# Patient Record
Sex: FEMALE | Race: WHITE | NOT HISPANIC OR LATINO | Employment: UNEMPLOYED
[De-identification: names, ages, dates, MRNs, and addresses within clinical notes are randomized per-mention and may not be internally consistent; named-entity substitution may affect disease eponyms.]

---

## 2022-10-03 ENCOUNTER — HEALTH MAINTENANCE LETTER (OUTPATIENT)
Age: 62
End: 2022-10-03

## 2022-10-27 ENCOUNTER — OFFICE VISIT (OUTPATIENT)
Dept: FAMILY MEDICINE | Facility: CLINIC | Age: 62
End: 2022-10-27
Payer: COMMERCIAL

## 2022-10-27 VITALS
HEIGHT: 61 IN | SYSTOLIC BLOOD PRESSURE: 150 MMHG | DIASTOLIC BLOOD PRESSURE: 74 MMHG | TEMPERATURE: 97.5 F | WEIGHT: 238.6 LBS | BODY MASS INDEX: 45.05 KG/M2 | OXYGEN SATURATION: 98 % | HEART RATE: 68 BPM | RESPIRATION RATE: 18 BRPM

## 2022-10-27 DIAGNOSIS — F33.0 MILD EPISODE OF RECURRENT MAJOR DEPRESSIVE DISORDER (H): ICD-10-CM

## 2022-10-27 DIAGNOSIS — K21.00 GASTROESOPHAGEAL REFLUX DISEASE WITH ESOPHAGITIS WITHOUT HEMORRHAGE: ICD-10-CM

## 2022-10-27 DIAGNOSIS — Z23 NEEDS FLU SHOT: ICD-10-CM

## 2022-10-27 DIAGNOSIS — N95.1 SYMPTOMATIC MENOPAUSAL OR FEMALE CLIMACTERIC STATES: ICD-10-CM

## 2022-10-27 DIAGNOSIS — E11.65 TYPE 2 DIABETES MELLITUS WITH HYPERGLYCEMIA, WITH LONG-TERM CURRENT USE OF INSULIN (H): ICD-10-CM

## 2022-10-27 DIAGNOSIS — M05.79 RHEUMATOID ARTHRITIS INVOLVING MULTIPLE SITES WITH POSITIVE RHEUMATOID FACTOR (H): ICD-10-CM

## 2022-10-27 DIAGNOSIS — Z12.11 SCREEN FOR COLON CANCER: ICD-10-CM

## 2022-10-27 DIAGNOSIS — Z79.4 TYPE 2 DIABETES MELLITUS WITH HYPERGLYCEMIA, WITH LONG-TERM CURRENT USE OF INSULIN (H): ICD-10-CM

## 2022-10-27 DIAGNOSIS — J45.40 MODERATE PERSISTENT ASTHMA WITHOUT COMPLICATION: ICD-10-CM

## 2022-10-27 DIAGNOSIS — E78.5 HYPERLIPIDEMIA LDL GOAL <100: ICD-10-CM

## 2022-10-27 DIAGNOSIS — E66.01 MORBID OBESITY (H): ICD-10-CM

## 2022-10-27 DIAGNOSIS — I50.9 CONGESTIVE HEART FAILURE, UNSPECIFIED HF CHRONICITY, UNSPECIFIED HEART FAILURE TYPE (H): ICD-10-CM

## 2022-10-27 DIAGNOSIS — Z76.89 ENCOUNTER TO ESTABLISH CARE: Primary | ICD-10-CM

## 2022-10-27 DIAGNOSIS — Z12.4 CERVICAL CANCER SCREENING: ICD-10-CM

## 2022-10-27 DIAGNOSIS — Z13.220 SCREENING FOR HYPERLIPIDEMIA: ICD-10-CM

## 2022-10-27 PROBLEM — Z85.828 HISTORY OF SQUAMOUS CELL CARCINOMA OF SKIN: Status: ACTIVE | Noted: 2022-10-27

## 2022-10-27 PROBLEM — F32.A DEPRESSION: Status: ACTIVE | Noted: 2022-01-13

## 2022-10-27 PROBLEM — G43.909 MIGRAINE HEADACHE: Status: ACTIVE | Noted: 2022-01-12

## 2022-10-27 PROBLEM — G62.9 NEUROPATHY, PERIPHERAL: Status: ACTIVE | Noted: 2020-02-04

## 2022-10-27 PROBLEM — Z86.16 HISTORY OF SEVERE ACUTE RESPIRATORY SYNDROME CORONAVIRUS 2 (SARS-COV-2) DISEASE: Status: ACTIVE | Noted: 2021-11-19

## 2022-10-27 PROBLEM — E11.69 DISORDER OF NERVOUS SYSTEM DUE TO TYPE 2 DIABETES MELLITUS (H): Status: ACTIVE | Noted: 2017-08-28

## 2022-10-27 PROBLEM — Z85.3 HISTORY OF MALIGNANT NEOPLASM OF BREAST: Status: ACTIVE | Noted: 2022-10-27

## 2022-10-27 PROBLEM — I25.10 ATHEROSCLEROTIC HEART DISEASE OF NATIVE CORONARY ARTERY WITHOUT ANGINA PECTORIS: Status: ACTIVE | Noted: 2022-10-27

## 2022-10-27 PROBLEM — G98.8 DISORDER OF NERVOUS SYSTEM DUE TO TYPE 2 DIABETES MELLITUS (H): Status: ACTIVE | Noted: 2017-08-28

## 2022-10-27 PROBLEM — K21.9 GASTROESOPHAGEAL REFLUX DISEASE: Status: ACTIVE | Noted: 2022-01-13

## 2022-10-27 LAB
ALT SERPL W P-5'-P-CCNC: 37 U/L (ref 0–50)
ANION GAP SERPL CALCULATED.3IONS-SCNC: 4 MMOL/L (ref 3–14)
BUN SERPL-MCNC: 16 MG/DL (ref 7–30)
CALCIUM SERPL-MCNC: 8.9 MG/DL (ref 8.5–10.1)
CHLORIDE BLD-SCNC: 104 MMOL/L (ref 94–109)
CHOLEST SERPL-MCNC: 155 MG/DL
CO2 SERPL-SCNC: 32 MMOL/L (ref 20–32)
CREAT SERPL-MCNC: 0.7 MG/DL (ref 0.52–1.04)
ERYTHROCYTE [DISTWIDTH] IN BLOOD BY AUTOMATED COUNT: 14.8 % (ref 10–15)
FASTING STATUS PATIENT QL REPORTED: YES
GFR SERPL CREATININE-BSD FRML MDRD: >90 ML/MIN/1.73M2
GLUCOSE BLD-MCNC: 290 MG/DL (ref 70–99)
HCT VFR BLD AUTO: 39 % (ref 35–47)
HDLC SERPL-MCNC: 78 MG/DL
HGB BLD-MCNC: 13.1 G/DL (ref 11.7–15.7)
LDLC SERPL CALC-MCNC: 55 MG/DL
MCH RBC QN AUTO: 29.3 PG (ref 26.5–33)
MCHC RBC AUTO-ENTMCNC: 33.6 G/DL (ref 31.5–36.5)
MCV RBC AUTO: 87 FL (ref 78–100)
NONHDLC SERPL-MCNC: 77 MG/DL
PLATELET # BLD AUTO: 334 10E3/UL (ref 150–450)
POTASSIUM BLD-SCNC: 3.8 MMOL/L (ref 3.4–5.3)
RBC # BLD AUTO: 4.47 10E6/UL (ref 3.8–5.2)
SODIUM SERPL-SCNC: 140 MMOL/L (ref 133–144)
TRIGL SERPL-MCNC: 110 MG/DL
TSH SERPL DL<=0.005 MIU/L-ACNC: 1.91 MU/L (ref 0.4–4)
WBC # BLD AUTO: 12 10E3/UL (ref 4–11)

## 2022-10-27 PROCEDURE — 36415 COLL VENOUS BLD VENIPUNCTURE: CPT | Performed by: NURSE PRACTITIONER

## 2022-10-27 PROCEDURE — 90682 RIV4 VACC RECOMBINANT DNA IM: CPT | Performed by: NURSE PRACTITIONER

## 2022-10-27 PROCEDURE — 83036 HEMOGLOBIN GLYCOSYLATED A1C: CPT | Performed by: NURSE PRACTITIONER

## 2022-10-27 PROCEDURE — 80048 BASIC METABOLIC PNL TOTAL CA: CPT | Performed by: NURSE PRACTITIONER

## 2022-10-27 PROCEDURE — 99205 OFFICE O/P NEW HI 60 MIN: CPT | Mod: 25 | Performed by: NURSE PRACTITIONER

## 2022-10-27 PROCEDURE — 85027 COMPLETE CBC AUTOMATED: CPT | Performed by: NURSE PRACTITIONER

## 2022-10-27 PROCEDURE — 80061 LIPID PANEL: CPT | Performed by: NURSE PRACTITIONER

## 2022-10-27 PROCEDURE — 90471 IMMUNIZATION ADMIN: CPT | Performed by: NURSE PRACTITIONER

## 2022-10-27 PROCEDURE — 84443 ASSAY THYROID STIM HORMONE: CPT | Performed by: NURSE PRACTITIONER

## 2022-10-27 PROCEDURE — 84460 ALANINE AMINO (ALT) (SGPT): CPT | Performed by: NURSE PRACTITIONER

## 2022-10-27 RX ORDER — BUDESONIDE AND FORMOTEROL FUMARATE DIHYDRATE 80; 4.5 UG/1; UG/1
2 AEROSOL RESPIRATORY (INHALATION)
COMMUNITY
Start: 2022-02-15 | End: 2023-06-15

## 2022-10-27 RX ORDER — BUPROPION HYDROCHLORIDE 150 MG/1
150 TABLET, EXTENDED RELEASE ORAL
COMMUNITY
Start: 2022-05-18 | End: 2023-05-23

## 2022-10-27 RX ORDER — CITALOPRAM HYDROBROMIDE 20 MG/1
20 TABLET ORAL
COMMUNITY
Start: 2022-06-09 | End: 2023-06-15

## 2022-10-27 RX ORDER — MONTELUKAST SODIUM 10 MG/1
10 TABLET ORAL AT BEDTIME
COMMUNITY
Start: 2022-08-18 | End: 2023-09-14

## 2022-10-27 RX ORDER — CLOBETASOL PROPIONATE 0.5 MG/G
OINTMENT TOPICAL 2 TIMES DAILY PRN
COMMUNITY
Start: 2022-02-16

## 2022-10-27 RX ORDER — ALBUTEROL SULFATE 90 UG/1
2 AEROSOL, METERED RESPIRATORY (INHALATION)
COMMUNITY
Start: 2022-01-17 | End: 2023-06-15

## 2022-10-27 RX ORDER — VALSARTAN 80 MG/1
1 TABLET ORAL AT BEDTIME
COMMUNITY
Start: 2021-11-12 | End: 2023-05-23

## 2022-10-27 RX ORDER — PANTOPRAZOLE SODIUM 40 MG/1
40 TABLET, DELAYED RELEASE ORAL DAILY
COMMUNITY
Start: 2022-06-17 | End: 2023-06-15

## 2022-10-27 RX ORDER — INSULIN GLARGINE 100 [IU]/ML
40 INJECTION, SOLUTION SUBCUTANEOUS
COMMUNITY
Start: 2022-04-28 | End: 2023-06-15

## 2022-10-27 RX ORDER — IPRATROPIUM BROMIDE AND ALBUTEROL SULFATE 2.5; .5 MG/3ML; MG/3ML
3 SOLUTION RESPIRATORY (INHALATION)
COMMUNITY
Start: 2022-02-15 | End: 2023-06-15

## 2022-10-27 RX ORDER — PREDNISONE 10 MG/1
TABLET ORAL
COMMUNITY
Start: 2022-02-16 | End: 2023-07-20

## 2022-10-27 RX ORDER — FOLIC ACID 1 MG/1
1 TABLET ORAL
COMMUNITY
Start: 2022-08-10 | End: 2023-11-07

## 2022-10-27 RX ORDER — PRAMIPEXOLE DIHYDROCHLORIDE 0.12 MG/1
TABLET ORAL
COMMUNITY
Start: 2022-10-17 | End: 2023-06-15

## 2022-10-27 RX ORDER — METOPROLOL SUCCINATE 50 MG/1
1 TABLET, EXTENDED RELEASE ORAL AT BEDTIME
COMMUNITY
Start: 2021-08-13 | End: 2023-06-15

## 2022-10-27 RX ORDER — METHOTREXATE SODIUM 25 MG/ML
20 INJECTION, SOLUTION INTRA-ARTERIAL; INTRAMUSCULAR; INTRAVENOUS
COMMUNITY
Start: 2022-09-20 | End: 2023-06-15

## 2022-10-27 RX ORDER — GABAPENTIN 100 MG/1
CAPSULE ORAL
COMMUNITY
Start: 2022-04-26 | End: 2023-06-15

## 2022-10-27 RX ORDER — ATORVASTATIN CALCIUM 40 MG/1
40 TABLET, FILM COATED ORAL DAILY
COMMUNITY
Start: 2022-01-21 | End: 2023-06-15

## 2022-10-27 RX ORDER — BLOOD SUGAR DIAGNOSTIC
STRIP MISCELLANEOUS
COMMUNITY
Start: 2022-02-04 | End: 2024-03-05

## 2022-10-27 RX ORDER — FUROSEMIDE 20 MG
20 TABLET ORAL
COMMUNITY
Start: 2022-09-19 | End: 2023-06-15

## 2022-10-27 RX ORDER — DICYCLOMINE HYDROCHLORIDE 10 MG/1
CAPSULE ORAL
COMMUNITY
Start: 2021-05-11

## 2022-10-27 RX ORDER — IBUPROFEN 200 MG
200 CAPSULE ORAL
COMMUNITY
Start: 2022-09-20 | End: 2023-07-20

## 2022-10-27 RX ORDER — SODIUM CHLORIDE FOR INHALATION 3 %
4 VIAL, NEBULIZER (ML) INHALATION
COMMUNITY
Start: 2022-02-15

## 2022-10-27 ASSESSMENT — PAIN SCALES - GENERAL: PAINLEVEL: SEVERE PAIN (6)

## 2022-10-27 NOTE — LETTER
My Heart Failure Action Plan   Name: Rocio RODRIGUEZ Mensing    YOB: 1960   Date: 10/27/2022    My doctor: Naty Redmond [address] [address] [phone]  My Diagnosis: Combined Heart Failure   My Ejection Fraction: No results found for: LVEF   %   My Exercise Goal: 30 minutes daily     My Weight Plan:   Wt Readings from Last 2 Encounters:   10/27/22 108.2 kg (238 lb 9.6 oz)     Weigh yourself daily using the same scale. If you gain more than 2 pounds in 24 hours or 5 pounds in a week call the clinic    My Diet Goal: No added salt    Emergency Room Visits:    Our goal is to improve your quality of life and help you avoid a visit to the emergency room or hospital.  If we work together, we can achieve this goal. But, if you feel you need to call 911 or go to the emergency room, please do so.  If you go to the emergency room, please bring your list of medicines and your daily weight chart with you.       GREEN ZONE     Doing well today    Weight gained is no more than 2 pounds a day or 5 pounds a week.    No swelling in feet, ankles, legs or stomach.    No more swelling than usual.    No more trouble breathing than usual.    No change in my sleep.    No other problems. Actions:    I am doing fine.  I will take my medicine, follow my diet, see my doctor, exercise, and watch for symptoms.           YELLOW ZONE         Having a bad day or flare up    Weight gain of more than 2 pounds in one day or 5 pounds in one week.    New swelling in ankle, leg, knee or thigh.    Bloating in belly, pants feel tighter.    Swelling in hands or face.    Coughing or trouble breathing while walking or talking.    Harder to breathe last night.    Have trouble sleeping, wake up short of breath.    Much more tired than usual.    Not eating.    Pain in my chest or bad leg cramps.    Feel weak or dizzy. Actions:    I need to take action and call my doctor or  nurse today.                 RED ZONE         Need medical care now    Weight gain of 5 pounds overnight.    Chest pain or pressure that does not go away.    Feel less alert.    Wheezing or have trouble breathing when at rest.    Cannot sleep lying down.    Cannot take my water pill.    Pass out or faint. Actions:    I need to call my doctor or nurse now!    Call 911 if I have chest pain or cannot breathe.

## 2022-10-27 NOTE — PROGRESS NOTES
Assessment & Plan     Encounter to establish care  Chart review  Recent move to area to care for ailing father.  Would like to establish with our specialists.     Rheumatoid arthritis involving multiple sites with positive rheumatoid factor (H)  Followed by Rheumatology.  Wanted dexa= ordered.  On methotrexate, prednisone  - REVIEW OF HEALTH MAINTENANCE PROTOCOL ORDERS  - Adult Rheumatology  Referral; Future    Type 2 diabetes mellitus with hyperglycemia, with long-term current use of insulin (H)  Check labs.  On metformin, statin, asa, lantus, novalog, neurotin  - REVIEW OF HEALTH MAINTENANCE PROTOCOL ORDERS  - AMB Adult Diabetes Educator Referral; Future  - Lipid panel reflex to direct LDL Fasting; Future  - ALT; Future  - Basic metabolic panel  (Ca, Cl, CO2, Creat, Gluc, K, Na, BUN); Future  - CBC with platelets; Future  - TSH with free T4 reflex; Future  - Lipid panel reflex to direct LDL Fasting  - ALT  - Basic metabolic panel  (Ca, Cl, CO2, Creat, Gluc, K, Na, BUN)  - CBC with platelets  - TSH with free T4 reflex    Congestive heart failure, unspecified HF chronicity, unspecified heart failure type (H)  Stable.  Followed by Cardiology.  Last ERENDIRA 7/2022.  Referral sent.  On metoprolol, lasix, diovan  - Adult Cardiology Eval  Referral; Future    Morbid obesity (H)  Recommend dietary and lifestyle changes    Major depression:   On wellbutrin, celexa.      Moderate persistent asthma without complication  On singulair, symbicort    Symptomatic menopausal or female climacteric states  Dexa.    - DX Hip/Pelvis/Spine; Future    Restless leg on mirapex    GERD:   On PPI    Screen for colon cancer  Up to date    Cervical cancer screening  Had hysterectomy      Needs flu shot  update  - INFLUENZA QUAD, RECOMBINANT, P-FREE (RIV4) (FLUBLOK) AGE 50-64 [[id number]]    65 minutes spent on the date of the encounter doing chart review, review of outside records, review of test results, interpretation of tests,  "patient visit and documentation        BMI:   Estimated body mass index is 44.94 kg/m  as calculated from the following:    Height as of this encounter: 1.552 m (5' 1.1\").    Weight as of this encounter: 108.2 kg (238 lb 9.6 oz).   Weight management plan: Discussed healthy diet and exercise guidelines      Return in about 6 months (around 4/27/2023) for Physical Exam.    Naty Redmond NP  [facility] MATT Chan is a 62 year old, presenting for the following health issues:  Establish Care      History of Present Illness       Reason for visit:  New patient    She eats 0-1 servings of fruits and vegetables daily.She consumes 0 sweetened beverage(s) daily.She exercises with enough effort to increase her heart rate 9 or less minutes per day.  She exercises with enough effort to increase her heart rate 3 or less days per week.   She is taking medications regularly.       Arthritis- rheumatoid- pain and swelling  Diabetes Follow-up    How often are you checking your blood sugar? Continuous glucose monitor  What time of day are you checking your blood sugars (select all that apply)?  Not applicable  Have you had any blood sugars above 200?  No  Have you had any blood sugars below 70?  No    What symptoms do you notice when your blood sugar is low?  None    What concerns do you have today about your diabetes? None     Do you have any of these symptoms? (Select all that apply)  Numbness in feet              Hyperlipidemia Follow-Up      Are you regularly taking any medication or supplement to lower your cholesterol?   Yes- ,    Are you having muscle aches or other side effects that you think could be caused by your cholesterol lowering medication?  No    Hypertension Follow-up      Do you check your blood pressure regularly outside of the clinic? No     Are you following a low salt diet? Yes    Are your blood pressures ever more than 140 on the top number (systolic) OR more   than 90 " "on the bottom number (diastolic), for example 140/90? No    BP Readings from Last 2 Encounters:   10/27/22 (!) 150/74     LDL Cholesterol Calculated (mg/dL)   Date Value   10/27/2022 55       Depression Followup    How are you doing with your depression since your last visit? No change    Are you having other symptoms that might be associated with depression? No    Have you had a significant life event?  OTHER: caring for father     Are you feeling anxious or having panic attacks?   No    Do you have any concerns with your use of alcohol or other drugs? No    Social History     Tobacco Use     Smoking status: Never     Smokeless tobacco: Never   Substance Use Topics     Alcohol use: Not Currently     Drug use: Never     No flowsheet data found.  No flowsheet data found.  No flowsheet data found.    Suicide Assessment Five-step Evaluation and Treatment (SAFE-T)      Review of Systems   Constitutional, HEENT, cardiovascular, pulmonary, GI, , musculoskeletal, neuro, skin, endocrine and psych systems are negative, except as otherwise noted.      Objective    BP (!) 150/74   Pulse 68   Temp 97.5  F (36.4  C) (Tympanic)   Resp 18   Ht 1.552 m (5' 1.1\")   Wt 108.2 kg (238 lb 9.6 oz)   SpO2 98%   BMI 44.94 kg/m    Body mass index is 44.94 kg/m .  Physical Exam   GENERAL: healthy, alert and no distress  EYES: Eyes grossly normal to inspection, PERRL and conjunctivae and sclerae normal  HENT: ear canals and TM's normal, nose and mouth without ulcers or lesions  NECK: no adenopathy, no asymmetry, masses, or scars and thyroid normal to palpation  RESP: lungs clear to auscultation - no rales, rhonchi or wheezes  CV: regular rate and rhythm, normal S1 S2, no S3 or S4, no murmur, click or rub, no peripheral edema and peripheral pulses strong  ABDOMEN: soft, nontender, no hepatosplenomegaly, no masses and bowel sounds normal  MS: no gross musculoskeletal defects noted, no edema    Results for orders placed or performed in " visit on 10/27/22 (from the past 24 hour(s))   Lipid panel reflex to direct LDL Fasting   Result Value Ref Range    Cholesterol 155 <200 mg/dL    Triglycerides 110 <150 mg/dL    Direct Measure HDL 78 >=50 mg/dL    LDL Cholesterol Calculated 55 <=100 mg/dL    Non HDL Cholesterol 77 <130 mg/dL    Patient Fasting > 8hrs? Yes     Narrative    Cholesterol  Desirable:  <200 mg/dL    Triglycerides  Normal:  Less than 150 mg/dL  Borderline High:  150-199 mg/dL  High:  200-499 mg/dL  Very High:  Greater than or equal to 500 mg/dL    Direct Measure HDL  Female:  Greater than or equal to 50 mg/dL   Male:  Greater than or equal to 40 mg/dL    LDL Cholesterol  Desirable:  <100mg/dL  Above Desirable:  100-129 mg/dL   Borderline High:  130-159 mg/dL   High:  160-189 mg/dL   Very High:  >= 190 mg/dL    Non HDL Cholesterol  Desirable:  130 mg/dL  Above Desirable:  130-159 mg/dL  Borderline High:  160-189 mg/dL  High:  190-219 mg/dL  Very High:  Greater than or equal to 220 mg/dL   ALT   Result Value Ref Range    ALT 37 0 - 50 U/L   Basic metabolic panel  (Ca, Cl, CO2, Creat, Gluc, K, Na, BUN)   Result Value Ref Range    Sodium 140 133 - 144 mmol/L    Potassium 3.8 3.4 - 5.3 mmol/L    Chloride 104 94 - 109 mmol/L    Carbon Dioxide (CO2) 32 20 - 32 mmol/L    Anion Gap 4 3 - 14 mmol/L    Urea Nitrogen 16 7 - 30 mg/dL    Creatinine 0.70 0.52 - 1.04 mg/dL    Calcium 8.9 8.5 - 10.1 mg/dL    Glucose 290 (H) 70 - 99 mg/dL    GFR Estimate >90 >60 mL/min/1.73m2   CBC with platelets   Result Value Ref Range    WBC Count 12.0 (H) 4.0 - 11.0 10e3/uL    RBC Count 4.47 3.80 - 5.20 10e6/uL    Hemoglobin 13.1 11.7 - 15.7 g/dL    Hematocrit 39.0 35.0 - 47.0 %    MCV 87 78 - 100 fL    MCH 29.3 26.5 - 33.0 pg    MCHC 33.6 31.5 - 36.5 g/dL    RDW 14.8 10.0 - 15.0 %    Platelet Count 334 150 - 450 10e3/uL   TSH with free T4 reflex   Result Value Ref Range    TSH 1.91 0.40 - 4.00 mU/L

## 2022-10-28 LAB — HBA1C MFR BLD: 11.2 % (ref 0–5.6)

## 2022-11-01 ENCOUNTER — TELEPHONE (OUTPATIENT)
Dept: FAMILY MEDICINE | Facility: CLINIC | Age: 62
End: 2022-11-01

## 2022-11-01 NOTE — TELEPHONE ENCOUNTER
Diabetes Education Scheduling Outreach #1:    Call to patient to schedule. Voicemail full.    Also sent [employer] message for second attempt. Requested patient to call to schedule.    Shahrzad Grissom OnCall  Diabetes and Nutrition Scheduling

## 2022-11-18 ENCOUNTER — HOSPITAL ENCOUNTER (OUTPATIENT)
Dept: BONE DENSITY | Facility: CLINIC | Age: 62
Discharge: HOME | End: 2022-11-18
Attending: NURSE PRACTITIONER | Admitting: NURSE PRACTITIONER
Payer: COMMERCIAL

## 2022-11-18 DIAGNOSIS — N95.1 SYMPTOMATIC MENOPAUSAL OR FEMALE CLIMACTERIC STATES: ICD-10-CM

## 2022-11-18 PROCEDURE — 77080 DXA BONE DENSITY AXIAL: CPT

## 2023-01-12 DIAGNOSIS — Z79.4 TYPE 2 DIABETES MELLITUS WITH HYPERGLYCEMIA, WITH LONG-TERM CURRENT USE OF INSULIN (H): Primary | ICD-10-CM

## 2023-01-12 DIAGNOSIS — E11.65 TYPE 2 DIABETES MELLITUS WITH HYPERGLYCEMIA, WITH LONG-TERM CURRENT USE OF INSULIN (H): Primary | ICD-10-CM

## 2023-01-12 NOTE — TELEPHONE ENCOUNTER
Pending Prescriptions:                       Disp   Refills    metFORMIN (GLUCOPHAGE) 1000 MG tablet      90 tab*3        Sig: Take 1 tablet (1,000 mg) by mouth daily (with dinner)      Routing refill request to provider for review/approval because:  Medication is reported/historical    Marielle Perez RN on 1/12/2023 at 3:08 PM

## 2023-02-12 ENCOUNTER — HEALTH MAINTENANCE LETTER (OUTPATIENT)
Age: 63
End: 2023-02-12

## 2023-05-21 ENCOUNTER — HEALTH MAINTENANCE LETTER (OUTPATIENT)
Age: 63
End: 2023-05-21

## 2023-05-23 ENCOUNTER — MYC REFILL (OUTPATIENT)
Dept: FAMILY MEDICINE | Facility: CLINIC | Age: 63
End: 2023-05-23
Payer: COMMERCIAL

## 2023-05-23 DIAGNOSIS — I50.9 CONGESTIVE HEART FAILURE, UNSPECIFIED HF CHRONICITY, UNSPECIFIED HEART FAILURE TYPE (H): ICD-10-CM

## 2023-05-23 DIAGNOSIS — F33.0 MILD EPISODE OF RECURRENT MAJOR DEPRESSIVE DISORDER (H): Primary | ICD-10-CM

## 2023-05-25 RX ORDER — IBUPROFEN 200 MG
200 CAPSULE ORAL
Status: CANCELLED | OUTPATIENT
Start: 2023-05-25

## 2023-05-25 RX ORDER — VALSARTAN 80 MG/1
80 TABLET ORAL AT BEDTIME
Qty: 90 TABLET | Refills: 0 | Status: SHIPPED | OUTPATIENT
Start: 2023-05-25 | End: 2023-06-15

## 2023-05-25 RX ORDER — BUPROPION HYDROCHLORIDE 150 MG/1
150 TABLET, EXTENDED RELEASE ORAL DAILY
Qty: 90 TABLET | Refills: 3 | Status: SHIPPED | OUTPATIENT
Start: 2023-05-25 | End: 2023-06-15

## 2023-05-25 RX ORDER — DICYCLOMINE HYDROCHLORIDE 10 MG/1
CAPSULE ORAL
Status: CANCELLED | OUTPATIENT
Start: 2023-05-25

## 2023-05-25 RX ORDER — PREDNISONE 10 MG/1
TABLET ORAL
Status: CANCELLED | OUTPATIENT
Start: 2023-05-25

## 2023-05-25 NOTE — TELEPHONE ENCOUNTER
Pending Prescriptions:                       Disp   Refills    calcium citrate (CITRACAL) 950 (200 Ca) MG*                Sig: Take 1 tablet (950 mg) by mouth    buPROPion (WELLBUTRIN SR) 150 MG 12 hr tab*                Si tablet (150 mg)    dicyclomine (BENTYL) 10 MG capsule                           predniSONE (DELTASONE) 10 MG tablet                          valsartan (DIOVAN) 80 MG tablet                            Sig: Take 1 tablet (80 mg) by mouth At Bedtime      Routing refill request to provider for review/approval because:  Medication is reported/historical    Marielle Perez RN on 2023 at 10:15 AM

## 2023-06-01 ENCOUNTER — MYC REFILL (OUTPATIENT)
Dept: FAMILY MEDICINE | Facility: CLINIC | Age: 63
End: 2023-06-01

## 2023-06-01 RX ORDER — PREDNISONE 10 MG/1
TABLET ORAL
Status: CANCELLED | OUTPATIENT
Start: 2023-06-01

## 2023-06-02 NOTE — TELEPHONE ENCOUNTER
Pending Prescriptions:                       Disp   Refills    predniSONE (DELTASONE) 10 MG tablet                            Routing refill request to provider for review/approval because:  Drug not on the [facility] refill protocol     Marielle Perez RN on 6/2/2023 at 12:29 PM

## 2023-06-15 ENCOUNTER — MYC MEDICAL ADVICE (OUTPATIENT)
Dept: FAMILY MEDICINE | Facility: CLINIC | Age: 63
End: 2023-06-15

## 2023-06-15 ENCOUNTER — OFFICE VISIT (OUTPATIENT)
Dept: FAMILY MEDICINE | Facility: CLINIC | Age: 63
End: 2023-06-15
Payer: COMMERCIAL

## 2023-06-15 ENCOUNTER — PATIENT OUTREACH (OUTPATIENT)
Dept: FAMILY MEDICINE | Facility: CLINIC | Age: 63
End: 2023-06-15

## 2023-06-15 VITALS
TEMPERATURE: 98.1 F | SYSTOLIC BLOOD PRESSURE: 162 MMHG | DIASTOLIC BLOOD PRESSURE: 82 MMHG | BODY MASS INDEX: 46.61 KG/M2 | HEIGHT: 61 IN | OXYGEN SATURATION: 97 % | WEIGHT: 246.9 LBS | HEART RATE: 74 BPM

## 2023-06-15 DIAGNOSIS — I50.9 CONGESTIVE HEART FAILURE, UNSPECIFIED HF CHRONICITY, UNSPECIFIED HEART FAILURE TYPE (H): ICD-10-CM

## 2023-06-15 DIAGNOSIS — E11.65 TYPE 2 DIABETES MELLITUS WITH HYPERGLYCEMIA, WITH LONG-TERM CURRENT USE OF INSULIN (H): ICD-10-CM

## 2023-06-15 DIAGNOSIS — E66.01 MORBID OBESITY (H): ICD-10-CM

## 2023-06-15 DIAGNOSIS — G25.81 RESTLESS LEGS SYNDROME (RLS): ICD-10-CM

## 2023-06-15 DIAGNOSIS — E78.5 HYPERLIPIDEMIA LDL GOAL <100: ICD-10-CM

## 2023-06-15 DIAGNOSIS — F33.0 MILD EPISODE OF RECURRENT MAJOR DEPRESSIVE DISORDER (H): ICD-10-CM

## 2023-06-15 DIAGNOSIS — Z79.4 TYPE 2 DIABETES MELLITUS WITH HYPERGLYCEMIA, WITH LONG-TERM CURRENT USE OF INSULIN (H): ICD-10-CM

## 2023-06-15 DIAGNOSIS — Z76.89 ENCOUNTER TO ESTABLISH CARE: ICD-10-CM

## 2023-06-15 DIAGNOSIS — L84 CORN OR CALLUS: ICD-10-CM

## 2023-06-15 DIAGNOSIS — J45.40 MODERATE PERSISTENT ASTHMA WITHOUT COMPLICATION: Primary | ICD-10-CM

## 2023-06-15 DIAGNOSIS — M05.79 RHEUMATOID ARTHRITIS INVOLVING MULTIPLE SITES WITH POSITIVE RHEUMATOID FACTOR (H): ICD-10-CM

## 2023-06-15 DIAGNOSIS — K21.00 GASTROESOPHAGEAL REFLUX DISEASE WITH ESOPHAGITIS WITHOUT HEMORRHAGE: ICD-10-CM

## 2023-06-15 DIAGNOSIS — G62.9 PERIPHERAL POLYNEUROPATHY: ICD-10-CM

## 2023-06-15 LAB
ANION GAP SERPL CALCULATED.3IONS-SCNC: 11 MMOL/L (ref 7–15)
BUN SERPL-MCNC: 14.1 MG/DL (ref 8–23)
CALCIUM SERPL-MCNC: 9.7 MG/DL (ref 8.8–10.2)
CHLORIDE SERPL-SCNC: 99 MMOL/L (ref 98–107)
CREAT SERPL-MCNC: 0.74 MG/DL (ref 0.51–0.95)
CREAT UR-MCNC: 15.5 MG/DL
DEPRECATED HCO3 PLAS-SCNC: 27 MMOL/L (ref 22–29)
GFR SERPL CREATININE-BSD FRML MDRD: 90 ML/MIN/1.73M2
GLUCOSE SERPL-MCNC: 301 MG/DL (ref 70–99)
HBA1C MFR BLD: 9.9 % (ref 0–5.6)
MICROALBUMIN UR-MCNC: <12 MG/L
MICROALBUMIN/CREAT UR: NORMAL MG/G{CREAT}
POTASSIUM SERPL-SCNC: 4.3 MMOL/L (ref 3.4–5.3)
SODIUM SERPL-SCNC: 137 MMOL/L (ref 136–145)

## 2023-06-15 PROCEDURE — 83036 HEMOGLOBIN GLYCOSYLATED A1C: CPT | Performed by: PHYSICIAN ASSISTANT

## 2023-06-15 PROCEDURE — 99215 OFFICE O/P EST HI 40 MIN: CPT | Mod: 25 | Performed by: PHYSICIAN ASSISTANT

## 2023-06-15 PROCEDURE — 90471 IMMUNIZATION ADMIN: CPT | Performed by: PHYSICIAN ASSISTANT

## 2023-06-15 PROCEDURE — 82570 ASSAY OF URINE CREATININE: CPT | Performed by: PHYSICIAN ASSISTANT

## 2023-06-15 PROCEDURE — 90677 PCV20 VACCINE IM: CPT | Performed by: PHYSICIAN ASSISTANT

## 2023-06-15 PROCEDURE — 80048 BASIC METABOLIC PNL TOTAL CA: CPT | Performed by: PHYSICIAN ASSISTANT

## 2023-06-15 PROCEDURE — 36415 COLL VENOUS BLD VENIPUNCTURE: CPT | Performed by: PHYSICIAN ASSISTANT

## 2023-06-15 PROCEDURE — 82043 UR ALBUMIN QUANTITATIVE: CPT | Performed by: PHYSICIAN ASSISTANT

## 2023-06-15 RX ORDER — ALBUTEROL SULFATE 90 UG/1
2 AEROSOL, METERED RESPIRATORY (INHALATION) EVERY 6 HOURS PRN
Qty: 18 G | Refills: 0 | Status: SHIPPED | OUTPATIENT
Start: 2023-06-15 | End: 2023-10-12

## 2023-06-15 RX ORDER — METHOTREXATE SODIUM 25 MG/ML
20 INJECTION, SOLUTION INTRA-ARTERIAL; INTRAMUSCULAR; INTRAVENOUS WEEKLY
Qty: 10 ML | Refills: 3 | Status: SHIPPED | OUTPATIENT
Start: 2023-06-15 | End: 2023-07-20

## 2023-06-15 RX ORDER — GABAPENTIN 300 MG/1
300 CAPSULE ORAL 3 TIMES DAILY
Qty: 270 CAPSULE | Refills: 1 | Status: SHIPPED | OUTPATIENT
Start: 2023-06-15 | End: 2023-12-15

## 2023-06-15 RX ORDER — ATORVASTATIN CALCIUM 40 MG/1
40 TABLET, FILM COATED ORAL DAILY
Qty: 90 TABLET | Refills: 1 | Status: SHIPPED | OUTPATIENT
Start: 2023-06-15 | End: 2023-12-29

## 2023-06-15 RX ORDER — INSULIN GLARGINE 100 [IU]/ML
40 INJECTION, SOLUTION SUBCUTANEOUS AT BEDTIME
Qty: 15 ML | Refills: 3 | Status: SHIPPED | OUTPATIENT
Start: 2023-06-15 | End: 2023-11-30

## 2023-06-15 RX ORDER — METOPROLOL SUCCINATE 50 MG/1
50 TABLET, EXTENDED RELEASE ORAL AT BEDTIME
Qty: 90 TABLET | Refills: 1 | Status: SHIPPED | OUTPATIENT
Start: 2023-06-15 | End: 2023-12-06

## 2023-06-15 RX ORDER — CITALOPRAM HYDROBROMIDE 20 MG/1
20 TABLET ORAL DAILY
Qty: 90 TABLET | Refills: 1 | Status: SHIPPED | OUTPATIENT
Start: 2023-06-15 | End: 2023-12-19

## 2023-06-15 RX ORDER — BUPROPION HYDROCHLORIDE 150 MG/1
150 TABLET, EXTENDED RELEASE ORAL DAILY
Qty: 90 TABLET | Refills: 3 | Status: SHIPPED | OUTPATIENT
Start: 2023-06-15 | End: 2024-05-23

## 2023-06-15 RX ORDER — DAPAGLIFLOZIN 5 MG/1
5 TABLET, FILM COATED ORAL DAILY
Qty: 90 TABLET | Refills: 1 | Status: SHIPPED | OUTPATIENT
Start: 2023-06-15 | End: 2023-07-20 | Stop reason: DRUGHIGH

## 2023-06-15 RX ORDER — PANTOPRAZOLE SODIUM 40 MG/1
40 TABLET, DELAYED RELEASE ORAL DAILY
Qty: 90 TABLET | Refills: 1 | Status: SHIPPED | OUTPATIENT
Start: 2023-06-15 | End: 2024-03-06

## 2023-06-15 RX ORDER — FUROSEMIDE 20 MG
20 TABLET ORAL 2 TIMES DAILY
Qty: 180 TABLET | Refills: 1 | Status: SHIPPED | OUTPATIENT
Start: 2023-06-15 | End: 2023-12-06

## 2023-06-15 RX ORDER — BUDESONIDE AND FORMOTEROL FUMARATE DIHYDRATE 80; 4.5 UG/1; UG/1
2 AEROSOL RESPIRATORY (INHALATION) 2 TIMES DAILY
Qty: 30.6 G | Refills: 1 | Status: SHIPPED | OUTPATIENT
Start: 2023-06-15 | End: 2023-07-20

## 2023-06-15 RX ORDER — PRAMIPEXOLE DIHYDROCHLORIDE 0.12 MG/1
TABLET ORAL
Qty: 360 TABLET | Refills: 1 | Status: SHIPPED | OUTPATIENT
Start: 2023-06-15 | End: 2023-11-30

## 2023-06-15 RX ORDER — VALSARTAN 80 MG/1
80 TABLET ORAL AT BEDTIME
Qty: 90 TABLET | Refills: 0 | Status: SHIPPED | OUTPATIENT
Start: 2023-06-15 | End: 2023-07-20 | Stop reason: DRUGHIGH

## 2023-06-15 RX ORDER — PREDNISONE 10 MG/1
TABLET ORAL
Status: CANCELLED | OUTPATIENT
Start: 2023-06-15

## 2023-06-15 RX ORDER — IPRATROPIUM BROMIDE AND ALBUTEROL SULFATE 2.5; .5 MG/3ML; MG/3ML
3 SOLUTION RESPIRATORY (INHALATION) EVERY 6 HOURS PRN
Qty: 90 ML | Refills: 3 | Status: SHIPPED | OUTPATIENT
Start: 2023-06-15

## 2023-06-15 ASSESSMENT — ASTHMA QUESTIONNAIRES
QUESTION_3 LAST FOUR WEEKS HOW OFTEN DID YOUR ASTHMA SYMPTOMS (WHEEZING, COUGHING, SHORTNESS OF BREATH, CHEST TIGHTNESS OR PAIN) WAKE YOU UP AT NIGHT OR EARLIER THAN USUAL IN THE MORNING: NOT AT ALL
QUESTION_2 LAST FOUR WEEKS HOW OFTEN HAVE YOU HAD SHORTNESS OF BREATH: MORE THAN ONCE A DAY
ACT_TOTALSCORE: 18
ACT_TOTALSCORE: 18
QUESTION_4 LAST FOUR WEEKS HOW OFTEN HAVE YOU USED YOUR RESCUE INHALER OR NEBULIZER MEDICATION (SUCH AS ALBUTEROL): ONCE A WEEK OR LESS
QUESTION_5 LAST FOUR WEEKS HOW WOULD YOU RATE YOUR ASTHMA CONTROL: WELL CONTROLLED
QUESTION_1 LAST FOUR WEEKS HOW MUCH OF THE TIME DID YOUR ASTHMA KEEP YOU FROM GETTING AS MUCH DONE AT WORK, SCHOOL OR AT HOME: A LITTLE OF THE TIME

## 2023-06-15 ASSESSMENT — PATIENT HEALTH QUESTIONNAIRE - PHQ9
SUM OF ALL RESPONSES TO PHQ QUESTIONS 1-9: 9
10. IF YOU CHECKED OFF ANY PROBLEMS, HOW DIFFICULT HAVE THESE PROBLEMS MADE IT FOR YOU TO DO YOUR WORK, TAKE CARE OF THINGS AT HOME, OR GET ALONG WITH OTHER PEOPLE: SOMEWHAT DIFFICULT
SUM OF ALL RESPONSES TO PHQ QUESTIONS 1-9: 9

## 2023-06-15 NOTE — LETTER
Jace Chan,     Thank you for choosing [facility] for your health care needs.     [facility] is transforming primary care  At [facility], we re dedicated to constantly improve how we serve the health care needs of our patients and communities. We re currently making changes to the way we deliver care.     Changes you ll notice include:  An emphasis on building a relationship with a primary care provider  Access to a PAL (patient advocate and liaison) to help guide you with your care needs  Appointment lengths tailored to your specific needs and greater access to a care team to help you and your provider improve and maintain your health and well-being  Improved online access to your care team    Benefits of a primary care provider  If you don t have a designated primary care provider, we encourage you to get to know our care team online and find a provider you d like to see. Most of our providers have a short video on their online provider page. Visit [email or web address] to explore our providers and locations.    Benefits of having a primary care provider include:    They get to know you - your health history, family history and goals, making it easier to make a health plan together.   You get to know them - making health-related conversations and decisions easier    Primary care doctors help you when you re sick or hurt - but also focus on keeping you healthy with preventive care and screenings.    A doctor who sees you regularly is more likely to notice changes in your health.   You ll be connected to a broad care team who partners with your provider to support you.    Patient Advocate Liaison (PAL)   To help make sure you get the right care, at the right time, we include PALs, or Patient Advocate Liaisons, as part of your care team. Your PAL will be your first line of contact. They ll advocate for your needs and help you navigate our services, connecting you with care team members and community  resources to ensure your care is well coordinated. You may be introduced to a PAL in an upcoming visit.     Expanded care team access with tailored appointment lengths  Depending on your health care needs, you may have longer or shorter appointments and see additional care team providers - including Medication Therapy Management (MTM) pharmacists, diabetes educators, behavioral health clinicians, or social workers. At times, they may be included in your visit with your provider, or you may see them individually.     Online access to your health care records and care team  [employer] is our online tool that makes it easy to see your health care information and communicate with your care team.     [employer] allows you to:   View your health maintenance plan so you know when you re due for a preventive screening  Send secure messages to your care team  View your health history and visit summaries   Schedule appointments   Complete questionnaires and eCheck-in before appointments    Get care from your provider with an e-visit    View and pay your bill     Sign up at [employer]. Once you have an account, you also can download the mobile baylee.     Connecting to fast and convenient care  When you need fast, convenient care - consider one of the following options:     Video Visit: A convenient care option for visiting with your provider out of the comfort of your own home. Most of the things you come to the clinic to address with your provider can now be done virtually through a video. This includes your chronic medication follow up, questions or concerns you may have, and even your annual Medicare Wellness Visit.     Phone Visit: Another convenient option for follow up of common problems that may require a more in-depth discussion with your provider.     E-visit: When you need acute care quickly, or have a quick question about your medication, an E-visit is completed through [employer] and your provider will respond  within one business day.        Take care,     Filomena BOJORQUEZ RN   PAL (Patient Advocate Liaison)  [facility]  [phone]

## 2023-06-15 NOTE — NURSING NOTE
PAL RN met with pt, explained PAL RN role.    -Gave pt PAL RN contact information.   -Assisted pt in scheduling MTM visit in 1 month.   -PAL RN to call pt to schedule follow-up co-visit with Behzad Correa PA-C and MTM in 3 months.     MOR Elizabeth (Patient Advocate Liaison)  [facility]  [phone]
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
## 2023-06-15 NOTE — TELEPHONE ENCOUNTER
MICHAEL RN scheduled a follow-up visit with PCP and MTM for pt on 9/18/23 at 10:40 AM.     Call placed to pt to notify her of scheduled appointment-Unable to leave message as vm has not been set up yet.     SB3/5 PAL welcome letter sent     -Sent pt a [employer] message to notify her of scheduled appointment time.     -Will follow-up to ensure appointment time and date work for pt.     MOR Elizabeth (Patient Advocate Liaison)  [facility]  [phone]

## 2023-06-16 ENCOUNTER — PATIENT OUTREACH (OUTPATIENT)
Dept: FAMILY MEDICINE | Facility: CLINIC | Age: 63
End: 2023-06-16

## 2023-06-16 NOTE — TELEPHONE ENCOUNTER
PAL attempted to call pt, however no  is setup to relay message below from Behzad Correa PA-C.     -Sent pt a [employer] message to request she call insurance for alternative options.  Will await response.     MOR Elizabeth (Patient Advocate Liaison)  [facility]  [phone]

## 2023-06-16 NOTE — TELEPHONE ENCOUNTER
.Behzad Correa PA-C-    Farxiga is not affordable for pt. Cost is >$1600/month per pt. Is an alternative available?    Please review and advise.       Received a call from pt,     -Pt attempted to refill farxiga and the cost is >$1600/month.     -She attempted to use [facility] with no improvement in cost.     -She will look into a possible manufacturers coupon as advised by writer.     -She would like a more affordable option if possible     -Pt notified of co-visit appointment scheduled on 9/18 and is agreeable with appointment time and date.   Routed to PCP    Filomena BOJORQUEZ RN   PAL (Patient Advocate Liaison)  [facility]  [phone]

## 2023-06-16 NOTE — TELEPHONE ENCOUNTER
There are multiple alternatives we could try, but I do not know if they would be covered or not. I recommend she check with her insurance prior to sending to avoid delay and multiple encounters.     Jardiance would be the preferred alternative.     However, ozempic, trulicity, bydureon, are all possible as well.     -[facility]

## 2023-06-26 ENCOUNTER — TRANSFERRED RECORDS (OUTPATIENT)
Dept: MULTI SPECIALTY CLINIC | Facility: CLINIC | Age: 63
End: 2023-06-26

## 2023-06-26 LAB — RETINOPATHY: NORMAL

## 2023-07-03 ENCOUNTER — OFFICE VISIT (OUTPATIENT)
Dept: PODIATRY | Facility: CLINIC | Age: 63
End: 2023-07-03
Attending: PHYSICIAN ASSISTANT
Payer: COMMERCIAL

## 2023-07-03 VITALS
SYSTOLIC BLOOD PRESSURE: 150 MMHG | WEIGHT: 241.1 LBS | OXYGEN SATURATION: 98 % | BODY MASS INDEX: 45.56 KG/M2 | HEART RATE: 63 BPM | DIASTOLIC BLOOD PRESSURE: 64 MMHG | RESPIRATION RATE: 16 BRPM

## 2023-07-03 DIAGNOSIS — L84 CORN OR CALLUS: ICD-10-CM

## 2023-07-03 DIAGNOSIS — Z79.4 TYPE 2 DIABETES MELLITUS WITH HYPERGLYCEMIA, WITH LONG-TERM CURRENT USE OF INSULIN (H): Primary | ICD-10-CM

## 2023-07-03 DIAGNOSIS — E11.65 TYPE 2 DIABETES MELLITUS WITH HYPERGLYCEMIA, WITH LONG-TERM CURRENT USE OF INSULIN (H): Primary | ICD-10-CM

## 2023-07-03 DIAGNOSIS — M05.79 RHEUMATOID ARTHRITIS INVOLVING MULTIPLE SITES WITH POSITIVE RHEUMATOID FACTOR (H): ICD-10-CM

## 2023-07-03 PROCEDURE — 99243 OFF/OP CNSLTJ NEW/EST LOW 30: CPT | Performed by: PODIATRIST

## 2023-07-03 NOTE — PROGRESS NOTES
Subjective:    Pt is seen today in consult from Isaac Correa with the c/c of painful bilateral foot callus.  This has been symptomatic for the past several years.   Patient points to plantar medial first MTPJ describes this as a burning pain.  Aggravated by activity and releaved by rest.  Has tried  changing shoewear around w/o much success.   She is retired.  She goes barefoot around the house.  Has tried lotion.  Has used a pumice stone.  She also has rheumatoid arthritis.  She has diabetes mellitus.  She has peripheral neuropathy.  No past history of foot ulcers.    ROS:  See above.         Allergies   Allergen Reactions     Seasonal Allergies Other (See Comments)     Itchy watery eyes       Current Outpatient Medications   Medication Sig Dispense Refill     albuterol (PROAIR HFA/PROVENTIL HFA/VENTOLIN HFA) 108 (90 Base) MCG/ACT inhaler Inhale 2 puffs into the lungs every 6 hours as needed for shortness of breath, wheezing or cough 18 g 0     aspirin (ASA) 81 MG EC tablet Take 1 tablet (81 mg) by mouth daily 90 tablet 3     atorvastatin (LIPITOR) 40 MG tablet Take 1 tablet (40 mg) by mouth daily 90 tablet 1     blood glucose (PRECISION QID TEST) test strip        budesonide-formoterol (SYMBICORT) 80-4.5 MCG/ACT Inhaler Inhale 2 puffs into the lungs 2 times daily 30.6 g 1     buPROPion (WELLBUTRIN SR) 150 MG 12 hr tablet Take 1 tablet (150 mg) by mouth daily 90 tablet 3     calcium citrate (CITRACAL) 950 (200 Ca) MG tablet Take 200 mg by mouth       cholecalciferol 25 MCG (1000 UT) TABS Take 25 mcg by mouth       citalopram (CELEXA) 20 MG tablet Take 1 tablet (20 mg) by mouth daily 90 tablet 1     clobetasol (TEMOVATE) 0.05 % external ointment        dapagliflozin (FARXIGA) 5 MG TABS tablet Take 1 tablet (5 mg) by mouth daily 90 tablet 1     dicyclomine (BENTYL) 10 MG capsule TAKE 1 CAPSULE BY MOUTH FOUR TIMES A DAY AS NEEDED FOR ABDOMINAL PAIN OR CRAMPS       furosemide (LASIX) 20 MG tablet Take 1 tablet (20 mg) by  "mouth 2 times daily 180 tablet 1     gabapentin (NEURONTIN) 300 MG capsule Take 1 capsule (300 mg) by mouth 3 times daily 270 capsule 1     glucose-vitamin C 4-6 GM-MG CHEW Take 4 g by mouth       insulin aspart (NOVOLOG PEN) 100 UNIT/ML pen Inject 6 Units Subcutaneous 3 times daily (with meals) 15 mL 4     insulin glargine (LANTUS SOLOSTAR) 100 UNIT/ML pen Inject 40 Units Subcutaneous At Bedtime 15 mL 3     insulin syringe-needle U-100 (30G X 1/2\" 0.3 ML) 30G X 1/2\" 0.3 ML miscellaneous Use 4 syringes daily or as directed. For insulin. 360 each 11     insulin syringe-needle U-100 (30G X 1/2\" 0.3 ML) 30G X 1/2\" 0.3 ML miscellaneous Use 1 syringes weekly or as directed.for methotrexate 12 each 11     ipratropium - albuterol 0.5 mg/2.5 mg/3 mL (DUONEB) 0.5-2.5 (3) MG/3ML neb solution Inhale 1 vial (3 mLs) into the lungs every 6 hours as needed for shortness of breath, wheezing or cough 90 mL 3     loratadine-pseudoePHEDrine (CLARITIN-D 12-HOUR) 5-120 MG 12 hr tablet Take 1 tablet by mouth daily       melatonin 5 MG tablet Take 5 mg by mouth At Bedtime       metFORMIN (GLUCOPHAGE) 1000 MG tablet Take 1 tablet (1,000 mg) by mouth 2 times daily (with meals) 180 tablet 3     metoprolol succinate ER (TOPROL XL) 50 MG 24 hr tablet Take 1 tablet (50 mg) by mouth At Bedtime 90 tablet 1     montelukast (SINGULAIR) 10 MG tablet Take 10 mg by mouth       pantoprazole (PROTONIX) 40 MG EC tablet Take 1 tablet (40 mg) by mouth daily 90 tablet 1     pramipexole (MIRAPEX) 0.125 MG tablet Take 2 tabs in afternoon and two tabs in  tablet 1     predniSONE (DELTASONE) 10 MG tablet        sodium chloride (NEBUSAL) 3 % neb solution Inhale 4 mLs into the lungs       valsartan (DIOVAN) 80 MG tablet Take 1 tablet (80 mg) by mouth At Bedtime 90 tablet 0     folic acid (FOLVITE) 1 MG tablet Take 1 mg by mouth (Patient not taking: Reported on 7/3/2023)       methotrexate 250 MG/10ML SOLN injection Inject 0.8 mLs (20 mg) Subcutaneous once a " week (Patient not taking: Reported on 7/3/2023) 10 mL 3       Patient Active Problem List   Diagnosis     Atherosclerotic heart disease of native coronary artery without angina pectoris     Depression     Type 2 diabetes mellitus with hyperglycemia, with long-term current use of insulin (H)     Gastroesophageal reflux disease     History of malignant neoplasm of breast     History of severe acute respiratory syndrome coronavirus 2 (SARS-CoV-2) disease     History of squamous cell carcinoma of skin     Hyperlipidemia     Congestive heart failure (H)     Morbid obesity (H)     Neuropathy, peripheral     Moderate persistent asthma     Migraine headache     Lichen sclerosus     Obstructive sleep apnea syndrome in adult     Restless leg syndrome     Seasonal allergies     Sensorineural hearing loss (SNHL) of both ears     Status post bilateral mastectomy     Rheumatoid arthritis involving multiple sites with positive rheumatoid factor (H)     Female cystocele       No past medical history on file.    Past Surgical History:   Procedure Laterality Date     HYSTERECTOMY, PAP NO LONGER INDICATED       HYSTERECTOMY, PAP NO LONGER INDICATED       MASTECTOMY, BILATERAL         No family history on file.    Social History     Tobacco Use     Smoking status: Never     Passive exposure: Never     Smokeless tobacco: Never   Substance Use Topics     Alcohol use: Not Currently         Objective:  BP (!) 150/64   Pulse 63   Resp 16   Wt 109.4 kg (241 lb 1.6 oz)   LMP  (LMP Unknown)   SpO2 98%   BMI 45.56 kg/m  .      Constitutional/ general:  Pt is in no apparent distress, appears well-nourished.  Cooperative with history and physical exam.     Psych:  The patient answered questions appropriately.  Normal affect.  Seems to have reasonable expectations, in terms of treatment.      Vascular:  Pedal pulses are palpable bilaterally for both the DP and PT arteries.  CFT < 3 sec. ankle edema noted.  Pedal hair growth noted.     Neuro:   Alert and oriented x 3. Coordinated gait.  Light touch sensation is diminished    Derm: Normal texture and turgor.  No erythema, ecchymosis, or cyanosis.  No open lesions.     Musculoskeletal:     Patient is ambulatory without an assistive device or brace  Normal arch with weightbearing.  No forefoot or rear foot deformities noted.  MS 5/5 all compartments.  Normal ROM all forefoot and rearfoot joints.  No equinus. Patient has a bilateral foot callus hallux IPJ plantar medial.  No breakdown in the skin at this time.  No subcutaneous masses noted.  No sinus tracts, ulceration, or drainage.     Component Ref Range & Units 2 wk ago 8 mo ago     Hemoglobin A1C 0.0 - 5.6 % 9.9 High   11.2 High  CM          A/P   Diabetes mellitus with peripheral neuropathy   Callus     Explained to patient this is a mechanical problem.  We use AmLactin on daily to keep soft.  If she wants something stronger will use urea cream and made recommendations.  Discussed importance of offloading.  Stiff shoes at all times both inside and outside and made suggestions.  We will continue to keep these down with a pumice stone.  Watch feet daily.  Good blood sugar control important.  RTC prn.   Thank you for allowing me participate in the care of this patient.        Ramiro Acevedo, WILLAM, FACFAS

## 2023-07-03 NOTE — LETTER
7/3/2023         RE: Rocio Price  [address] [facility] [address]        Dear Colleague,    Thank you for referring your patient, Rcoio Price, to the [facility]. Please see a copy of my visit note below.    Subjective:    Pt is seen today in consult from Isaac Correa with the c/c of painful bilateral foot callus.  This has been symptomatic for the past several years.   Patient points to plantar medial first MTPJ describes this as a burning pain.  Aggravated by activity and releaved by rest.  Has tried  changing shoewear around w/o much success.   She is retired.  She goes barefoot around the house.  Has tried lotion.  Has used a pumice stone.  She also has rheumatoid arthritis.  She has diabetes mellitus.  She has peripheral neuropathy.  No past history of foot ulcers.    ROS:  See above.         Allergies   Allergen Reactions     Seasonal Allergies Other (See Comments)     Itchy watery eyes       Current Outpatient Medications   Medication Sig Dispense Refill     albuterol (PROAIR HFA/PROVENTIL HFA/VENTOLIN HFA) 108 (90 Base) MCG/ACT inhaler Inhale 2 puffs into the lungs every 6 hours as needed for shortness of breath, wheezing or cough 18 g 0     aspirin (ASA) 81 MG EC tablet Take 1 tablet (81 mg) by mouth daily 90 tablet 3     atorvastatin (LIPITOR) 40 MG tablet Take 1 tablet (40 mg) by mouth daily 90 tablet 1     blood glucose (PRECISION QID TEST) test strip        budesonide-formoterol (SYMBICORT) 80-4.5 MCG/ACT Inhaler Inhale 2 puffs into the lungs 2 times daily 30.6 g 1     buPROPion (WELLBUTRIN SR) 150 MG 12 hr tablet Take 1 tablet (150 mg) by mouth daily 90 tablet 3     calcium citrate (CITRACAL) 950 (200 Ca) MG tablet Take 200 mg by mouth       cholecalciferol 25 MCG (1000 UT) TABS Take 25 mcg by mouth       citalopram (CELEXA) 20 MG tablet Take 1 tablet (20 mg) by mouth daily 90 tablet 1     clobetasol (TEMOVATE) 0.05 % external ointment         "dapagliflozin (FARXIGA) 5 MG TABS tablet Take 1 tablet (5 mg) by mouth daily 90 tablet 1     dicyclomine (BENTYL) 10 MG capsule TAKE 1 CAPSULE BY MOUTH FOUR TIMES A DAY AS NEEDED FOR ABDOMINAL PAIN OR CRAMPS       furosemide (LASIX) 20 MG tablet Take 1 tablet (20 mg) by mouth 2 times daily 180 tablet 1     gabapentin (NEURONTIN) 300 MG capsule Take 1 capsule (300 mg) by mouth 3 times daily 270 capsule 1     glucose-vitamin C 4-6 GM-MG CHEW Take 4 g by mouth       insulin aspart (NOVOLOG PEN) 100 UNIT/ML pen Inject 6 Units Subcutaneous 3 times daily (with meals) 15 mL 4     insulin glargine (LANTUS SOLOSTAR) 100 UNIT/ML pen Inject 40 Units Subcutaneous At Bedtime 15 mL 3     insulin syringe-needle U-100 (30G X 1/2\" 0.3 ML) 30G X 1/2\" 0.3 ML miscellaneous Use 4 syringes daily or as directed. For insulin. 360 each 11     insulin syringe-needle U-100 (30G X 1/2\" 0.3 ML) 30G X 1/2\" 0.3 ML miscellaneous Use 1 syringes weekly or as directed.for methotrexate 12 each 11     ipratropium - albuterol 0.5 mg/2.5 mg/3 mL (DUONEB) 0.5-2.5 (3) MG/3ML neb solution Inhale 1 vial (3 mLs) into the lungs every 6 hours as needed for shortness of breath, wheezing or cough 90 mL 3     loratadine-pseudoePHEDrine (CLARITIN-D 12-HOUR) 5-120 MG 12 hr tablet Take 1 tablet by mouth daily       melatonin 5 MG tablet Take 5 mg by mouth At Bedtime       metFORMIN (GLUCOPHAGE) 1000 MG tablet Take 1 tablet (1,000 mg) by mouth 2 times daily (with meals) 180 tablet 3     metoprolol succinate ER (TOPROL XL) 50 MG 24 hr tablet Take 1 tablet (50 mg) by mouth At Bedtime 90 tablet 1     montelukast (SINGULAIR) 10 MG tablet Take 10 mg by mouth       pantoprazole (PROTONIX) 40 MG EC tablet Take 1 tablet (40 mg) by mouth daily 90 tablet 1     pramipexole (MIRAPEX) 0.125 MG tablet Take 2 tabs in afternoon and two tabs in  tablet 1     predniSONE (DELTASONE) 10 MG tablet        sodium chloride (NEBUSAL) 3 % neb solution Inhale 4 mLs into the lungs       " valsartan (DIOVAN) 80 MG tablet Take 1 tablet (80 mg) by mouth At Bedtime 90 tablet 0     folic acid (FOLVITE) 1 MG tablet Take 1 mg by mouth (Patient not taking: Reported on 7/3/2023)       methotrexate 250 MG/10ML SOLN injection Inject 0.8 mLs (20 mg) Subcutaneous once a week (Patient not taking: Reported on 7/3/2023) 10 mL 3       Patient Active Problem List   Diagnosis     Atherosclerotic heart disease of native coronary artery without angina pectoris     Depression     Type 2 diabetes mellitus with hyperglycemia, with long-term current use of insulin (H)     Gastroesophageal reflux disease     History of malignant neoplasm of breast     History of severe acute respiratory syndrome coronavirus 2 (SARS-CoV-2) disease     History of squamous cell carcinoma of skin     Hyperlipidemia     Congestive heart failure (H)     Morbid obesity (H)     Neuropathy, peripheral     Moderate persistent asthma     Migraine headache     Lichen sclerosus     Obstructive sleep apnea syndrome in adult     Restless leg syndrome     Seasonal allergies     Sensorineural hearing loss (SNHL) of both ears     Status post bilateral mastectomy     Rheumatoid arthritis involving multiple sites with positive rheumatoid factor (H)     Female cystocele       No past medical history on file.    Past Surgical History:   Procedure Laterality Date     HYSTERECTOMY, PAP NO LONGER INDICATED       HYSTERECTOMY, PAP NO LONGER INDICATED       MASTECTOMY, BILATERAL         No family history on file.    Social History     Tobacco Use     Smoking status: Never     Passive exposure: Never     Smokeless tobacco: Never   Substance Use Topics     Alcohol use: Not Currently         Objective:  BP (!) 150/64   Pulse 63   Resp 16   Wt 109.4 kg (241 lb 1.6 oz)   LMP  (LMP Unknown)   SpO2 98%   BMI 45.56 kg/m  .      Constitutional/ general:  Pt is in no apparent distress, appears well-nourished.  Cooperative with history and physical exam.     Psych:  The  patient answered questions appropriately.  Normal affect.  Seems to have reasonable expectations, in terms of treatment.      Vascular:  Pedal pulses are palpable bilaterally for both the DP and PT arteries.  CFT < 3 sec. ankle edema noted.  Pedal hair growth noted.     Neuro:  Alert and oriented x 3. Coordinated gait.  Light touch sensation is diminished    Derm: Normal texture and turgor.  No erythema, ecchymosis, or cyanosis.  No open lesions.     Musculoskeletal:     Patient is ambulatory without an assistive device or brace  Normal arch with weightbearing.  No forefoot or rear foot deformities noted.  MS 5/5 all compartments.  Normal ROM all forefoot and rearfoot joints.  No equinus. Patient has a bilateral foot callus hallux IPJ plantar medial.  No breakdown in the skin at this time.  No subcutaneous masses noted.  No sinus tracts, ulceration, or drainage.     Component Ref Range & Units 2 wk ago 8 mo ago     Hemoglobin A1C 0.0 - 5.6 % 9.9 High   11.2 High  CM          A/P   Diabetes mellitus with peripheral neuropathy   Callus     Explained to patient this is a mechanical problem.  We use AmLactin on daily to keep soft.  If she wants something stronger will use urea cream and made recommendations.  Discussed importance of offloading.  Stiff shoes at all times both inside and outside and made suggestions.  We will continue to keep these down with a pumice stone.  Watch feet daily.  Good blood sugar control important.  RTC prn.   Thank you for allowing me participate in the care of this patient.        Ramiro Acevedo DPM, FACFAS            Again, thank you for allowing me to participate in the care of your patient.        Sincerely,        Ramiro Acevedo DPM

## 2023-07-18 ENCOUNTER — PATIENT OUTREACH (OUTPATIENT)
Dept: FAMILY MEDICINE | Facility: CLINIC | Age: 63
End: 2023-07-18

## 2023-07-18 NOTE — TELEPHONE ENCOUNTER
Received a vm from pt,     -Regarding farxiga prescription  -Pt has been using a 's coupon reducing price of medication to $553  -She found she can get a card for a better price, however she will need a new prescription written.   -She would like to know if dose will remain 5 mg or increase to 10 mg?  -Requesting a call back to [phone]    Called and spoke with pt,     -Asked pt if she has the name of the pharmacy to send the prescription and she does not know at this time.     Pt to call and gather more information on where to send medication and will discuss with Lorraine at [facility] visit on 7/20. She denies any further questions or concerns at this time.     Filomena BOJORQUEZ RN   PAL (Patient Advocate Liaison)  [facility]  [phone]

## 2023-07-20 ENCOUNTER — VIRTUAL VISIT (OUTPATIENT)
Dept: PHARMACY | Facility: CLINIC | Age: 63
End: 2023-07-20

## 2023-07-20 DIAGNOSIS — Z79.4 TYPE 2 DIABETES MELLITUS WITH HYPERGLYCEMIA, WITH LONG-TERM CURRENT USE OF INSULIN (H): Primary | ICD-10-CM

## 2023-07-20 DIAGNOSIS — E11.65 TYPE 2 DIABETES MELLITUS WITH HYPERGLYCEMIA, WITH LONG-TERM CURRENT USE OF INSULIN (H): ICD-10-CM

## 2023-07-20 DIAGNOSIS — F32.A DEPRESSION, UNSPECIFIED DEPRESSION TYPE: ICD-10-CM

## 2023-07-20 DIAGNOSIS — E11.65 TYPE 2 DIABETES MELLITUS WITH HYPERGLYCEMIA, WITH LONG-TERM CURRENT USE OF INSULIN (H): Primary | ICD-10-CM

## 2023-07-20 DIAGNOSIS — M05.79 RHEUMATOID ARTHRITIS INVOLVING MULTIPLE SITES WITH POSITIVE RHEUMATOID FACTOR (H): ICD-10-CM

## 2023-07-20 DIAGNOSIS — I50.9 CONGESTIVE HEART FAILURE, UNSPECIFIED HF CHRONICITY, UNSPECIFIED HEART FAILURE TYPE (H): ICD-10-CM

## 2023-07-20 DIAGNOSIS — J30.1 SEASONAL ALLERGIC RHINITIS DUE TO POLLEN: ICD-10-CM

## 2023-07-20 DIAGNOSIS — J45.40 MODERATE PERSISTENT ASTHMA WITHOUT COMPLICATION: ICD-10-CM

## 2023-07-20 DIAGNOSIS — R03.0 ELEVATED BP WITHOUT DIAGNOSIS OF HYPERTENSION: ICD-10-CM

## 2023-07-20 DIAGNOSIS — G25.81 RESTLESS LEG SYNDROME: ICD-10-CM

## 2023-07-20 DIAGNOSIS — Z79.4 TYPE 2 DIABETES MELLITUS WITH HYPERGLYCEMIA, WITH LONG-TERM CURRENT USE OF INSULIN (H): ICD-10-CM

## 2023-07-20 DIAGNOSIS — G62.9 NEUROPATHY: ICD-10-CM

## 2023-07-20 DIAGNOSIS — E78.5 HYPERLIPIDEMIA LDL GOAL <70: ICD-10-CM

## 2023-07-20 DIAGNOSIS — K21.00 GASTROESOPHAGEAL REFLUX DISEASE WITH ESOPHAGITIS WITHOUT HEMORRHAGE: ICD-10-CM

## 2023-07-20 PROCEDURE — 99605 MTMS BY PHARM NP 15 MIN: CPT | Mod: VID | Performed by: PHARMACIST

## 2023-07-20 PROCEDURE — 99607 MTMS BY PHARM ADDL 15 MIN: CPT | Mod: VID | Performed by: PHARMACIST

## 2023-07-20 RX ORDER — BLOOD-GLUCOSE SENSOR
1 EACH MISCELLANEOUS
COMMUNITY
End: 2024-03-05

## 2023-07-20 RX ORDER — DAPAGLIFLOZIN 10 MG/1
10 TABLET, FILM COATED ORAL DAILY
Qty: 90 TABLET | Refills: 1 | Status: SHIPPED | OUTPATIENT
Start: 2023-07-20 | End: 2023-07-20

## 2023-07-20 RX ORDER — VALSARTAN 160 MG/1
160 TABLET ORAL DAILY
Qty: 90 TABLET | Refills: 0 | Status: SHIPPED | OUTPATIENT
Start: 2023-07-20 | End: 2023-11-30

## 2023-07-20 RX ORDER — DAPAGLIFLOZIN 10 MG/1
10 TABLET, FILM COATED ORAL DAILY
Qty: 90 TABLET | Refills: 1 | Status: SHIPPED | OUTPATIENT
Start: 2023-07-20 | End: 2023-10-12

## 2023-07-20 NOTE — PATIENT INSTRUCTIONS
Recommendations from today's MTM visit:                                                      Diabetes:  1. Increase Farxiga to 10 mg in the morning. Fax sent to [facility].  3. Move the Lantus 40 units daily to the morning to prevent missed doses  4. Restart blood sugar monitoring, if renetta continues to fall off may consider the lower abdomen as trial but need to calibrate with fingerstick as product approved for upper arm use, not abdomen.  - next visit: if not working, then consider Dexcom G6/7 or Pogo device   5. Please follow directions to share renetta readings with me, I will check via [employer] in a few weeks on your blood sugars.   Connect to the Clinic on the [employer] [employer].  Step 1: Open the Settings Menu. Click the three blue lines to open the Settings Menu.  Step 2: Click Connected Apps.  Step 3: Click Connect or Manage next to [employer].  Step 4: Clinic Connect to a Practice:  Step 5: Link to a practice: To connect to [facility] enter the following Practice ID: [id number]  in the field provided and click the Add button.       CHF:  1. Mentioned above increase Farxiga  2. Move furosemide 20mg twice daily to 40 mg in the morning     RLS:  1. Move Mirapex afternoon dose earlier in the day. Can consider 2 tabs in the morning or 1 tab in the morning and 1 tab in the evening     Allergies:  1. Do not recommend chronic use of decongestant. Change Loratadin D to loratadine 10 mg daily only    Asthma:  1. No med changes, but if needs albuterol more than 2 times in a day or twice a week, consider escalation by using symbicort for the week instead to help prevent an exacerbation.     Rheumatoid arthritis:  1. I will check if [facility] is able to methotrexate injectable    Follow-up: Return in about 2 months (around 9/18/2023) for covisit.    It was great speaking with you today.  I value your experience and would be very thankful for your time in providing feedback in our clinic survey. In the next few days, you  "may receive an email or text message from [facility] [employer] with a link to a survey related to your  clinical pharmacist.\"     To schedule another MTM appointment, please call the clinic directly or you may call the MTM scheduling line at [phone] or toll-free at [phone].     My Clinical Pharmacist's contact information:                                                      Please feel free to contact me with any questions or concerns you have.      Lorraine Sky, PharmD  Medication Therapy Management Pharmacist  [phone]     "

## 2023-07-20 NOTE — PROGRESS NOTES
-Order for farxiga sent to [facility]. Writer faxed prescription to   [phone].     Filomena BOJORQUEZ RN   PAL (Patient Advocate Liaison)  [facility]  [phone]

## 2023-07-20 NOTE — PROGRESS NOTES
Marrydled with medical director.     New order for Farxiga teed up as PCP is out of office. Local print required, teed up below, sent to covering provider (Dr. Melgar) for signature.    Kelvin Thomas RN  Patient Advocate Liaison (PAL)  [facility]    07/20/2023 at 2:14 PM

## 2023-07-20 NOTE — PROGRESS NOTES
Medication Therapy Management (MTM) Encounter    ASSESSMENT:                            Medication Adherence/Access: See below for considerations    Type 2 Diabetes:   A1c not at goal < 7%. Microalbumin is at goal < 30 mg/g. Elevated BMI > 30.  Patient would benefit from stay in SGLT2i as mentioned primary care provider, benefit with history of CHF to prevent HF exacerbation and no weight gain. Patient is tolerating therefore would recommend we increase the dose further to improve diabetes control. Patient should start self monitor blood glucose to monitor for hypoglycemia as she is on insulin. If she continues to have issues with continuous glucose monitor meron - may consider Dexcom but cost could be a barrier. Or, would consider POGO blood sugar monitor device which may be less burdensome than normal fingerstick from glucometer.     HFpEF/Hypertension:   Patient benefits from SGLT2i for CHF and t2DM. However, blood pressure is not at goal of < 130/80 mm Hg, addition of SGLT2i unlikely to reduce blood pressure down to goal. Recommend titration in ARB, plan on lab recheck after dose adjustment.   Recommend changing loop diuretic dose instruction to decrease nocturia risk.   Patient should continue on aspirin.    Hyperlipidemia:   Stable, LDL < 70 mg/dL.    Asthma:   Stable, but continue to monitor closely and suggest escalating therapy if more symptoms. She would be a good candidate for SMART but cost is an issue, therefore continue with as needed agents that she has.     Depression:  Unable to review in detail today due to time, no changes suggested at this time. I would like a further discussion to identify why patient is on SR formulation of bupropion.    RLS:   Patient to trial adjusting the timing of Mirapex vs increasing dose. High risk for augmentation especially with her report of daytime symptoms right now. Her last ferritin was over a year ago and <75.     RA:  Patient's pain is worse off the  methotrexate. Will check to see if the [place] pharmacy has any access to subcutaneous but there is an ongoing shortage, per FDA shortage site supply should be improving in August-September of this year.     Neuropathy:  Continue to work on improve diabetes first.    Allergic Rhinitis:   Recommend she stop decongestant and only take antihistamine to minimize adverse drug reactions.    GERD:   Stable, unable to review history and PPI long-term use safety concerns.    PLAN:                            Diabetes:  1. Per patient request, will send Rx F[facility] to [facility] for better cost coverage:  [phone] (Fax #)  [phone] (Ph#)   2. Increase Farxiga to 10 mg in the morning.  3. Move the Lantus 40 units daily to the morning to prevent missed doses  4. Restart blood sugar monitoring, if meron continues to fall off may consider the lower abdomen as trial but need to calibrate with fingerstick as product approved for upper arm use, not abdomen.  - next visit: if not working, then consider Dexcom G6/7 or Pogo device   5. Please follow directions to share meron readings with me, I will check via [employer] in a few weeks on your blood sugars.     CHF:  1. Mentioned above increase Farxiga  2. Move furosemide 20mg twice daily to 40 mg in the morning     RLS:  1. Move Mirapex afternoon dose earlier in the day. Can consider 2 tabs in the morning or 1 tab in the morning and 1 tab in the evening     Allergies:  1. Do not recommend chronic use of decongestant. Change Loratadin D to loratadine 10 mg daily only    Asthma:  1. No med changes, but if needs albuterol more than 2 times in a day or twice a week, consider escalation by using symbicort for the week instead to help prevent an exacerbation. (mentioned SMART therapy with Symbicort, but with cost concern continue with YECENIA as needed).     Rheumatoid arthritis:  1. I will check if [facility] is able to methotrexate injectable. --> I have a message out to  medication therapy management pharmD in rheumatology for suggestions.    Follow-up: 9/18 co-visit primary care provider Malathi Caban (patient is aware private pay)  Next visit:  1) RLS: ferritin check  2) continuous glucose monitor dexcom or Pogo?  3) review vaccines, long-term PPI use, and GI history she has dicyclomine as needed?  4) labs Basic metabolic panel, a1c  5) bupropion XL vs SR?    SUBJECTIVE/OBJECTIVE:                          Rocio Price is a 63 year old female contacted via secure video for an initial visit. She was referred to me from Behzad Correa PA-C.      Reason for visit: diabetes    Allergies/ADRs: Reviewed in chart  Past Medical History: Reviewed in chart  Tobacco: She reports that she has never smoked. She has never been exposed to tobacco smoke. She has never used smokeless tobacco.  Alcohol: rarely    Medication Adherence/Access:   Patient takes medications out of a pill box  Patient takes medications 3 time(s) per day:   -- Morning: metformin, citalopram, atorvastatin, bupropion, pantoprazole, gabapentin, furosemide 20mg  -- Afternoon: gabapentin, pramipexole, furosemide  -- Before bedtime: gabapentin, pramipexole, metformin, valsartan, montelukast, metoprolol  Per patient, misses at bedtime Lantus a few times in a month  Medication barriers: affording medications - Farxiga, no prescription insurance  The patient fills medications at [place]: NO, fills medications at various pharmacy for best price due to lack of medication insurance.    Type 2 Diabetes:    Farxiga 5 mg daily - morning   Lantus 40 units at bedtime   Novolog 14 units before/after meals  Metformin 1000 mg twice daily   Glucose chew as needed hypoglycemia    Side effects: none  Blood sugar monitoring: occasionally; Ranges: (patient reported) no readings to share, not checked recently. Use to have continuous glucose monitor meron 3 but shares with me that the meron would fall off. She has tried adhesive wipe and using  a bandage.   Current diabetes symptoms: numbness/tingling.  Eye exam: due  Foot exam: due    HFpEF/Hypertension:   valsartan 80mg daily  Metoprolol ER 50 mg daily  Furosemide 20 mg twice daily   dapagliflozin 5 mg daily   Aspirin 81 mg daily - needs to  a new bottle    Swelling in the ankles if on her feet all day. Otherwise no CHF symptoms reported. From chart review, patient had CT cardiac angiogram on 12/10/2018 which noted moderate stenosis of the proximal LAD. Last ECHO from 7/1/2022 with EF of 60%.   Patient reports no current medication side effects.     Patient is not measuring daily weights but she has a scale at home that she can check. She noticed weight was down at specialist office visit recently.  Patient does not self-monitor blood pressure.   Patient is somewhat following a low sodium diet, is avoiding EtOH.    Hyperlipidemia:   atorvastatin 40 mg daily    Patient reports no significant myalgias or other side effects.    Asthma:   ICS/LABA - Symbicort 80/4.5 mcg - 2 puffs twice daily - not using  Montelukast 10mg at bedtime     Albuterol (ProAir/Ventolin/Proventil)  Ipratropium and albuterol Neb (DuoNeb)  Saline neb     Not using any of the rescue agents. No symptoms.   Triggers include: pollens.    Depression:  Bupropion  mg once daily  Citalopram 20 mg once daily    Patient is not experiencing side effects. She shares the changes in her life this past year. She had quit her job to take care of her father who was in hospice for a year. He has passed away since so now readjusting back and now recently established with Behzad Correa for primary care.    RLS:   Mirapex 0.125mg tablet,  2 tablets afternoon and 2 tablet right before    Experiencing RLS late morning 11am since reducing dose from 2 tablets 3 times daily. Dealing wth RLS ongoing for about 1.5 year now. Bothersome symptoms are usually the morning.     RA:  Methotrexate 0.8 mL (20mg) subcutaneous weekly  - last dose was 3 weeks  ago  Folic 1 mg daily while methotrexate only     Has noticed worsen symptoms since gap. Waking up at night from the hand and shoulder. Cant clench her fist. Thumb and middle finger is locking up that she thinks is carpal tunnel. Joints are severe.   Referred to rheumatology, but has an appointment in September.     Neuropathy:  Gabapentin 300 mg 3 times daily     Both shoulders and down the left arm is very painful. Impacting her sleep. She reports a burning pain sensation down that arm. No side effects noted.     Allergic Rhinitis:   Loratadine-D 10 mg once daily  Montelukast 10mg at bedtime     Primary symptoms are nasal congestion, runny nose and itchy/watery eyes. Most common with seasonal. Patient has been on this only and stayed on since it is working.     GERD:   Pantoprazole 40  mg once daily   Dicyclomine as needed abdominal cramps    Patient reports no current symptoms.  Patient feels that current regimen is effective.    Today's Vitals: LMP  (LMP Unknown)   ----------------      I spent 60 minutes with this patient today. All changes were made via collaborative practice agreement with Behzad Correa PA-C. A copy of the visit note was provided to the patient's provider(s).    A summary of these recommendations was sent via [employer].    Lorraine Sky, PharmD  Medication Therapy Management Pharmacist  [phone]    Telemedicine Visit Details  Type of service:  Video Conference via [employer]  Start Time: 8 AM  End Time: 9 AM     Medication Therapy Recommendations  Congestive heart failure (H)    Current Medication: furosemide (LASIX) 20 MG tablet   Rationale: Undesirable effect - Adverse medication event - Safety   Recommendation: Change Administration Time   Status: Patient Agreed - Adherence/Education          Current Medication: valsartan (DIOVAN) 160 MG tablet   Rationale: Dose too low - Dosage too low - Effectiveness   Recommendation: Increase Dose   Status: Accepted per CPA         Restless leg  syndrome    Current Medication: pramipexole (MIRAPEX) 0.125 MG tablet   Rationale: Condition refractory to medication - Ineffective medication - Effectiveness   Recommendation: Change Administration Time   Status: Patient Agreed - Adherence/Education         Seasonal allergies    Current Medication: loratadine-pseudoePHEDrine (CLARITIN-D 12-HOUR) 5-120 MG 12 hr tablet   Rationale: Unsafe medication for the patient - Adverse medication event - Safety   Recommendation: Change Medication - loratadine 10 MG tablet   Status: Accepted - no CPA Needed         Type 2 diabetes mellitus with hyperglycemia, with long-term current use of insulin (H)    Current Medication: Continuous Blood Gluc Sensor (FREESTYLE REYNOLD 3 SENSOR) MISC   Rationale: Patient forgets to take - Adherence - Adherence   Recommendation: Provide Adherence Intervention   Status: Patient Agreed - Adherence/Education          Current Medication: dapagliflozin (FARXIGA) 10 MG TABS tablet   Rationale: Dose too low - Dosage too low - Effectiveness   Recommendation: Increase Dose   Status: Accepted per CPA          Current Medication: insulin glargine (LANTUS SOLOSTAR) 100 UNIT/ML pen   Rationale: Patient forgets to take - Adherence - Adherence   Recommendation: Change Administration Time   Status: Patient Agreed - Adherence/Education

## 2023-07-26 DIAGNOSIS — M05.79 RHEUMATOID ARTHRITIS INVOLVING MULTIPLE SITES WITH POSITIVE RHEUMATOID FACTOR (H): Primary | ICD-10-CM

## 2023-07-26 RX ORDER — METHOTREXATE 20 MG/.4ML
20 INJECTION, SOLUTION SUBCUTANEOUS
Qty: 4.8 ML | Refills: 0 | Status: SHIPPED | OUTPATIENT
Start: 2023-07-26 | End: 2023-10-12

## 2023-07-26 NOTE — PROGRESS NOTES
"See below. Order sent.     -esperanza harris, ruy Sky, Lorraine Calderon, [facility]  Sunny, Behzad Roberts, GÓMEZ Gray,    Recently met with you new patient. Wondering if you would be comfortable with send rx until she is established in sept with new rheum.    Methotrexate subcutaneous 250/10mL injection on back order. Patient takes 20 mg weekly typically, last dose 3 weeks ago. Back order nationally but may be back in Aug-Sept.    I reached out to rheum [facility] colleague for ideas:    \"Try for Rasuvo autoinjectors - its preservative free methotrexate but the autoinjectors haven't been on backorder (yet). You can send into their regular pharmacy but you will need to do a PA. If you end up needing to appeal I have a template .[email or web address]\"    Rasuvo subcutaneous 20 mg weekly would be the SIG if so.    Let me know what you think! (When you are back I will actually be out of the clinic as well, if you are in agreement please send Rx or if you want covering [facility] to send for you please specify, thank you!)    Lorraine Sky, PharmD  Medication Therapy Management Pharmacist  [phone]  "

## 2023-08-21 NOTE — PROGRESS NOTES
Rheumatology Clinic Visit  [facility]  Jessica LorettajulietVINCE richardson     Rocio Price MRN# [id number]   YOB: 1960 Age: 63 year old   Date of Visit: 8/23/2023  Primary care provider: Behzad Correa          Assessment and Plan:     1.  Seronegative erosive rheumatoid arthritis  2.  High-risk medication use    Patient presents today to establish care for her seronegative erosive rheumatoid arthritis.  She was being seen at the [facility] and was prescribed methotrexate.  She has since been off methotrexate for approximately 6 months.  She has had an increase in swelling particularly in her hands.  She has pain in her elbow as well.  She does report that while she was on the methotrexate she did not notice significant benefit.  Physical examination today does show active synovitis over multiple MCPs and PIPs bilaterally.  She does have swelling over the left wrist as well.  Tenderness to palpation of the right elbow.  Previous laboratory evaluations and imaging studies were reviewed, results below.    Discussed with the patient that we do need to get her back started on treatment.  We discussed different options which included restarting the methotrexate and after stabilizing on that medication consideration of adding on another oral medication such as sulfasalazine versus starting her on a biologic.  Given the amount of swelling that she has on examination she has elected to start a biologic.  We will start her on Humira.  Side effects of this medication were reviewed.  She does have a reported history of congestive heart failure.  Her last echocardiogram was done in July 2022 showed a mildly enlarged left ventricular chamber size with a ejection fraction of 60% and a normal right ventricular chamber side. Discussed with the patient that we can always check this in the future and certainly if she were to have any symptoms suggestive of worsening heart failure such as edema or shortness of  breath she needs to have this evaluated.  She verbalized understanding.  We did also discuss that the data reporting new onset heart failure and worsening of heart failure has been conflicting, therefore the risk is unclear.  We also discussed the importance of holding the medication should she have any signs of infection.  Discussed that this is an immunosuppressant and can increase her risk of infections.  She verbalized her understanding.  We will check her hepatitis panel as well as tuberculosis panel today.  We also get baseline labs and check every 6 months while she is on the Humira.  Patient will follow-up with me in 3 months, sooner if needed.  In the meantime given the amount of swelling that she is having we will start her on prednisone.  She does have diabetes and will manage her blood sugars and adjust her insulin as needed.    Plan:     Schedule follow-up with Jessica Dahl PA-C in 3 months.   Labs: Hep B and C, TB, CBC, Creatinine, Albumin, AST, ALT, CRP and Sed Rate  Medication recommendations:   Prednisone 5mg: Take 10 mg (2 tablets) daily x1 week, then take 7.5 mg (1.5 tablets) daily x1 week, then take 5 mg (1 tablet) daily x1 week then stop.  Take with food.  Humira 40mg/0.4mL SubQ every 14 days.   # Adalimumab (Humira) Risks and Benefits: The risks and benefits of adalimumab were discussed in detail and the patient verbalized understanding.  The risks discussed include, but are not limited to, the risk for hypersensitivity, anaphylaxis, anaphylactoid reactions, an increased risk for serious infections leading to hospitalization or death, a possible increased risk for lymphoma and other malignancies, a possible worsening of demyelinating diseases, a possible worsening of heart failure, risk for cytopenias, risk for drug induced lupus, possible reactivation of hepatitis B, and possible reactivation of latent tuberculosis.  Subcutaneous injections may result in injection site reactions and/or pain  at the site of injection.  The most common adverse reactions are infections, injection site reactions, headache, and rash.  It was discussed that the medication would need to be discontinued if a serious infection develops.  It was discussed that live vaccinations should not be received while using adalimumab or within 30 days prior to starting adalimumab.  I encouraged reviewing the package insert and asking any questions about the medication.      Jessica Dahl, [facility]  Rheumatology         History of Present Illness:   Rocio Price presents for evaluation of rheumatoid arthritis.      She was diagnosed in 2021. She was put on Methotrexate and has been off of that for about 6 months. She has been having increased pain and swelling. She states that her left hand has been getting worse. She did not feel the methotrexate was working for her. She was given Prednisone, which was slightly helpful. She states that [facility] was discussing putting her on a different medication, but she cannot remember which medication it was. She gets pain from her right shoulder down the right arm. She is unable to sleep on her right side. She finds that she will get numbness down the arm as well. The right elbow will get swollen, and the swelling and tenderness can go down the arm as well. No shortness of breath.     No history of infections. No history of MI's or blood clots. She does have high blood pressure and diabetes. She has congestive heart failure. She has a history of diverticulitis.     Rheumatological history:  Provider(s): Dr. Wallace  Pertinent lab history: HLA B27 positive, elevated Sed Rate and CRP, negative MYRON,  negative CCP and RF  MRI showed erosions and synovitis  Previous medications tried: Prednisone  Current medications: methotrexate 20mg SubQ           Review of Systems:     Constitutional: negative  Skin: negative  Eyes: negative  Ears/Nose/Throat: negative  Respiratory: No shortness of breath, dyspnea on  exertion, cough, or hemoptysis  Cardiovascular: negative  Gastrointestinal: negative  Genitourinary: negative  Musculoskeletal: as above  Neurologic: negative  Psychiatric: negative  Hematologic/Lymphatic/Immunologic: negative  Endocrine: negative         Active Problem List:     Patient Active Problem List    Diagnosis Date Noted    Atherosclerotic heart disease of native coronary artery without angina pectoris 10/27/2022     Priority: Medium     Formatting of this note might be different from the original.  moderate LAD stenosis by CT angiography      History of malignant neoplasm of breast 10/27/2022     Priority: Medium     Formatting of this note might be different from the original.  2013      History of squamous cell carcinoma of skin 10/27/2022     Priority: Medium    Rheumatoid arthritis involving multiple sites with positive rheumatoid factor (H) 10/27/2022     Priority: Medium    Moderate persistent asthma 02/04/2022     Priority: Medium     Last Assessment & Plan:   Formatting of this note might be different from the original.  Patient seems to be doing fairly well on her current regimen of Symbicort and albuterol.  Will continue to monitor.  Consider repeat PFTs further out from acute illness.      Depression 01/13/2022     Priority: Medium    Gastroesophageal reflux disease 01/13/2022     Priority: Medium    Hyperlipidemia 01/13/2022     Priority: Medium    Migraine headache 01/12/2022     Priority: Medium     Formatting of this note might be different from the original.  Associated with photophobia. Treated with over-the-counter medications, no prescriptions.      History of severe acute respiratory syndrome coronavirus 2 (SARS-CoV-2) disease 11/19/2021     Priority: Medium    Neuropathy, peripheral 02/04/2020     Priority: Medium    Congestive heart failure (H) 12/19/2018     Priority: Medium     Formatting of this note might be different from the original.  preserved ejection fraction, EF 60% ECHO  12/14/18    Last Assessment & Plan:   Formatting of this note might be different from the original.  HFpEF, EF 60% on Echo 12/14/2018  Continue aspirin 81 mg  Continue metoprolol succinate 50mg  Continue furosemide 40 mg daily      Type 2 diabetes mellitus with hyperglycemia, with long-term current use of insulin (H) 08/28/2017     Priority: Medium     Formatting of this note is different from the original.  Current Meds: Aspart insulin 6-10 units with meals as needed. Insulin glargine 50 units at bedtime. Metformin 1000 mg BID.     [place] D 4/5 (A1c)  Blood pressure: /64 (BP Location: Right arm, Patient Position: Sitting, Cuff Size: Large)  prior: 100/65  Statin: Atorvastatin 40mg   Aspirin: 81 mg daily  Tobacco: non-smoker  A1c:   Lab Results   Component Value Date    HGBA1C 10.5 (H) 01/11/2022    HGBA1C 10.8 (H) 09/08/2020    HGBA1C 12.6 (H) 01/10/2020     Renal Health: Stage 2 (eGFR 60-89)  Lab Results   Component Value Date    EGFRNONBLKAA 75 01/13/2022     Lab Results   Component Value Date    UMCROALBCONC <5.0 08/25/2017    CREATININEUR 108 01/10/2020    ALBCREARATIO <5 01/10/2020         Last Assessment & Plan:   Formatting of this note might be different from the original.  She has made great improvement since last check a few weeks ago both in diet and activity levels. This is greatly reflected in her blood sugar readings. I don't think we need to add additional medications at this time for diabetic control. Will recheck A1c in 2 months.      Morbid obesity (H) 04/14/2017     Priority: Medium    Female cystocele 06/03/2016     Priority: Medium    Obstructive sleep apnea syndrome in adult 11/11/2014     Priority: Medium     Last Assessment & Plan:   Formatting of this note might be different from the original.  Given evidence of significant CO2 retention despite improving respiratory symptoms and utilization of CPAP during hospitalization, will refer back to sleep medicine for consideration of  adjustment of CPAP vs BiPAP.      Status post bilateral mastectomy 03/11/2014     Priority: Medium    Seasonal allergies 09/09/2013     Priority: Medium    Restless leg syndrome 11/17/2010     Priority: Medium    Sensorineural hearing loss (SNHL) of both ears 11/17/2010     Priority: Medium    Lichen sclerosus 09/14/2007     Priority: Medium            Past Medical History:   No past medical history on file.  Past Surgical History:   Procedure Laterality Date    HYSTERECTOMY, PAP NO LONGER INDICATED      HYSTERECTOMY, PAP NO LONGER INDICATED      MASTECTOMY, BILATERAL              Social History:     Social History     Socioeconomic History    Marital status:      Spouse name: Not on file    Number of children: Not on file    Years of education: Not on file    Highest education level: Not on file   Occupational History    Not on file   Tobacco Use    Smoking status: Never     Passive exposure: Never    Smokeless tobacco: Never   Substance and Sexual Activity    Alcohol use: Not Currently    Drug use: Never    Sexual activity: Not on file   Other Topics Concern    Not on file   Social History Narrative    Not on file     Social Determinants of Health     Financial Resource Strain: Not on file   Food Insecurity: Not on file   Transportation Needs: Not on file   Physical Activity: Not on file   Stress: Not on file   Social Connections: Not on file   Intimate Partner Violence: Not on file   Housing Stability: Not on file          Family History:   No family history on file.         Allergies:     Allergies   Allergen Reactions    Seasonal Allergies Other (See Comments)     Itchy watery eyes            Medications:     Current Outpatient Medications   Medication Sig Dispense Refill    albuterol (PROAIR HFA/PROVENTIL HFA/VENTOLIN HFA) 108 (90 Base) MCG/ACT inhaler Inhale 2 puffs into the lungs every 6 hours as needed for shortness of breath, wheezing or cough 18 g 0    aspirin (ASA) 81 MG EC tablet Take 1 tablet  "(81 mg) by mouth daily 90 tablet 3    atorvastatin (LIPITOR) 40 MG tablet Take 1 tablet (40 mg) by mouth daily 90 tablet 1    blood glucose (PRECISION QID TEST) test strip       buPROPion (WELLBUTRIN SR) 150 MG 12 hr tablet Take 1 tablet (150 mg) by mouth daily 90 tablet 3    citalopram (CELEXA) 20 MG tablet Take 1 tablet (20 mg) by mouth daily 90 tablet 1    clobetasol (TEMOVATE) 0.05 % external ointment Apply topically 2 times daily as needed      Continuous Blood Gluc Sensor (FREESTYLE REYNOLD 3 SENSOR) MISC 1 each every 14 days      dapagliflozin (FARXIGA) 10 MG TABS tablet Take 1 tablet (10 mg) by mouth daily 90 tablet 1    dicyclomine (BENTYL) 10 MG capsule TAKE 1 CAPSULE BY MOUTH FOUR TIMES A DAY AS NEEDED FOR ABDOMINAL PAIN OR CRAMPS      furosemide (LASIX) 20 MG tablet Take 1 tablet (20 mg) by mouth 2 times daily 180 tablet 1    gabapentin (NEURONTIN) 300 MG capsule Take 1 capsule (300 mg) by mouth 3 times daily 270 capsule 1    glucose-vitamin C 4-6 GM-MG CHEW Take 4 g by mouth      insulin aspart (NOVOLOG PEN) 100 UNIT/ML pen Inject 6 Units Subcutaneous 3 times daily (with meals) 15 mL 4    insulin glargine (LANTUS SOLOSTAR) 100 UNIT/ML pen Inject 40 Units Subcutaneous At Bedtime 15 mL 3    insulin syringe-needle U-100 (30G X 1/2\" 0.3 ML) 30G X 1/2\" 0.3 ML miscellaneous Use 4 syringes daily or as directed. For insulin. 360 each 11    insulin syringe-needle U-100 (30G X 1/2\" 0.3 ML) 30G X 1/2\" 0.3 ML miscellaneous Use 1 syringes weekly or as directed.for methotrexate 12 each 11    ipratropium - albuterol 0.5 mg/2.5 mg/3 mL (DUONEB) 0.5-2.5 (3) MG/3ML neb solution Inhale 1 vial (3 mLs) into the lungs every 6 hours as needed for shortness of breath, wheezing or cough 90 mL 3    loratadine-pseudoePHEDrine (CLARITIN-D 12-HOUR) 5-120 MG 12 hr tablet Take 1 tablet by mouth daily      melatonin 5 MG tablet Take 5 mg by mouth At Bedtime      metFORMIN (GLUCOPHAGE) 1000 MG tablet Take 1 tablet (1,000 mg) by mouth 2 " "times daily (with meals) 180 tablet 3    metoprolol succinate ER (TOPROL XL) 50 MG 24 hr tablet Take 1 tablet (50 mg) by mouth At Bedtime 90 tablet 1    montelukast (SINGULAIR) 10 MG tablet Take 10 mg by mouth At Bedtime      pantoprazole (PROTONIX) 40 MG EC tablet Take 1 tablet (40 mg) by mouth daily 90 tablet 1    pramipexole (MIRAPEX) 0.125 MG tablet Take 2 tabs in afternoon and two tabs in  tablet 1    sodium chloride (NEBUSAL) 3 % neb solution Inhale 4 mLs into the lungs      valsartan (DIOVAN) 160 MG tablet Take 1 tablet (160 mg) by mouth daily 90 tablet 0    folic acid (FOLVITE) 1 MG tablet Take 1 mg by mouth (Patient not taking: Reported on 7/3/2023)      Methotrexate, Anti-Rheumatic, (RASUVO) 20 MG/0.4ML pen Inject 0.4 mLs (20 mg) Subcutaneous every 7 days for 90 days (Patient not taking: Reported on 2023) 4.8 mL 0            Physical Exam:   Blood pressure (!) 151/84, pulse 58, resp. rate 18, height 1.549 m (5' 1\"), weight 111.7 kg (246 lb 3.2 oz), SpO2 100 %.  Wt Readings from Last 6 Encounters:   23 109.4 kg (241 lb 1.6 oz)   06/15/23 112 kg (246 lb 14.4 oz)   10/27/22 108.2 kg (238 lb 9.6 oz)     Constitutional: well-developed, appearing stated age; cooperative  Eyes: nl conjunctiva, sclera  ENT: nl external ears, nose, hearing, lips,   Neck: no mass or thyroid enlargement  Resp: lungs clear to auscultation  CV: RRR, no murmurs, rubs or gallops, no edema  Lymph: no cervical, supraclavicular or epitrochlear nodes  MS: Active synovitis over multiple MCPs and PIPs bilaterally with associated tenderness.  She has swelling over the dorsum of the left wrist.  Tenderness to palpation of the right elbow.  Skin: no nail pitting, alopecia, rash, nodules or lesions.   Psych: nl judgement, orientation, memory, affect.           Data:   Imagin2022 MR Hand Right without and with IV contrast  IMPRESSION:  1. Although there is a background of mild scattered degenerative arthritis in the right " hand and  wrist, there are erosions in the 2nd and 3rd metacarpal heads and diffuse enhancing synovitis including the MCP joints, out of proportion to degenerative changes. Findings suggest a superimposed inflammatory process.  2. Mild tenosynovial enhancement involving the flexor and extensor tendons of the hand and wrist, which may also be inflammatory.  3. Nonspecific enlargement and T2 hyperintensity of the median nerve with enhancement; this can be  seen in the setting of carpal tunnel syndrome.    04/26/2022   DX ANKLE BILATERAL 3+ VIEWS  IMPRESSION:  Calcaneal spurs. Slight degenerative arthritis both ankles. No erosions. Postoperative  changes bilateral 5th metatarsals.     DX Hand Bilateral 2 Views  IMPRESSION:  Mild scattered degenerative arthritis of both hands. No erosions.     03/30/2022 CT Chest without IV contrast  IMPRESSION:  Interval improvement in the previously new bilateral upper lung predominant groundglass opacities with a couple of areas mild ill-defined residual groundglass, likely due to a resolving infectious/inflammatory process. Interval resolution of the previously new solid nodular consolidative opacities in the right lower lobe with improvement in right lower lobe endobronchial  plugging, also compatible with an infectious/inflammatory etiology. Tiny indeterminate 4 mm posterior right upper lobe ground glass nodule which is unchanged from 02/12/2022, but new since 03/19/2019    Cardiac:  07/01/2022 TTE   Final Impressions  1. Mildly enlarged left ventricular chamber size, no regional wall motion abnormalities, calculated 2-D four chamber monoplane volumetric ejection fraction 60%.  2. Normal right ventricular chamber size, normal systolic function, estimated right ventricular systolic pressure 48 mmHg (right atrial pressure of 10 mmHg).  3. Mildly thickened aortic valve with trivial AR  4. Tiny posterior pericardial effusion.    Noted RVSP of 48.    12/14/2018 Stress Test  Echo  Positive for septal and apical myocardial ischemia    12/10/2018: CT Cardiac Angiogram triple rule out with IV contrast  IMPRESSION:   1. Mild pulmonary edema, and additional right perihilar patchy opacities. These  could represent infectious/inflammatory etiology or possibly asymmetric edema.  2. 1.3 cm semisolid nodule in the right upper lung. This is likely secondary to  #1 above, recommend follow-up in 3-6 months to confirm resolution.  3. Moderate stenosis of the proximal LAD. CAD-RADS category 3. Normal systolic  LV function with no regional wall motion abnormalities.  4. Negative for acute pulmonary embolism.    04/05/2022 ECG: QTc 441, p-mitrale     11/18/2022 bone density scan  IMPRESSION: NORMAL. Bone mineral density measurements are within normal limits using T score.     Laboratory:  10/27/2022  Creatinine 0.70, GFR greater than 90  Hemoglobin A1c 11.2  TSH 1.91  White blood cell count 12.0, hemoglobin 13.1, platelet count 334    6/15/2023  Creatinine 0.74, GFR 90  Hemoglobin A1c 9.9

## 2023-08-23 ENCOUNTER — OFFICE VISIT (OUTPATIENT)
Dept: RHEUMATOLOGY | Facility: CLINIC | Age: 63
End: 2023-08-23

## 2023-08-23 VITALS
HEART RATE: 58 BPM | RESPIRATION RATE: 18 BRPM | OXYGEN SATURATION: 100 % | DIASTOLIC BLOOD PRESSURE: 84 MMHG | BODY MASS INDEX: 46.48 KG/M2 | SYSTOLIC BLOOD PRESSURE: 151 MMHG | HEIGHT: 61 IN | WEIGHT: 246.2 LBS

## 2023-08-23 DIAGNOSIS — M06.00 SERONEGATIVE EROSIVE RHEUMATOID ARTHRITIS (H): Primary | ICD-10-CM

## 2023-08-23 DIAGNOSIS — Z79.899 HIGH RISK MEDICATION USE: ICD-10-CM

## 2023-08-23 LAB
ALBUMIN SERPL BCG-MCNC: 4.3 G/DL (ref 3.5–5.2)
ALT SERPL W P-5'-P-CCNC: 26 U/L (ref 0–50)
AST SERPL W P-5'-P-CCNC: 26 U/L (ref 0–45)
CREAT SERPL-MCNC: 1.08 MG/DL (ref 0.51–0.95)
CRP SERPL-MCNC: 8.07 MG/L
ERYTHROCYTE [DISTWIDTH] IN BLOOD BY AUTOMATED COUNT: 14.5 % (ref 10–15)
ERYTHROCYTE [SEDIMENTATION RATE] IN BLOOD BY WESTERGREN METHOD: 18 MM/HR (ref 0–30)
GFR SERPL CREATININE-BSD FRML MDRD: 57 ML/MIN/1.73M2
HCT VFR BLD AUTO: 43.1 % (ref 35–47)
HGB BLD-MCNC: 13.6 G/DL (ref 11.7–15.7)
MCH RBC QN AUTO: 27.8 PG (ref 26.5–33)
MCHC RBC AUTO-ENTMCNC: 31.6 G/DL (ref 31.5–36.5)
MCV RBC AUTO: 88 FL (ref 78–100)
PLATELET # BLD AUTO: 343 10E3/UL (ref 150–450)
RBC # BLD AUTO: 4.89 10E6/UL (ref 3.8–5.2)
WBC # BLD AUTO: 10.5 10E3/UL (ref 4–11)

## 2023-08-23 PROCEDURE — 85652 RBC SED RATE AUTOMATED: CPT | Performed by: PHYSICIAN ASSISTANT

## 2023-08-23 PROCEDURE — 86704 HEP B CORE ANTIBODY TOTAL: CPT | Performed by: PHYSICIAN ASSISTANT

## 2023-08-23 PROCEDURE — 86481 TB AG RESPONSE T-CELL SUSP: CPT | Performed by: PHYSICIAN ASSISTANT

## 2023-08-23 PROCEDURE — 86803 HEPATITIS C AB TEST: CPT | Performed by: PHYSICIAN ASSISTANT

## 2023-08-23 PROCEDURE — 99204 OFFICE O/P NEW MOD 45 MIN: CPT | Performed by: PHYSICIAN ASSISTANT

## 2023-08-23 PROCEDURE — 86140 C-REACTIVE PROTEIN: CPT | Performed by: PHYSICIAN ASSISTANT

## 2023-08-23 PROCEDURE — 87340 HEPATITIS B SURFACE AG IA: CPT | Performed by: PHYSICIAN ASSISTANT

## 2023-08-23 PROCEDURE — 82565 ASSAY OF CREATININE: CPT | Performed by: PHYSICIAN ASSISTANT

## 2023-08-23 PROCEDURE — 84450 TRANSFERASE (AST) (SGOT): CPT | Performed by: PHYSICIAN ASSISTANT

## 2023-08-23 PROCEDURE — 82040 ASSAY OF SERUM ALBUMIN: CPT | Performed by: PHYSICIAN ASSISTANT

## 2023-08-23 PROCEDURE — 84460 ALANINE AMINO (ALT) (SGPT): CPT | Performed by: PHYSICIAN ASSISTANT

## 2023-08-23 PROCEDURE — 36415 COLL VENOUS BLD VENIPUNCTURE: CPT | Performed by: PHYSICIAN ASSISTANT

## 2023-08-23 PROCEDURE — 85027 COMPLETE CBC AUTOMATED: CPT | Performed by: PHYSICIAN ASSISTANT

## 2023-08-23 RX ORDER — PREDNISONE 5 MG/1
5 TABLET ORAL DAILY
Qty: 32 TABLET | Refills: 0 | Status: SHIPPED | OUTPATIENT
Start: 2023-08-23 | End: 2023-10-12

## 2023-08-23 RX ORDER — METHOTREXATE 20 MG/.4ML
20 INJECTION, SOLUTION SUBCUTANEOUS
Qty: 4.8 ML | Refills: 0 | Status: CANCELLED | OUTPATIENT
Start: 2023-08-23

## 2023-08-23 ASSESSMENT — PAIN SCALES - GENERAL: PAINLEVEL: SEVERE PAIN (6)

## 2023-08-23 NOTE — PATIENT INSTRUCTIONS
After Visit Instructions:     Thank you for coming to [facility] for your care. It is my goal to partner with you to help you reach your optimal state of health.       Plan:     Schedule follow-up with Jessica Dahl PA-C in 3 months.   Labs: Hep B and C, TB, CBC, Creatinine, Albumin, AST, ALT, CRP and Sed Rate  Medication recommendations:   Prednisone 5mg: Take 10 mg (2 tablets) daily x1 week, then take 7.5 mg (1.5 tablets) daily x1 week, then take 5 mg (1 tablet) daily x1 week then stop.  Take with food.  Humira 40mg/0.4mL SubQ every 14 days.   # Adalimumab (Humira) Risks and Benefits: The risks and benefits of adalimumab were discussed in detail and the patient verbalized understanding.  The risks discussed include, but are not limited to, the risk for hypersensitivity, anaphylaxis, anaphylactoid reactions, an increased risk for serious infections leading to hospitalization or death, a possible increased risk for lymphoma and other malignancies, a possible worsening of demyelinating diseases, a possible worsening of heart failure, risk for cytopenias, risk for drug induced lupus, possible reactivation of hepatitis B, and possible reactivation of latent tuberculosis.  Subcutaneous injections may result in injection site reactions and/or pain at the site of injection.  The most common adverse reactions are infections, injection site reactions, headache, and rash.  It was discussed that the medication would need to be discontinued if a serious infection develops.  It was discussed that live vaccinations should not be received while using adalimumab or within 30 days prior to starting adalimumab.  I encouraged reviewing the package insert and asking any questions about the medication.          Jessica Dahl PA-C  [facility]  [facility]    Contact information: [facility]  Clinic Number:  [phone]  Please call or send a [employer] message  with any questions about your care

## 2023-08-24 ENCOUNTER — MYC MEDICAL ADVICE (OUTPATIENT)
Dept: SURGERY | Facility: CLINIC | Age: 63
End: 2023-08-24

## 2023-08-24 ENCOUNTER — TELEPHONE (OUTPATIENT)
Dept: RHEUMATOLOGY | Facility: CLINIC | Age: 63
End: 2023-08-24

## 2023-08-24 DIAGNOSIS — M06.00 SERONEGATIVE EROSIVE RHEUMATOID ARTHRITIS (H): Primary | ICD-10-CM

## 2023-08-24 LAB
HBV CORE AB SERPL QL IA: NONREACTIVE
HBV SURFACE AG SERPL QL IA: NONREACTIVE
HCV AB SERPL QL IA: NONREACTIVE

## 2023-08-24 NOTE — TELEPHONE ENCOUNTER
Benefit investigation.         Patient does not have insurance. To receive specialty medication, must apply for free drug.    Patient may apply online: [email or web address]            Debbie Salvador CPhT  Specialty Pharmacy Clinic [facility]  [email or web address]  [email or web address]  Phone: [phone]  Fax: [phone]

## 2023-08-24 NOTE — TELEPHONE ENCOUNTER
Please let patient know that I spoke with our pharmacist that works with her rheumatology medications regarding the Humira and the history of congestive heart failure.  As her ejection fraction looked good we should be okay to utilize this medication but she did state that she could talk with the patient more in detail regarding this risk.  I therefore did place a referral for her to meet/talk with the pharmacist.    Jessica Dahl PA-C on 8/24/2023 at 8:25 AM

## 2023-08-24 NOTE — TELEPHONE ENCOUNTER
FREE DRUG APPLICATION INITIATED    Medication: ADALIMUMAB *CF* 40 MG/0.4ML SC PSKT  Free Drug Program Name: [facility] [facility]  Start Date: 08/24/2023  Phone #: [phone]  Fax #: [phone]  Additional Information: sent patient free drug application + process via [employer].  Scanned blank application into media tab and sent to patient.    Follow up with patient on application status.  *NO INSURANCE*    Thank You,     Debbie Salvador [facility]  Specialty Pharmacy Clinic [facility]  [email or web address]  [email or web address]  Phone: [phone]  Fax: [phone]

## 2023-08-25 LAB
GAMMA INTERFERON BACKGROUND BLD IA-ACNC: 0.03 IU/ML
M TB IFN-G BLD-IMP: NEGATIVE
M TB IFN-G CD4+ BCKGRND COR BLD-ACNC: 9.97 IU/ML
MITOGEN IGNF BCKGRD COR BLD-ACNC: 0.28 IU/ML
MITOGEN IGNF BCKGRD COR BLD-ACNC: 0.32 IU/ML
QUANTIFERON MITOGEN: 10 IU/ML
QUANTIFERON NIL TUBE: 0.03 IU/ML
QUANTIFERON TB1 TUBE: 0.35 IU/ML
QUANTIFERON TB2 TUBE: 0.31

## 2023-08-28 NOTE — PROGRESS NOTES
Medication Therapy Management (MTM) Encounter    ASSESSMENT:                            Medication Adherence/Access: See below for considerations    Patient declined to discuss other conditions at this visit. Has another MTM visit in September.      Seronegative Erosive Rheumatoid Arthritis:  Provided education on Humira today including dosing, general administration, side effects (both common/serious), precautions, monitoring and time to efficacy. Discussed data on malignancy and risk of serious infection in depth. Discussed data on worsening heart failure and when to be seen. Encouraged indicated non-live vaccines and avoidance of live vaccines. Discussed potential need to hold therapy in the setting of signs/symptoms of active infection. Encouraged her to contact the rheumatology clinic in the event she has questions on this. Discussed patient assistance and free drug process, and patient working with liaison team to set up. Would benefit from starting Humira once it arrives and using it as directed. Educated on pain management and max daily acetaminophen dose.     Vaccines:  Per ACIP guidelines, patient is eligible for the annual influenza vaccine and a COVID booster. Discussed getting both of these vaccines in October or November.     Congestive Heart Failure/T2DM:  Serum creatinine increased ~46% since June. Patient started and escalated on Farxiga, as well as increased valsartan dose during that time. Increase likely caused by combination of medication changes. Patient would benefit from holding Farxiga and having labs rechecked in 2 weeks. Continuing on valsartan for now and will reevaluate with lab results. Patient also on metformin which should be watched closely in decreased renal function.         PLAN:                            Start Humira 40 mg subcutaneously every 14 days when received  Administration video: [email or web address]/injection  Humira Complete: Sends free sharps  containers and has more resources. https://www.[email or web address].com/humira-complete/[email or web address]?ryan=bqy_lfc_rdfi_dqgulr_infoswtz_vkbftp_lgtybbqp_tbf_wxtcaidpry_jhhak_xa-nlg-328137&&vlqcwqv=[id number]&cwfua=[id number]&gclsrc=3p.ds    Hold Farxiga and get labs in 2 weeks to recheck kidney function.   Patient to make appointment    Consider the following vaccines:  Annual influenza (flu shot)  Get in October/November  COVID booster  Get in October/November    Okay to take up to 4000 mg of acetaminophen daily (2 of the 500 mg tablets 4 times a day)    Follow-up with MTM pharmacist at appointment in September.     Follow-up: Return in 10 weeks (on 11/7/2023) for MTM pharmacist visit.    SUBJECTIVE/OBJECTIVE:                          Rocio Price is a 63 year old female called for an initial visit. She was referred to me from Jessica Dahl PA-C.      Reason for visit: [facility] Education.    Allergies/ADRs: Reviewed in chart  Past Medical History: Reviewed in chart  Tobacco: She reports that she has never smoked. She has never been exposed to tobacco smoke. She has never used smokeless tobacco.  Alcohol: Infrequent    Medication Adherence/Access: Does not have drug coverage at the moment. Application for free drug submitted by patient.      Seronegative Erosive Rheumatoid Arthritis:   Methotrexate 20 mg weekly - Not taking  Folic Acid 1 mg daily - Not taking   Prednisone course - 10 mg x7 days, 7.5 mg x7 days, 5 mg x7 days, then stop  Acetaminophen 500 mg - 3 tablets 2-3 times per day     Patient reported only being on prednisone and acetaminophen to control inflammation and pain. Tried methotrexate in the past but did not notice significant benefit. Mentioned prednisone has helped a little with pain, but not much for swelling. Currently taking acetaminophen 2-3 times daily which helps a little. Reported not having drug coverage at this time and that she sent in the completed application  for free drug.     Reviewed baseline pre-biologic screening.   Hep C antibody non-reactive  Hep B surface antibody not completed  Hep B surface antigen non-reactive  Hep B core antibody non-reactive  Quantiferon TB Negative  HIV antigen not assessed    Vaccines:  Due for annual flu and COVID booster. Patient displayed understanding and agreement.     Congestive Heart Failure/T2DM:  Aspirin 81 mg daily   Atorvastatin 40 mg daily   Furosemide 20 mg twice daily   Metoprolol XL 50 mg daily   Valsartan 160 mg daily   Farxiga 10 mg daily   Insulin aspart 6 units three times daily   Insulin glargine 40 units at bedtime   Metformin 1000 mg twice daily     Patient reported starting on Farxiga 5 mg daily in June and increased to 10 mg daily in July. She also reported increasing from valsartan 80 mg daily to 160 mg daily in July. Patient agreed to hold dose and plans to follow-up at [facility] appointment in September.      Creatinine   Date Value Ref Range Status   08/23/2023 1.08 (H) 0.51 - 0.95 mg/dL Final      BP Readings from Last 3 Encounters:   08/23/23 (!) 151/84   07/03/23 (!) 150/64   06/15/23 (!) 162/82      Hemoglobin A1C   Date Value Ref Range Status   06/15/2023 9.9 (H) 0.0 - 5.6 % Final     Comment:     Normal <5.7%   Prediabetes 5.7-6.4%    Diabetes 6.5% or higher     Note: Adopted from ADA consensus guidelines.     Today's Vitals: LMP  (LMP Unknown)   ----------------    I spent 35 minutes with this patient today. All changes were made via collaborative practice agreement with Jessica Dahl. A copy of the visit note was provided to the patient's provider(s).    A summary of these recommendations was sent via [employer].    Alden Villa, Malathi  Medication Therapy Management Pharmacist  [facility]  Phone: [phone]    Telemedicine Visit Details  Type of service:  Telephone visit  Start Time:  9:00 AM  End Time: 9:35 AM     Medication Therapy Recommendations  Rheumatoid arthritis (H)     Current Medication: acetaminophen (TYLENOL) 500 MG tablet   Rationale: Dose too high - Dosage too high - Safety   Recommendation: Decrease Dose - Recommended MAX of 4000 mg daily   Status: Patient Agreed - Adherence/Education          Current Medication: adalimumab (HUMIRA *CF*) 40 MG/0.4ML pen kit   Rationale: Untreated condition - Needs additional medication therapy - Indication   Recommendation: Start Medication - Start Humira once received   Status: Accepted - no CPA Needed         Type 2 diabetes mellitus with hyperglycemia, with long-term current use of insulin (H)    Current Medication: dapagliflozin (FARXIGA) 10 MG TABS tablet   Rationale: Medication requires monitoring - Needs additional monitoring   Recommendation: Order Lab - Hold Farxiga and recheck kidney function in 2 weeks   Status: Accepted per CPA         Vaccine counseling    Rationale: Preventive therapy - Needs additional medication therapy - Indication   Recommendation: Start Medication - COVID-19 ADENOVIRUS VACCINE - Get vaccines in Oct/Nov   Status: Patient Agreed - Adherence/Education

## 2023-08-29 ENCOUNTER — VIRTUAL VISIT (OUTPATIENT)
Dept: RHEUMATOLOGY | Facility: CLINIC | Age: 63
End: 2023-08-29
Attending: PHYSICIAN ASSISTANT

## 2023-08-29 DIAGNOSIS — Z71.85 VACCINE COUNSELING: ICD-10-CM

## 2023-08-29 DIAGNOSIS — E11.65 TYPE 2 DIABETES MELLITUS WITH HYPERGLYCEMIA, WITH LONG-TERM CURRENT USE OF INSULIN (H): Primary | ICD-10-CM

## 2023-08-29 DIAGNOSIS — Z79.4 TYPE 2 DIABETES MELLITUS WITH HYPERGLYCEMIA, WITH LONG-TERM CURRENT USE OF INSULIN (H): Primary | ICD-10-CM

## 2023-08-29 DIAGNOSIS — M06.9 RHEUMATOID ARTHRITIS (H): ICD-10-CM

## 2023-08-29 DIAGNOSIS — I50.9 CONGESTIVE HEART FAILURE (H): ICD-10-CM

## 2023-08-29 NOTE — PATIENT INSTRUCTIONS
"Recommendations from today's MTM visit:                                                    MTM (medication therapy management) is a service provided by a clinical pharmacist designed to help you get the most of out of your medicines.      Start Humira 40 mg subcutaneously every 14 days when received  Administration video: [email or web address]  Humira Complete: Sends free sharps containers and has more resources. https://www.[email or web address]/[email or web address]-complete/sign-up?ryan=kre_yuv_xcjm_pnqikj_fzjpowbp_bxrjud_ffqjuqpu_clq_niypxbroek_ivcbe_ot-std-233334&&jlgqijt=[id number]&bmisy=[id number]&gclsrc=3p.ds    Hold Farxiga and get labs in 2 weeks to recheck kidney function.  Patient to make appointment.     Consider the following vaccines:  Annual influenza (flu shot)  Get in October/November  COVID booster  Get in October/November    Okay to take up to 4000 mg of acetaminophen daily (2 of the 500 mg tablets 4 times a day)    Follow-up with MTM pharmacist at appointment in September.     Follow-up: Return in 20 days (on 9/18/2023) for MTM pharmacist visit. With Laura Ferris PharmD.    It was great speaking with you today.  I value your experience and would be very thankful for your time in providing feedback in our clinic survey. In the next few days, you may receive an email or text message from [employer] with a link to a survey related to your  clinical pharmacist.\"     To schedule another MTM appointment, please call the clinic directly or you may call the MTM scheduling line at [phone] or toll-free at [phone].     My Clinical Pharmacist's contact information:                                                      Please feel free to contact me with any questions or concerns you have.      Alden Villa PharmD  Medication Therapy Management Pharmacist  [facility]  Phone: [phone]   "

## 2023-08-29 NOTE — LETTER
8/29/2023       RE: Rocio Price  [address] [facility] [facility] [address]     Dear Colleague,    Thank you for referring your patient, Rocio Price, to the [facility] [place] at [facility]. Please see a copy of my visit note below.    Medication Therapy Management (MTM) Encounter    ASSESSMENT:                            Medication Adherence/Access: See below for considerations    Patient declined to discuss other conditions at this visit. Has another MTM visit in September.      Seronegative Erosive Rheumatoid Arthritis:  Provided education on Humira today including dosing, general administration, side effects (both common/serious), precautions, monitoring and time to efficacy. Discussed data on malignancy and risk of serious infection in depth. Discussed data on worsening heart failure and when to be seen. Encouraged indicated non-live vaccines and avoidance of live vaccines. Discussed potential need to hold therapy in the setting of signs/symptoms of active infection. Encouraged her to contact the rheumatology clinic in the event she has questions on this. Discussed patient assistance and free drug process, and patient working with liaison team to set up. Would benefit from starting Humira once it arrives and using it as directed. Educated on pain management and max daily acetaminophen dose.     Vaccines:  Per ACIP guidelines, patient is eligible for the annual influenza vaccine and a COVID booster. Discussed getting both of these vaccines in October or November.     Congestive Heart Failure/T2DM:  Serum creatinine increased ~46% since June. Patient started and escalated on Farxiga, as well as increased valsartan dose during that time. Increase likely caused by combination of medication changes. Patient would benefit from holding Farxiga and having labs rechecked in 2 weeks. Continuing on valsartan for now and will reevaluate  with lab results. Patient also on metformin which should be watched closely in decreased renal function.         PLAN:                            Start Humira 40 mg subcutaneously every 14 days when received  Administration video: [email or web address]  Humira Complete: Sends free sharps containers and has more resources. https://www.[email or web address]-complete/sign-up?ryan=lcm_qfs_aohr_ymzoir_healfody_cbdqvn_coyhgdge_vqr_pmhpevbpga_jzihy_vs-vin-875858&&rybvfob=[id number]&yzxsc=[id number]&gclsrc=3p.ds    Hold Farxiga and get labs in 2 weeks to recheck kidney function.   Patient to make appointment    Consider the following vaccines:  Annual influenza (flu shot)  Get in October/November  COVID booster  Get in October/November    Okay to take up to 4000 mg of acetaminophen daily (2 of the 500 mg tablets 4 times a day)    Follow-up with MTM pharmacist at appointment in September.     Follow-up: Return in 10 weeks (on 11/7/2023) for MTM pharmacist visit.    SUBJECTIVE/OBJECTIVE:                          Rocio Price is a 63 year old female called for an initial visit. She was referred to me from Jessica Dahl PA-C.      Reason for visit: Humira Education.    Allergies/ADRs: Reviewed in chart  Past Medical History: Reviewed in chart  Tobacco: She reports that she has never smoked. She has never been exposed to tobacco smoke. She has never used smokeless tobacco.  Alcohol: Infrequent    Medication Adherence/Access: Does not have drug coverage at the moment. Application for free drug submitted by patient.      Seronegative Erosive Rheumatoid Arthritis:   Methotrexate 20 mg weekly - Not taking  Folic Acid 1 mg daily - Not taking   Prednisone course - 10 mg x7 days, 7.5 mg x7 days, 5 mg x7 days, then stop  Acetaminophen 500 mg - 3 tablets 2-3 times per day     Patient reported only being on prednisone and acetaminophen to control inflammation and  pain. Tried methotrexate in the past but did not notice significant benefit. Mentioned prednisone has helped a little with pain, but not much for swelling. Currently taking acetaminophen 2-3 times daily which helps a little. Reported not having drug coverage at this time and that she sent in the completed application for free drug.     Reviewed baseline pre-biologic screening.   Hep C antibody non-reactive  Hep B surface antibody not completed  Hep B surface antigen non-reactive  Hep B core antibody non-reactive  Quantiferon TB Negative  HIV antigen not assessed    Vaccines:  Due for annual flu and COVID booster. Patient displayed understanding and agreement.     Congestive Heart Failure/T2DM:  Aspirin 81 mg daily   Atorvastatin 40 mg daily   Furosemide 20 mg twice daily   Metoprolol XL 50 mg daily   Valsartan 160 mg daily   Farxiga 10 mg daily   Insulin aspart 6 units three times daily   Insulin glargine 40 units at bedtime   Metformin 1000 mg twice daily     Patient reported starting on Farxiga 5 mg daily in June and increased to 10 mg daily in July. She also reported increasing from valsartan 80 mg daily to 160 mg daily in July. Patient agreed to hold dose and plans to follow-up at [facility] appointment in September.      Creatinine   Date Value Ref Range Status   08/23/2023 1.08 (H) 0.51 - 0.95 mg/dL Final      BP Readings from Last 3 Encounters:   08/23/23 (!) 151/84   07/03/23 (!) 150/64   06/15/23 (!) 162/82      Hemoglobin A1C   Date Value Ref Range Status   06/15/2023 9.9 (H) 0.0 - 5.6 % Final     Comment:     Normal <5.7%   Prediabetes 5.7-6.4%    Diabetes 6.5% or higher     Note: Adopted from ADA consensus guidelines.     Today's Vitals: LMP  (LMP Unknown)   ----------------    I spent 35 minutes with this patient today. All changes were made via collaborative practice agreement with Jessica Dahl. A copy of the visit note was provided to the patient's provider(s).    A summary of these recommendations was  sent via [employer].    Alden Villa, PharmD  Medication Therapy Management Pharmacist  [facility]  Phone: [phone]    Telemedicine Visit Details  Type of service:  Telephone visit  Start Time:  9:00 AM  End Time: 9:35 AM     Medication Therapy Recommendations  Rheumatoid arthritis (H)    Current Medication: acetaminophen (TYLENOL) 500 MG tablet   Rationale: Dose too high - Dosage too high - Safety   Recommendation: Decrease Dose - Recommended MAX of 4000 mg daily   Status: Patient Agreed - Adherence/Education          Current Medication: adalimumab (HUMIRA *CF*) 40 MG/0.4ML pen kit   Rationale: Untreated condition - Needs additional medication therapy - Indication   Recommendation: Start Medication - Start Humira once received   Status: Accepted - no CPA Needed         Type 2 diabetes mellitus with hyperglycemia, with long-term current use of insulin (H)    Current Medication: dapagliflozin (FARXIGA) 10 MG TABS tablet   Rationale: Medication requires monitoring - Needs additional monitoring   Recommendation: Order Lab - Hold Farxiga and recheck kidney function in 2 weeks   Status: Accepted per CPA         Vaccine counseling    Rationale: Preventive therapy - Needs additional medication therapy - Indication   Recommendation: Start Medication - COVID-19 ADENOVIRUS VACCINE - Get vaccines in Oct/Nov   Status: Patient Agreed - Adherence/Education

## 2023-08-30 ENCOUNTER — MEDICAL CORRESPONDENCE (OUTPATIENT)
Dept: HEALTH INFORMATION MANAGEMENT | Facility: CLINIC | Age: 63
End: 2023-08-30

## 2023-08-30 RX ORDER — ACETAMINOPHEN 500 MG
500-1000 TABLET ORAL EVERY 6 HOURS PRN
COMMUNITY

## 2023-08-30 NOTE — TELEPHONE ENCOUNTER
Emailed provider portion for signature.    Called and spoke to patient.     Went over application process.    Emailed patient their application to sign and return.    Thank You,     Debbie Salvador, [facility]  Specialty Pharmacy Clinic Liaison [facility] Specialty  [email or web address]  [email or web address]  Phone: [phone]  Fax: [phone]

## 2023-08-31 NOTE — TELEPHONE ENCOUNTER
FREE DRUG APPLICATION INITIATED    Medication: ADALIMUMAB *CF* 40 MG/0.4ML SC PSKT  Free Drug Program Name: [employer]  Start Date: 08/30/2023  Phone #:[phone]  Fax #: [phone]  Additional Information: no insurance    Faxed pt portion + prescriber portion + med list + [place] card to [employer]     Follow up with patient on application status - [email or web address] income eligibility    Thank You,     Debbie Salvador [facility]  Specialty Pharmacy Clinic [facility] Specialty  [email or web address]  [email or web address]  Phone: [phone]  Fax: [phone]

## 2023-09-06 NOTE — TELEPHONE ENCOUNTER
Called for status [phone]    FREE DRUG PENDING    Thank You,     Debbie Salvador [facility]  Specialty Pharmacy Clinic [facility] Specialty  [email or web address]  [email or web address]  Phone: [phone]  Fax: [phone]

## 2023-09-08 NOTE — TELEPHONE ENCOUNTER
FREE DRUG APPLICATION APPROVED    Medication: ADALIMUMAB *CF* 40 MG/0.4ML SC PSKT  Program Name: [employer]  Effective Date: 9/8/2023  Expiration Date: 9/8/2024  Pharmacy Filling the Rx: [facility] - [address]  Patient Notified: yes - phone  Additional Information: sacnned approval into media tab - Humira PAP approval         Thank You,     Debbie Salvador, [facility]  Specialty Pharmacy Clinic [facility] Specialty  [email or web address]  [email or web address]  Phone: [phone]  Fax: [phone]

## 2023-09-14 ENCOUNTER — MYC REFILL (OUTPATIENT)
Dept: FAMILY MEDICINE | Facility: CLINIC | Age: 63
End: 2023-09-14
Payer: COMMERCIAL

## 2023-09-14 DIAGNOSIS — J45.40 MODERATE PERSISTENT ASTHMA WITHOUT COMPLICATION: Primary | ICD-10-CM

## 2023-09-14 RX ORDER — MONTELUKAST SODIUM 10 MG/1
10 TABLET ORAL AT BEDTIME
Qty: 90 TABLET | Refills: 0 | Status: SHIPPED | OUTPATIENT
Start: 2023-09-14 | End: 2024-07-23

## 2023-09-14 NOTE — TELEPHONE ENCOUNTER
Routing refill request to provider for review/approval because:  Medication is reported/historical  Ayah West RN, BSN  [facility]

## 2023-09-25 ENCOUNTER — LAB (OUTPATIENT)
Dept: LAB | Facility: CLINIC | Age: 63
End: 2023-09-25
Payer: COMMERCIAL

## 2023-09-25 DIAGNOSIS — E11.65 TYPE 2 DIABETES MELLITUS WITH HYPERGLYCEMIA, WITH LONG-TERM CURRENT USE OF INSULIN (H): ICD-10-CM

## 2023-09-25 DIAGNOSIS — Z79.4 TYPE 2 DIABETES MELLITUS WITH HYPERGLYCEMIA, WITH LONG-TERM CURRENT USE OF INSULIN (H): ICD-10-CM

## 2023-09-25 LAB
ANION GAP SERPL CALCULATED.3IONS-SCNC: 15 MMOL/L (ref 7–15)
BUN SERPL-MCNC: 28.3 MG/DL (ref 8–23)
CALCIUM SERPL-MCNC: 10.1 MG/DL (ref 8.8–10.2)
CHLORIDE SERPL-SCNC: 98 MMOL/L (ref 98–107)
CREAT SERPL-MCNC: 1.09 MG/DL (ref 0.51–0.95)
DEPRECATED HCO3 PLAS-SCNC: 27 MMOL/L (ref 22–29)
EGFRCR SERPLBLD CKD-EPI 2021: 57 ML/MIN/1.73M2
GLUCOSE SERPL-MCNC: 238 MG/DL (ref 70–99)
POTASSIUM SERPL-SCNC: 3.8 MMOL/L (ref 3.4–5.3)
SODIUM SERPL-SCNC: 140 MMOL/L (ref 136–145)

## 2023-09-25 PROCEDURE — 80048 BASIC METABOLIC PNL TOTAL CA: CPT

## 2023-09-25 PROCEDURE — 36415 COLL VENOUS BLD VENIPUNCTURE: CPT

## 2023-09-28 ENCOUNTER — MYC MEDICAL ADVICE (OUTPATIENT)
Dept: RHEUMATOLOGY | Facility: CLINIC | Age: 63
End: 2023-09-28
Payer: COMMERCIAL

## 2023-09-28 NOTE — TELEPHONE ENCOUNTER
I was able to get a hold of patient to schedule her for an appointment in a few weeks. She will work on increased water intake. Confirmed she not on any NSAIDs. She does admit more frequent bowel movement in the morning but not persistent. She reports the valsartan has helped her blood pressure, her home blood pressure have been in the 130/80's. Patient would like to come in to have labs rescheduled vs come off valsartan.    Lorraine Sky, PharmD  Medication Therapy Management Pharmacist  [phone]

## 2023-09-28 NOTE — TELEPHONE ENCOUNTER
Attempted to call patient but no answer. Sent patient mychart to follow-up in clinic.    Recommend we also get patient in to a MTM follow-up in clinic. Scr remains elevate despite holding Farxiga. May consider reducing valsartan if no other cause identified. We need to start amlodipine for blood pressure control.    Lorraine Sky, PharmD  Medication Therapy Management Pharmacist  [phone]

## 2023-10-03 ENCOUNTER — PATIENT OUTREACH (OUTPATIENT)
Dept: FAMILY MEDICINE | Facility: CLINIC | Age: 63
End: 2023-10-03
Payer: COMMERCIAL

## 2023-10-03 NOTE — TELEPHONE ENCOUNTER
"\"Can we help facilitate her follow up with cardiology. She has yet to schedule this.   Isaac harris, pac\"    Called and spoke with pt,     -Writer asked pt if I could be of assistance with scheduling with cardiology.  -Pt requests that writer send the information to her via Gigg[employer] and then she will call to schedule the appointment.     -Sent pt a [employer] message.     Filomena BOJORQUEZ RN   PAL (Patient Advocate Liaison)  [facility]  [phone]    "

## 2023-10-10 ENCOUNTER — OFFICE VISIT (OUTPATIENT)
Dept: CARDIOLOGY | Facility: CLINIC | Age: 63
End: 2023-10-10
Attending: PHYSICIAN ASSISTANT
Payer: COMMERCIAL

## 2023-10-10 VITALS
WEIGHT: 255.6 LBS | HEART RATE: 60 BPM | BODY MASS INDEX: 48.3 KG/M2 | DIASTOLIC BLOOD PRESSURE: 72 MMHG | SYSTOLIC BLOOD PRESSURE: 132 MMHG

## 2023-10-10 DIAGNOSIS — I25.10 CORONARY ARTERY DISEASE INVOLVING NATIVE CORONARY ARTERY OF NATIVE HEART WITHOUT ANGINA PECTORIS: Primary | ICD-10-CM

## 2023-10-10 DIAGNOSIS — I50.9 CONGESTIVE HEART FAILURE, UNSPECIFIED HF CHRONICITY, UNSPECIFIED HEART FAILURE TYPE (H): ICD-10-CM

## 2023-10-10 PROCEDURE — 99205 OFFICE O/P NEW HI 60 MIN: CPT | Performed by: INTERNAL MEDICINE

## 2023-10-10 PROCEDURE — 93000 ELECTROCARDIOGRAM COMPLETE: CPT | Performed by: INTERNAL MEDICINE

## 2023-10-10 NOTE — PROGRESS NOTES
HPI and Plan:   Ms Price is a very pleasant 63-year-old female with history of congestive heart failure with preserved ejection fraction, coronary disease with proximal LAD moderate stenosis on coronary CT angiogram last year, hypertension, dyslipidemia, diabetes, obesity.  Patient has been getting her care at [facility] she lives in [place].  She is shifting her care here.  Patient tells me overall health wise she feels well.  Symptoms intermittently she can get chest discomfort lasting for a few seconds to a few minutes nonexertional in nature.  She is on baby aspirin Lipitor 40 mg daily, valsartan, metoprolol, Lasix.  Her weights are stable.  Patient tells me at home her blood pressure is quite well controlled she does tend to have elevated blood pressure in the clinic and today also her initial systolic blood pressure was slightly elevated and it came down on repeat check.  EKG shows sinus bradycardia with some nonspecific T wave inversion in lead aVL.  Echocardiac last year showed mildly enlarged left ventricle with normal LV systolic function with normal RV systolic function with pulm hypertension with RVSP of 48 mm hg+ RA, trivial aortic regurgitation tiny posterior pericardial effusion.  She does not use any alcohol or tobacco.      Assessment plan  Coronary disease on the basis of coronary artery CT last year showing moderate proximal LAD disease.  On aspirin, high intensity statin.  LDL well controlled at 55.  Chest discomfort appears quite atypical.  Patient will be unable to exercise because of arthritis and back issue.  Recommend cardiac MRI stress perfusion.  Educated patient not to consume any caffeine for 12 hours before the stress test.  History of preserved ejection fraction congestive heart failure.  It does not look like patient has required hospitalization in the past.  On Lasix.  Appears euvolemic.  Hypertension with some element of whitecoat hypertension  Diabetes hemoglobin A1c of  9.9.    Recommendations  Continue aspirin, Lipitor, valsartan, metoprolol  Echocardiogram  Cardiac MRI with stress perfusion  Follow-up in 3 to 4 months.      Today's clinic visit entailed:  Review of the result(s) of each unique test - review of care everywhere with review of echocardiogram, coronary CT angiogram, EKG today, lipid panel done recently  The level of medical decision making during this visit was of high complexity.  Provider  Link to [employer] Help Grid           Orders Placed This Encounter   Procedures    Follow-Up with Cardiology    EKG 12-lead complete w/read - Clinics (performed today)    Echocardiogram Complete       No orders of the defined types were placed in this encounter.      There are no discontinued medications.      Encounter Diagnoses   Name Primary?    Congestive heart failure, unspecified HF chronicity, unspecified heart failure type (H)     Coronary artery disease involving native coronary artery of native heart without angina pectoris Yes       CURRENT MEDICATIONS:  Current Outpatient Medications   Medication Sig Dispense Refill    acetaminophen (TYLENOL) 500 MG tablet Take 500-1,000 mg by mouth every 6 hours as needed for mild pain      adalimumab (HUMIRA *CF*) 40 MG/0.4ML pen kit Inject 0.4 mLs (40 mg) Subcutaneous every 14 days Hold for signs of infection, then seek medical attention. 1 each 5    albuterol (PROAIR HFA/PROVENTIL HFA/VENTOLIN HFA) 108 (90 Base) MCG/ACT inhaler Inhale 2 puffs into the lungs every 6 hours as needed for shortness of breath, wheezing or cough 18 g 0    aspirin (ASA) 81 MG EC tablet Take 1 tablet (81 mg) by mouth daily 90 tablet 3    atorvastatin (LIPITOR) 40 MG tablet Take 1 tablet (40 mg) by mouth daily 90 tablet 1    blood glucose (PRECISION QID TEST) test strip       buPROPion (WELLBUTRIN SR) 150 MG 12 hr tablet Take 1 tablet (150 mg) by mouth daily 90 tablet 3    citalopram (CELEXA) 20 MG tablet Take 1 tablet (20 mg) by mouth daily 90 tablet 1     "clobetasol (TEMOVATE) 0.05 % external ointment Apply topically 2 times daily as needed      Continuous Blood Gluc Sensor (FREESTYLE REYNOLD 3 SENSOR) MISC 1 each every 14 days      dapagliflozin (FARXIGA) 10 MG TABS tablet Take 1 tablet (10 mg) by mouth daily 90 tablet 1    dicyclomine (BENTYL) 10 MG capsule TAKE 1 CAPSULE BY MOUTH FOUR TIMES A DAY AS NEEDED FOR ABDOMINAL PAIN OR CRAMPS      furosemide (LASIX) 20 MG tablet Take 1 tablet (20 mg) by mouth 2 times daily 180 tablet 1    gabapentin (NEURONTIN) 300 MG capsule Take 1 capsule (300 mg) by mouth 3 times daily 270 capsule 1    insulin aspart (NOVOLOG PEN) 100 UNIT/ML pen Inject 6 Units Subcutaneous 3 times daily (with meals) 15 mL 4    insulin glargine (LANTUS SOLOSTAR) 100 UNIT/ML pen Inject 40 Units Subcutaneous At Bedtime 15 mL 3    insulin syringe-needle U-100 (30G X 1/2\" 0.3 ML) 30G X 1/2\" 0.3 ML miscellaneous Use 4 syringes daily or as directed. For insulin. 360 each 11    insulin syringe-needle U-100 (30G X 1/2\" 0.3 ML) 30G X 1/2\" 0.3 ML miscellaneous Use 1 syringes weekly or as directed.for methotrexate 12 each 11    ipratropium - albuterol 0.5 mg/2.5 mg/3 mL (DUONEB) 0.5-2.5 (3) MG/3ML neb solution Inhale 1 vial (3 mLs) into the lungs every 6 hours as needed for shortness of breath, wheezing or cough 90 mL 3    loratadine-pseudoePHEDrine (CLARITIN-D 12-HOUR) 5-120 MG 12 hr tablet Take 1 tablet by mouth daily      melatonin 5 MG tablet Take 5 mg by mouth At Bedtime      metFORMIN (GLUCOPHAGE) 1000 MG tablet Take 1 tablet (1,000 mg) by mouth 2 times daily (with meals) 180 tablet 3    metoprolol succinate ER (TOPROL XL) 50 MG 24 hr tablet Take 1 tablet (50 mg) by mouth At Bedtime 90 tablet 1    montelukast (SINGULAIR) 10 MG tablet Take 1 tablet (10 mg) by mouth At Bedtime 90 tablet 0    pantoprazole (PROTONIX) 40 MG EC tablet Take 1 tablet (40 mg) by mouth daily 90 tablet 1    pramipexole (MIRAPEX) 0.125 MG tablet Take 2 tabs in afternoon and two tabs in "  tablet 1    sodium chloride (NEBUSAL) 3 % neb solution Inhale 4 mLs into the lungs      valsartan (DIOVAN) 160 MG tablet Take 1 tablet (160 mg) by mouth daily 90 tablet 0    folic acid (FOLVITE) 1 MG tablet Take 1 mg by mouth (Patient not taking: Reported on 7/3/2023)      glucose-vitamin C 4-6 GM-MG CHEW Take 4 g by mouth (Patient not taking: Reported on 10/10/2023)      Methotrexate, Anti-Rheumatic, (RASUVO) 20 MG/0.4ML pen Inject 0.4 mLs (20 mg) Subcutaneous every 7 days for 90 days (Patient not taking: Reported on 8/23/2023) 4.8 mL 0    predniSONE (DELTASONE) 5 MG tablet Take 1 tablet (5 mg) by mouth daily Take 10 mg (2 tablets) daily x1 week, then take 7.5 mg (1.5 tablets) daily x1 week, then take 5 mg (1 tablet) daily x1 week then stop.  Take with food. (Patient not taking: Reported on 10/10/2023) 32 tablet 0       ALLERGIES     Allergies   Allergen Reactions    Seasonal Allergies Other (See Comments)     Itchy watery eyes       PAST MEDICAL HISTORY:  No past medical history on file.    PAST SURGICAL HISTORY:  Past Surgical History:   Procedure Laterality Date    HYSTERECTOMY, PAP NO LONGER INDICATED      HYSTERECTOMY, PAP NO LONGER INDICATED      MASTECTOMY, BILATERAL         FAMILY HISTORY:  History reviewed. No pertinent family history.    SOCIAL HISTORY:  Social History     Socioeconomic History    Marital status:      Spouse name: None    Number of children: None    Years of education: None    Highest education level: None   Tobacco Use    Smoking status: Never     Passive exposure: Never    Smokeless tobacco: Never   Substance and Sexual Activity    Alcohol use: Not Currently    Drug use: Never       Review of Systems:  Skin:  not assessed       Eyes:  not assessed      ENT:  not assessed      Respiratory:  Positive for shortness of breath;cough;sleep apnea     Cardiovascular:    Positive for;lightheadedness;edema swelling/soreness in right hand and arm; edema of the  ankles  Gastroenterology: not assessed      Genitourinary:  not assessed      Musculoskeletal:  not assessed      Neurologic:  Positive for numbness or tingling of hands numbness in both arms  Psychiatric:  not assessed      Heme/Lymph/Imm:  not assessed      Endocrine:  not assessed        Physical Exam:  Vitals: /72   Pulse 60   Wt 115.9 kg (255 lb 9.6 oz)   LMP  (LMP Unknown)   BMI 48.30 kg/m      General patient appears comfortable  Neck normal JVP, negative hepatojugular reflux  Cardiovascular system S1-S2 normal no murmur rub or gallop  Respiratory system clear to auscultation   Extremities no edema  Neurological alert, oriented    CC  Behzad Correa PA-C  [address] [place] [address]

## 2023-10-10 NOTE — LETTER
10/10/2023    Behzad Correa PA-C  [id number]  [facility] [address]    RE: Rocio Price       Dear Colleague,     I had the pleasure of seeing Rocio Price in the [facility].  HPI and Plan:   Ms Price is a very pleasant 63-year-old female with history of congestive heart failure with preserved ejection fraction, coronary disease with proximal LAD moderate stenosis on coronary CT angiogram last year, hypertension, dyslipidemia, diabetes, obesity.  Patient has been getting her care at [facility] she lives in [place].  She is shifting her care here.  Patient tells me overall health wise she feels well.  Symptoms intermittently she can get chest discomfort lasting for a few seconds to a few minutes nonexertional in nature.  She is on baby aspirin Lipitor 40 mg daily, valsartan, metoprolol, Lasix.  Her weights are stable.  Patient tells me at home her blood pressure is quite well controlled she does tend to have elevated blood pressure in the clinic and today also her initial systolic blood pressure was slightly elevated and it came down on repeat check.  EKG shows sinus bradycardia with some nonspecific T wave inversion in lead aVL.  Echocardiac last year showed mildly enlarged left ventricle with normal LV systolic function with normal RV systolic function with pulm hypertension with RVSP of 48 mm hg+ RA, trivial aortic regurgitation tiny posterior pericardial effusion.  She does not use any alcohol or tobacco.      Assessment plan  Coronary disease on the basis of coronary artery CT last year showing moderate proximal LAD disease.  On aspirin, high intensity statin.  LDL well controlled at 55.  Chest discomfort appears quite atypical.  Patient will be unable to exercise because of arthritis and back issue.  Recommend cardiac MRI stress perfusion.  Educated patient not to consume any caffeine for 12 hours before the stress test.  History of preserved ejection fraction  congestive heart failure.  It does not look like patient has required hospitalization in the past.  On Lasix.  Appears euvolemic.  Hypertension with some element of whitecoat hypertension  Diabetes hemoglobin A1c of 9.9.    Recommendations  Continue aspirin, Lipitor, valsartan, metoprolol  Echocardiogram  Cardiac MRI with stress perfusion  Follow-up in 3 to 4 months.      Today's clinic visit entailed:  Review of the result(s) of each unique test - review of care everywhere with review of echocardiogram, coronary CT angiogram, EKG today, lipid panel done recently  The level of medical decision making during this visit was of high complexity.  Provider  Link to [facility] Help Grid           Orders Placed This Encounter   Procedures    Follow-Up with Cardiology    EKG 12-lead complete w/read - Clinics (performed today)    Echocardiogram Complete       No orders of the defined types were placed in this encounter.      There are no discontinued medications.      Encounter Diagnoses   Name Primary?    Congestive heart failure, unspecified HF chronicity, unspecified heart failure type (H)     Coronary artery disease involving native coronary artery of native heart without angina pectoris Yes       CURRENT MEDICATIONS:  Current Outpatient Medications   Medication Sig Dispense Refill    acetaminophen (TYLENOL) 500 MG tablet Take 500-1,000 mg by mouth every 6 hours as needed for mild pain      adalimumab (HUMIRA *CF*) 40 MG/0.4ML pen kit Inject 0.4 mLs (40 mg) Subcutaneous every 14 days Hold for signs of infection, then seek medical attention. 1 each 5    albuterol (PROAIR HFA/PROVENTIL HFA/VENTOLIN HFA) 108 (90 Base) MCG/ACT inhaler Inhale 2 puffs into the lungs every 6 hours as needed for shortness of breath, wheezing or cough 18 g 0    aspirin (ASA) 81 MG EC tablet Take 1 tablet (81 mg) by mouth daily 90 tablet 3    atorvastatin (LIPITOR) 40 MG tablet Take 1 tablet (40 mg) by mouth daily 90 tablet 1    blood glucose (PRECISION  "QID TEST) test strip       buPROPion (WELLBUTRIN SR) 150 MG 12 hr tablet Take 1 tablet (150 mg) by mouth daily 90 tablet 3    citalopram (CELEXA) 20 MG tablet Take 1 tablet (20 mg) by mouth daily 90 tablet 1    clobetasol (TEMOVATE) 0.05 % external ointment Apply topically 2 times daily as needed      Continuous Blood Gluc Sensor (FREESTYLE REYNOLD 3 SENSOR) MISC 1 each every 14 days      dapagliflozin (FARXIGA) 10 MG TABS tablet Take 1 tablet (10 mg) by mouth daily 90 tablet 1    dicyclomine (BENTYL) 10 MG capsule TAKE 1 CAPSULE BY MOUTH FOUR TIMES A DAY AS NEEDED FOR ABDOMINAL PAIN OR CRAMPS      furosemide (LASIX) 20 MG tablet Take 1 tablet (20 mg) by mouth 2 times daily 180 tablet 1    gabapentin (NEURONTIN) 300 MG capsule Take 1 capsule (300 mg) by mouth 3 times daily 270 capsule 1    insulin aspart (NOVOLOG PEN) 100 UNIT/ML pen Inject 6 Units Subcutaneous 3 times daily (with meals) 15 mL 4    insulin glargine (LANTUS SOLOSTAR) 100 UNIT/ML pen Inject 40 Units Subcutaneous At Bedtime 15 mL 3    insulin syringe-needle U-100 (30G X 1/2\" 0.3 ML) 30G X 1/2\" 0.3 ML miscellaneous Use 4 syringes daily or as directed. For insulin. 360 each 11    insulin syringe-needle U-100 (30G X 1/2\" 0.3 ML) 30G X 1/2\" 0.3 ML miscellaneous Use 1 syringes weekly or as directed.for methotrexate 12 each 11    ipratropium - albuterol 0.5 mg/2.5 mg/3 mL (DUONEB) 0.5-2.5 (3) MG/3ML neb solution Inhale 1 vial (3 mLs) into the lungs every 6 hours as needed for shortness of breath, wheezing or cough 90 mL 3    loratadine-pseudoePHEDrine (CLARITIN-D 12-HOUR) 5-120 MG 12 hr tablet Take 1 tablet by mouth daily      melatonin 5 MG tablet Take 5 mg by mouth At Bedtime      metFORMIN (GLUCOPHAGE) 1000 MG tablet Take 1 tablet (1,000 mg) by mouth 2 times daily (with meals) 180 tablet 3    metoprolol succinate ER (TOPROL XL) 50 MG 24 hr tablet Take 1 tablet (50 mg) by mouth At Bedtime 90 tablet 1    montelukast (SINGULAIR) 10 MG tablet Take 1 tablet (10 " mg) by mouth At Bedtime 90 tablet 0    pantoprazole (PROTONIX) 40 MG EC tablet Take 1 tablet (40 mg) by mouth daily 90 tablet 1    pramipexole (MIRAPEX) 0.125 MG tablet Take 2 tabs in afternoon and two tabs in  tablet 1    sodium chloride (NEBUSAL) 3 % neb solution Inhale 4 mLs into the lungs      valsartan (DIOVAN) 160 MG tablet Take 1 tablet (160 mg) by mouth daily 90 tablet 0    folic acid (FOLVITE) 1 MG tablet Take 1 mg by mouth (Patient not taking: Reported on 7/3/2023)      glucose-vitamin C 4-6 GM-MG CHEW Take 4 g by mouth (Patient not taking: Reported on 10/10/2023)      Methotrexate, Anti-Rheumatic, (RASUVO) 20 MG/0.4ML pen Inject 0.4 mLs (20 mg) Subcutaneous every 7 days for 90 days (Patient not taking: Reported on 8/23/2023) 4.8 mL 0    predniSONE (DELTASONE) 5 MG tablet Take 1 tablet (5 mg) by mouth daily Take 10 mg (2 tablets) daily x1 week, then take 7.5 mg (1.5 tablets) daily x1 week, then take 5 mg (1 tablet) daily x1 week then stop.  Take with food. (Patient not taking: Reported on 10/10/2023) 32 tablet 0       ALLERGIES     Allergies   Allergen Reactions    Seasonal Allergies Other (See Comments)     Itchy watery eyes       PAST MEDICAL HISTORY:  No past medical history on file.    PAST SURGICAL HISTORY:  Past Surgical History:   Procedure Laterality Date    HYSTERECTOMY, PAP NO LONGER INDICATED      HYSTERECTOMY, PAP NO LONGER INDICATED      MASTECTOMY, BILATERAL         FAMILY HISTORY:  History reviewed. No pertinent family history.    SOCIAL HISTORY:  Social History     Socioeconomic History    Marital status:      Spouse name: None    Number of children: None    Years of education: None    Highest education level: None   Tobacco Use    Smoking status: Never     Passive exposure: Never    Smokeless tobacco: Never   Substance and Sexual Activity    Alcohol use: Not Currently    Drug use: Never       Review of Systems:  Skin:  not assessed       Eyes:  not assessed      ENT:  not  assessed      Respiratory:  Positive for shortness of breath;cough;sleep apnea     Cardiovascular:    Positive for;lightheadedness;edema swelling/soreness in right hand and arm; edema of the ankles  Gastroenterology: not assessed      Genitourinary:  not assessed      Musculoskeletal:  not assessed      Neurologic:  Positive for numbness or tingling of hands numbness in both arms  Psychiatric:  not assessed      Heme/Lymph/Imm:  not assessed      Endocrine:  not assessed        Physical Exam:  Vitals: /72   Pulse 60   Wt 115.9 kg (255 lb 9.6 oz)   LMP  (LMP Unknown)   BMI 48.30 kg/m      General patient appears comfortable  Neck normal JVP, negative hepatojugular reflux  Cardiovascular system S1-S2 normal no murmur rub or gallop  Respiratory system clear to auscultation   Extremities no edema  Neurological alert, oriented    CC  Behzad Correa PA-C  [id number] [place] [address]                  Thank you for allowing me to participate in the care of your patient.      Sincerely,     Saji Carpenter MD     [facility]

## 2023-10-11 ASSESSMENT — ASTHMA QUESTIONNAIRES: ACT_TOTALSCORE: 22

## 2023-10-11 ASSESSMENT — PATIENT HEALTH QUESTIONNAIRE - PHQ9
SUM OF ALL RESPONSES TO PHQ QUESTIONS 1-9: 6
10. IF YOU CHECKED OFF ANY PROBLEMS, HOW DIFFICULT HAVE THESE PROBLEMS MADE IT FOR YOU TO DO YOUR WORK, TAKE CARE OF THINGS AT HOME, OR GET ALONG WITH OTHER PEOPLE: SOMEWHAT DIFFICULT
SUM OF ALL RESPONSES TO PHQ QUESTIONS 1-9: 6

## 2023-10-12 ENCOUNTER — HOSPITAL ENCOUNTER (OUTPATIENT)
Dept: ULTRASOUND IMAGING | Facility: CLINIC | Age: 63
Discharge: HOME | End: 2023-10-12
Attending: FAMILY MEDICINE
Payer: COMMERCIAL

## 2023-10-12 ENCOUNTER — ANCILLARY PROCEDURE (OUTPATIENT)
Dept: GENERAL RADIOLOGY | Facility: CLINIC | Age: 63
End: 2023-10-12
Payer: COMMERCIAL

## 2023-10-12 ENCOUNTER — OFFICE VISIT (OUTPATIENT)
Dept: PHARMACY | Facility: CLINIC | Age: 63
End: 2023-10-12

## 2023-10-12 ENCOUNTER — OFFICE VISIT (OUTPATIENT)
Dept: PEDIATRICS | Facility: CLINIC | Age: 63
End: 2023-10-12
Payer: COMMERCIAL

## 2023-10-12 ENCOUNTER — OFFICE VISIT (OUTPATIENT)
Dept: FAMILY MEDICINE | Facility: CLINIC | Age: 63
End: 2023-10-12
Payer: COMMERCIAL

## 2023-10-12 VITALS
SYSTOLIC BLOOD PRESSURE: 181 MMHG | TEMPERATURE: 98.1 F | BODY MASS INDEX: 48.18 KG/M2 | DIASTOLIC BLOOD PRESSURE: 74 MMHG | OXYGEN SATURATION: 99 % | HEART RATE: 53 BPM | WEIGHT: 255 LBS

## 2023-10-12 VITALS
DIASTOLIC BLOOD PRESSURE: 74 MMHG | BODY MASS INDEX: 48.26 KG/M2 | WEIGHT: 255.6 LBS | RESPIRATION RATE: 18 BRPM | HEIGHT: 61 IN | SYSTOLIC BLOOD PRESSURE: 181 MMHG | OXYGEN SATURATION: 96 % | HEART RATE: 53 BPM

## 2023-10-12 DIAGNOSIS — I50.9 CONGESTIVE HEART FAILURE, UNSPECIFIED HF CHRONICITY, UNSPECIFIED HEART FAILURE TYPE (H): ICD-10-CM

## 2023-10-12 DIAGNOSIS — M25.511 ACUTE PAIN OF BOTH SHOULDERS: ICD-10-CM

## 2023-10-12 DIAGNOSIS — M05.79 RHEUMATOID ARTHRITIS INVOLVING MULTIPLE SITES WITH POSITIVE RHEUMATOID FACTOR (H): ICD-10-CM

## 2023-10-12 DIAGNOSIS — M79.89 SWELLING OF RIGHT UPPER EXTREMITY: ICD-10-CM

## 2023-10-12 DIAGNOSIS — R20.2 ARM PARESTHESIA, RIGHT: ICD-10-CM

## 2023-10-12 DIAGNOSIS — J45.40 MODERATE PERSISTENT ASTHMA WITHOUT COMPLICATION: Primary | ICD-10-CM

## 2023-10-12 DIAGNOSIS — M25.512 ACUTE PAIN OF BOTH SHOULDERS: ICD-10-CM

## 2023-10-12 DIAGNOSIS — M25.512 ACUTE PAIN OF BOTH SHOULDERS: Primary | ICD-10-CM

## 2023-10-12 DIAGNOSIS — Z79.4 TYPE 2 DIABETES MELLITUS WITH HYPERGLYCEMIA, WITH LONG-TERM CURRENT USE OF INSULIN (H): ICD-10-CM

## 2023-10-12 DIAGNOSIS — M25.511 ACUTE PAIN OF BOTH SHOULDERS: Primary | ICD-10-CM

## 2023-10-12 DIAGNOSIS — E78.5 HYPERLIPIDEMIA LDL GOAL <70: ICD-10-CM

## 2023-10-12 DIAGNOSIS — I25.10 ATHEROSCLEROSIS OF NATIVE CORONARY ARTERY WITHOUT ANGINA PECTORIS, UNSPECIFIED WHETHER NATIVE OR TRANSPLANTED HEART: ICD-10-CM

## 2023-10-12 DIAGNOSIS — R03.0 ELEVATED BP WITHOUT DIAGNOSIS OF HYPERTENSION: ICD-10-CM

## 2023-10-12 DIAGNOSIS — E11.65 TYPE 2 DIABETES MELLITUS WITH HYPERGLYCEMIA, WITH LONG-TERM CURRENT USE OF INSULIN (H): ICD-10-CM

## 2023-10-12 LAB
ANION GAP SERPL CALCULATED.3IONS-SCNC: 10 MMOL/L (ref 7–15)
BUN SERPL-MCNC: 16 MG/DL (ref 8–23)
CALCIUM SERPL-MCNC: 9.5 MG/DL (ref 8.8–10.2)
CHLORIDE SERPL-SCNC: 101 MMOL/L (ref 98–107)
CHOLEST SERPL-MCNC: 185 MG/DL
CREAT SERPL-MCNC: 0.8 MG/DL (ref 0.51–0.95)
DEPRECATED HCO3 PLAS-SCNC: 27 MMOL/L (ref 22–29)
EGFRCR SERPLBLD CKD-EPI 2021: 82 ML/MIN/1.73M2
GLUCOSE SERPL-MCNC: 151 MG/DL (ref 70–99)
HBA1C MFR BLD: 8.9 % (ref 0–5.6)
HDLC SERPL-MCNC: 60 MG/DL
HOLD SPECIMEN: NORMAL
HOLD SPECIMEN: NORMAL
LDLC SERPL CALC-MCNC: 94 MG/DL
NONHDLC SERPL-MCNC: 125 MG/DL
POTASSIUM SERPL-SCNC: 4.3 MMOL/L (ref 3.4–5.3)
SODIUM SERPL-SCNC: 138 MMOL/L (ref 135–145)
TRIGL SERPL-MCNC: 155 MG/DL

## 2023-10-12 PROCEDURE — 73030 X-RAY EXAM OF SHOULDER: CPT | Mod: TC | Performed by: RADIOLOGY

## 2023-10-12 PROCEDURE — 93971 EXTREMITY STUDY: CPT | Mod: RT

## 2023-10-12 PROCEDURE — 99207 PR FIRST ORDER ACUTE REFERRAL: CPT

## 2023-10-12 PROCEDURE — 93971 EXTREMITY STUDY: CPT | Mod: XS

## 2023-10-12 PROCEDURE — 80048 BASIC METABOLIC PNL TOTAL CA: CPT

## 2023-10-12 PROCEDURE — 80061 LIPID PANEL: CPT

## 2023-10-12 PROCEDURE — 99607 MTMS BY PHARM ADDL 15 MIN: CPT | Performed by: PHARMACIST

## 2023-10-12 PROCEDURE — 99606 MTMS BY PHARM EST 15 MIN: CPT | Performed by: PHARMACIST

## 2023-10-12 PROCEDURE — 99214 OFFICE O/P EST MOD 30 MIN: CPT | Performed by: FAMILY MEDICINE

## 2023-10-12 PROCEDURE — 36415 COLL VENOUS BLD VENIPUNCTURE: CPT

## 2023-10-12 PROCEDURE — 83036 HEMOGLOBIN GLYCOSYLATED A1C: CPT

## 2023-10-12 RX ORDER — BUDESONIDE AND FORMOTEROL FUMARATE DIHYDRATE 80; 4.5 UG/1; UG/1
AEROSOL RESPIRATORY (INHALATION)
Qty: 20.4 G | Refills: 3 | Status: SHIPPED | OUTPATIENT
Start: 2023-10-12

## 2023-10-12 ASSESSMENT — ENCOUNTER SYMPTOMS: HYPERTENSION: 1

## 2023-10-12 ASSESSMENT — ANXIETY QUESTIONNAIRES
GAD7 TOTAL SCORE: 7
2. NOT BEING ABLE TO STOP OR CONTROL WORRYING: SEVERAL DAYS
4. TROUBLE RELAXING: NOT AT ALL
1. FEELING NERVOUS, ANXIOUS, OR ON EDGE: MORE THAN HALF THE DAYS
GAD7 TOTAL SCORE: 7
3. WORRYING TOO MUCH ABOUT DIFFERENT THINGS: SEVERAL DAYS
5. BEING SO RESTLESS THAT IT IS HARD TO SIT STILL: MORE THAN HALF THE DAYS
6. BECOMING EASILY ANNOYED OR IRRITABLE: SEVERAL DAYS
IF YOU CHECKED OFF ANY PROBLEMS ON THIS QUESTIONNAIRE, HOW DIFFICULT HAVE THESE PROBLEMS MADE IT FOR YOU TO DO YOUR WORK, TAKE CARE OF THINGS AT HOME, OR GET ALONG WITH OTHER PEOPLE: NOT DIFFICULT AT ALL
7. FEELING AFRAID AS IF SOMETHING AWFUL MIGHT HAPPEN: NOT AT ALL

## 2023-10-12 NOTE — PROGRESS NOTES
Assessment & Plan     Acute pain of both shoulders  - Referral to Acute and Diagnostic Services (Day of diagnostic / First order acute)    Swelling of right upper extremity  - Referral to Acute and Diagnostic Services (Day of diagnostic / First order acute)  - US Upper Extremity Venous Duplex Right    Arm paresthesia, right  - Referral to Acute and Diagnostic Services (Day of diagnostic / First order acute)  - US Upper Extremity Venous Duplex Right  - US Up Ex Art & Lan Thor Outlet Syn Right Non CSC     Imaging rules out DVT and findings suggests against vascular cause of thoracic outlet syndrome. I will advise she proceed with physical therapy as ordered thru her PCP. Should symptoms worsen seek help right away.     As xray suggested if discomfort is consistent with pain in the subscapularis region/rotator cuff movement consider proceeding with MRI or referring to sports medicine      Sd Chambers MD   [facility]    Wali Chan is a 63 year old female who presents to clinic today for the following health issues:  Chief Complaint   Patient presents with    Shoulder Pain     Right shoulder     HPI  Comes in today as a referral for primary care as she had reported months of right arm pain with possible new swelling  Patient with hx of seronegative erosive RA recently started on humira after poor response to steroids/methotrexate.     No prior hx of blood clots. She is also experiencing tingling throughout the arm. Pain is present over trapezius region also.     No recent trauma or falls.   Patient Active Problem List    Diagnosis Date Noted    Atherosclerotic heart disease of native coronary artery without angina pectoris 10/27/2022     Priority: Medium     Formatting of this note might be different from the original.  moderate LAD stenosis by CT angiography      History of malignant neoplasm of breast 10/27/2022     Priority: Medium     Formatting of this note might be different from the  original.  2013      History of squamous cell carcinoma of skin 10/27/2022     Priority: Medium    Rheumatoid arthritis involving multiple sites with positive rheumatoid factor (H) 10/27/2022     Priority: Medium    Moderate persistent asthma 02/04/2022     Priority: Medium     Last Assessment & Plan:   Formatting of this note might be different from the original.  Patient seems to be doing fairly well on her current regimen of Symbicort and albuterol.  Will continue to monitor.  Consider repeat PFTs further out from acute illness.      Depression 01/13/2022     Priority: Medium    Gastroesophageal reflux disease 01/13/2022     Priority: Medium    Hyperlipidemia 01/13/2022     Priority: Medium    Migraine headache 01/12/2022     Priority: Medium     Formatting of this note might be different from the original.  Associated with photophobia. Treated with over-the-counter medications, no prescriptions.      History of severe acute respiratory syndrome coronavirus 2 (SARS-CoV-2) disease 11/19/2021     Priority: Medium    Neuropathy, peripheral 02/04/2020     Priority: Medium    Congestive heart failure (H) 12/19/2018     Priority: Medium     Formatting of this note might be different from the original.  preserved ejection fraction, EF 60% ECHO 12/14/18    Last Assessment & Plan:   Formatting of this note might be different from the original.  HFpEF, EF 60% on Echo 12/14/2018  Continue aspirin 81 mg  Continue metoprolol succinate 50mg  Continue furosemide 40 mg daily      Type 2 diabetes mellitus with hyperglycemia, with long-term current use of insulin (H) 08/28/2017     Priority: Medium     Formatting of this note is different from the original.  Current Meds: Aspart insulin 6-10 units with meals as needed. Insulin glargine 50 units at bedtime. Metformin 1000 mg BID.     [place] D 4/5 (A1c)  Blood pressure: /64 (BP Location: Right arm, Patient Position: Sitting, Cuff Size: Large)  prior: 100/65  Statin:  Atorvastatin 40mg   Aspirin: 81 mg daily  Tobacco: non-smoker  A1c:   Lab Results   Component Value Date    HGBA1C 10.5 (H) 01/11/2022    HGBA1C 10.8 (H) 09/08/2020    HGBA1C 12.6 (H) 01/10/2020     Renal Health: Stage 2 (eGFR 60-89)  Lab Results   Component Value Date    EGFRNONBLKAA 75 01/13/2022     Lab Results   Component Value Date    UMCROALBCONC <5.0 08/25/2017    CREATININEUR 108 01/10/2020    ALBCREARATIO <5 01/10/2020         Last Assessment & Plan:   Formatting of this note might be different from the original.  She has made great improvement since last check a few weeks ago both in diet and activity levels. This is greatly reflected in her blood sugar readings. I don't think we need to add additional medications at this time for diabetic control. Will recheck A1c in 2 months.      Morbid obesity (H) 04/14/2017     Priority: Medium    Female cystocele 06/03/2016     Priority: Medium    Obstructive sleep apnea syndrome in adult 11/11/2014     Priority: Medium     Last Assessment & Plan:   Formatting of this note might be different from the original.  Given evidence of significant CO2 retention despite improving respiratory symptoms and utilization of CPAP during hospitalization, will refer back to sleep medicine for consideration of adjustment of CPAP vs BiPAP.      Status post bilateral mastectomy 03/11/2014     Priority: Medium    Seasonal allergies 09/09/2013     Priority: Medium    Restless leg syndrome 11/17/2010     Priority: Medium    Sensorineural hearing loss (SNHL) of both ears 11/17/2010     Priority: Medium    Lichen sclerosus 09/14/2007     Priority: Medium       Current Outpatient Medications   Medication    acetaminophen (TYLENOL) 500 MG tablet    adalimumab (HUMIRA *CF*) 40 MG/0.4ML pen kit    aspirin (ASA) 81 MG EC tablet    atorvastatin (LIPITOR) 40 MG tablet    blood glucose (PRECISION QID TEST) test strip    budesonide-formoterol (SYMBICORT) 80-4.5 MCG/ACT Inhaler    buPROPion  "(WELLBUTRIN SR) 150 MG 12 hr tablet    citalopram (CELEXA) 20 MG tablet    clobetasol (TEMOVATE) 0.05 % external ointment    Continuous Blood Gluc Sensor (FREESTYLE REYNOLD 3 SENSOR) MISC    dicyclomine (BENTYL) 10 MG capsule    folic acid (FOLVITE) 1 MG tablet    furosemide (LASIX) 20 MG tablet    gabapentin (NEURONTIN) 300 MG capsule    glucose-vitamin C 4-6 GM-MG CHEW    insulin aspart (NOVOLOG PEN) 100 UNIT/ML pen    insulin glargine (LANTUS SOLOSTAR) 100 UNIT/ML pen    insulin syringe-needle U-100 (30G X 1/2\" 0.3 ML) 30G X 1/2\" 0.3 ML miscellaneous    insulin syringe-needle U-100 (30G X 1/2\" 0.3 ML) 30G X 1/2\" 0.3 ML miscellaneous    ipratropium - albuterol 0.5 mg/2.5 mg/3 mL (DUONEB) 0.5-2.5 (3) MG/3ML neb solution    loratadine-pseudoePHEDrine (CLARITIN-D 12-HOUR) 5-120 MG 12 hr tablet    melatonin 5 MG tablet    metFORMIN (GLUCOPHAGE) 1000 MG tablet    metoprolol succinate ER (TOPROL XL) 50 MG 24 hr tablet    montelukast (SINGULAIR) 10 MG tablet    pantoprazole (PROTONIX) 40 MG EC tablet    pramipexole (MIRAPEX) 0.125 MG tablet    sodium chloride (NEBUSAL) 3 % neb solution    valsartan (DIOVAN) 160 MG tablet     No current facility-administered medications for this visit.           Objective    BP (!) 181/74   Pulse 53   Temp 98.1  F (36.7  C)   Wt 115.7 kg (255 lb)   LMP  (LMP Unknown)   SpO2 99%   BMI 48.18 kg/m    Physical Exam         R shoulder: belly lift off normal, subscapularis test negative, able to perform cross arm test, no external rotator cuff weakness  R forearm: absent of swelling  Hands: 5/5  bilaterally, cap refill < 2 seconds, warm to touch  Results for orders placed or performed during the hospital encounter of 10/12/23   US Up Ex Art & Lan Thor Outlet Syn Right Non CSC     Status: None    Narrative    US UPPER EXTREMITIES ARTERIAL AND VENOUS THORACIC OUTLET SYNDROME  RIGHT NON CSC   10/12/2023 3:20 PM     HISTORY: Assess for TOS. Arm paresthesia, right.    COMPARISON: DVT study " from same day.    FINDINGS: Color Doppler and spectral waveform analysis was performed  throughout the right subclavian veins and arteries with the arm in  neutral, at 90 degrees, 180 degrees,  press,  press  head turned right and  press head turned left.    Right subclavian vein: The right subclavian vein is patent with all  maneuvers. No evidence of compression or occlusion. Normal waveforms  throughout.    Right subclavian artery: The right subclavian artery is patent with  all maneuvers. Elevated velocities are noted in the right subclavian  artery with the arm at 90 degrees, however, no visual stenosis is  identified. No evidence of stenosis or occlusion. Waveforms are  multiphasic throughout.      Impression    IMPRESSION:   1. Right subclavian vein is patent with all maneuvers.  2. Right subclavian artery is patent with all maneuvers. Elevated  velocities are noted with the arm at 90 degrees, however, no visual  stenosis or occlusion is identified.    STEFANIA LING DO         SYSTEM ID:  [id number]   Results for orders placed or performed during the hospital encounter of 10/12/23   US Upper Extremity Venous Duplex Right     Status: None    Narrative    US UPPER EXTREMITY VENOUS DUPLEX RIGHT 10/12/2023 3:13 PM    CLINICAL HISTORY/INDICATION:  arm swelling, pain. Assess for TOS and  DVT r/o.; Swelling of right upper extremity; Arm paresthesia, right    COMPARISON:  None relevant    TECHNIQUE:   Grayscale, color-flow, and spectral waveform analysis were performed  of the deep veins of the right upper extremity    FINDINGS:   The right jugular vein demonstrates normal compressibility, color-flow  and spectral waveform.    The right subclavian vein, axillary vein, cephalic vein, brachial vein  and basilic vein demonstrate normal compressibility, spectral  waveform, color flow and augmentation.      Impression    IMPRESSION:   No evidence of deep venous thrombosis in the right upper  extremity    STEFANIA LING DO         SYSTEM ID:  [id number]   Results for orders placed or performed in visit on 10/12/23   XR Shoulder Bilateral G/E 2 Views     Status: None    Narrative    XR SHOULDER BILATERAL G/E 2 VIEWS   10/12/2023 11:44 AM     HISTORY: bilateral shoulder pain; Acute pain of both shoulders; Acute  pain of both shoulders  COMPARISON: None.       Impression    IMPRESSION:     RIGHT SHOULDER: No acute fracture or dislocation. There is mild  acromioclavicular degenerative arthrosis. Surgical clips project over  the right axilla.    LEFT SHOULDER: No acute fracture or dislocation. There is mild to  moderate acromioclavicular degenerative arthrosis. Surgical clips  project over the left axilla. There are small nonspecific foci of  mineralization along the inferior aspect of the glenohumeral joint  which could reflect small intra-articular loose bodies or relate to  prior injury. Alternatively, these could indicate calcium  hydroxyapatite deposition in the region of the subscapularis tendon  insertion. MRI could be performed for better evaluation.    SINAI VERDUZCO MD         SYSTEM ID:  [id number]   HEMOGLOBIN A1C     Status: Abnormal   Result Value Ref Range    Hemoglobin A1C 8.9 (H) 0.0 - 5.6 %   Extra Tube     Status: None    Narrative    The following orders were created for panel order Extra Tube.  Procedure                               Abnormality         Status                     ---------                               -----------         ------                     Extra Serum Separator Tu...[[id number]]                      Final result               Extra Green Top (Lithium...[[id number]]                      Final result                 Please view results for these tests on the individual orders.   Extra Serum Separator Tube (SST)     Status: None   Result Value Ref Range    Hold Specimen JIC    Extra Green Top (Lithium Heparin) Tube     Status: None   Result Value Ref Range     Hold Specimen JIC                      The use of Dragon/[employer] dictation services may have been used to construct the content in this note; any grammatical or spelling errors are non-intentional. Please contact the author of this note directly if you are in need of any clarification.

## 2023-10-12 NOTE — PATIENT INSTRUCTIONS
"Recommendations from today's MTM visit:                                                      Take Novolog 10 units  PLUS  An insulin correction scale use 1 unit rapid-acting insulin (Humalog or Novolog) to correct for every 25 mg/dL that blood glucose is above 150 mg/dL.  <50 mg/dL:           take no insulin injections  50-69 mg/dL:        minus 4 units of insulin  70-99 mg/dL:        minus 2 units   100-150 mg/dL:    no adjustment  150-174 mg/dL:    add 1 unit of insulin  175-199 mg/dL:    add 2 units  200-224 mg/dL:    add 3 units   225-249 mg/dL:    add 4 units    250-274 mg/dL:    add 5 units  275-299 mg/dL:    add 6 units  300-324 mg/dL:    add 7 units  325-349 mg/dL:    add 8 units  350-374 mg/dL:    add 9 units  375-399 mg/dL:    add 10 units  >400 mg/dL:         add 11 units and let Lorraine know    Restart Symbicort    Follow-up: No follow-ups on file.    It was great speaking with you today.  I value your experience and would be very thankful for your time in providing feedback in our clinic survey. In the next few days, you may receive an email or text message from [employer] with a link to a survey related to your  clinical pharmacist.\"     To schedule another MTM appointment, please call the clinic directly or you may call the MTM scheduling line at [phone] or toll-free at [phone].     My Clinical Pharmacist's contact information:                                                      Please feel free to contact me with any questions or concerns you have.      Lorraine Sky, PharmD  Medication Therapy Management Pharmacist  [phone]     "

## 2023-10-12 NOTE — PATIENT INSTRUCTIONS
Tylenol (1000 mg) three times a day  -please take food with these    Topical Analgesics (Icy Hot, Biofreeze, Voltaren Gel)  -can use up to 3-4 times a day    Ice (inflammation)/ Heat (Muscle relaxing)    Physical therapy referral     ADS for imaging today

## 2023-10-12 NOTE — PROGRESS NOTES
Medication Therapy Management (MTM) Encounter    ASSESSMENT:                            Medication Adherence/Access: see below, patient unfortunately was running late for appointment and forgot to take medications.    Type 2 Diabetes:   Needs improvement, suggest we start an adjustment scale given a fixed dose is causing patient to experience high and lows blood sugars from either under or over correction.    HFpEF/Hypertension:   Blood pressure not at goal today, however from missed medications. Her blood pressure was < 140/90 mm Hg at recent cardiology visit and home blood pressure has been at goal as well. Also recommend repeat basic metabolic panel to monitor kidney function, recent elevation in Scr.    Hyperlipidemia:   stable    Asthma:   Recommend patient restart symbicort but change to SMART to help with adherence.      PLAN:                            Placed new Renetta 3 today.  Connect to the Clinic on the [facility].  Step 1: Open the Settings Menu. Click the three blue lines to open the Settings Menu.  Step 2: Click Connected Apps.  Step 3: Click Connect or Manage next to [facility].  Step 4: Clinic Connect to a Practice:  Step 5: Link to a practice: To connect to [facility] enter the following Practice ID: [id number]  in the field provided and click the Add button.     Take Novolog 10 units + 1:25 when blood sugar over 150 mg/dL.     Restart Symbicort.    --    Next visit:  1) RLS: ferritin check  2) review vaccines, long-term PPI use, and GI history she has dicyclomine as needed?  3) bupropion XL vs SR?      Follow-up: Return in about 2 weeks (around 10/26/2023) for MyChart.    SUBJECTIVE/OBJECTIVE:                          Rocio Price is a 63 year old female coming in for a follow-up visit from 7/20/23. Patient saw provider prior to our visit today.      Reason for visit: renetta sensor fell off again. She forgot to take her morning medications today.    Allergies/ADRs: Reviewed in chart  Past  Medical History: Reviewed in chart  Tobacco: She reports that she has never smoked. She has never been exposed to tobacco smoke. She has never used smokeless tobacco.  Alcohol: reviewed in chart    Medication Adherence/Access:  Patient takes medications out of a pill box  Patient takes medications 3 time(s) per day:   -- Morning: metformin, citalopram, atorvastatin, bupropion, pantoprazole, gabapentin, furosemide   -- Afternoon: gabapentin, pramipexole, furosemide  -- Before bedtime: gabapentin, pramipexole, metformin, valsartan, montelukast, metoprolol  Per patient, misses at bedtime Lantus a few times in a month  Medication barriers: affording medications - Farxiga, no prescription insurance  The patient fills medications at [facility]: NO, fills medications at various pharmacy for best price due to lack of medication insurance.    Diabetes   Metformin IR 1000 mg twice daily   Lantus 40 units at morning  Novolog 14 units before/after meals  Glucose chew as needed hypoglycemia    Patient is not experiencing side effects.  Blood sugar monitoring: Continuous Glucose Monitor meron 3 but no sensor on and no readings to share.  Has had lows after meals. She is wondering about an adjustment scale.   Current diabetes symptoms:  numbness/tingling.     Eye exam in the last 12 months? No  Foot exam: due  Urine Albumin:   Lab Results   Component Value Date    [facility]  06/15/2023      Comment:      Unable to calculate, urine albumin and/or urine creatinine is outside detectable limits.  Microalbuminuria is defined as an albumin:creatinine ratio of 17 to 299 for males and 25 to 299 for females. A ratio of albumin:creatinine of 300 or higher is indicative of overt proteinuria.  Due to biologic variability, positive results should be confirmed by a second, first-morning random or 24-hour timed urine specimen. If there is discrepancy, a third specimen is recommended. When 2 out of 3 results are in the microalbuminuria range, this is  evidence for incipient nephropathy and warrants increased efforts at glucose control, blood pressure control, and institution of therapy with an angiotensin-converting-enzyme (ACE) inhibitor (if the patient can tolerate it).        Lab Results   Component Value Date    A1C 8.9 (H) 10/12/2023     Wt Readings from Last 4 Encounters:   10/12/23 255 lb (115.7 kg)   10/12/23 255 lb 9.6 oz (115.9 kg)   10/10/23 255 lb 9.6 oz (115.9 kg)   08/23/23 246 lb 3.2 oz (111.7 kg)       Hypertension /HFpEF:   valsartan 160 mg daily  Metoprolol ER 50 mg daily  Furosemide 20 mg twice daily  Aspirin 81 mg daily     Shortness of breath and a little of a cough. Comes and goes. From chart review, patient had CT cardiac angiogram on 12/10/2018 which noted moderate stenosis of the proximal LAD. Last ECHO from 7/1/2022 with EF of 60%.   Patient reports no current medication side effects.     Patient is not measuring daily weights but she has a scale at home.  Patient does is self-monitor blood pressure, 120-130/80's  Patient is somewhat following a low sodium diet, is avoiding EtOH.  Patient reports no current medication side effects     BP Readings from Last 3 Encounters:   10/12/23 (!) 181/74   10/12/23 (!) 181/74   10/10/23 132/72     Pulse Readings from Last 3 Encounters:   10/12/23 53   10/12/23 53   10/10/23 60     Wt Readings from Last 4 Encounters:   10/12/23 255 lb (115.7 kg)   10/12/23 255 lb 9.6 oz (115.9 kg)   10/10/23 255 lb 9.6 oz (115.9 kg)   08/23/23 246 lb 3.2 oz (111.7 kg)       Hyperlipidemia   atorvastatin 40mg daily    Not fasting today for repeating labs. Patient reports no significant myalgias or other side effects.     Recent Labs   Lab Test 10/12/23  1147 10/27/22  0944   CHOL 185 155   HDL 60 78   LDL 94 55   TRIG 155* 110       Asthma:   ICS/LABA - Symbicort 80/4.5 mcg - 2 puffs twice daily  -NOT taking  Montelukast 10mg at bedtime     Albuterol (ProAir/Ventolin/Proventil)  Ipratropium and albuterol Neb  (DuoNeb)  Saline neb     Not using any of her inhalers and has been getting coughing and shortness of breath intermittently  Triggers include: pollens.      Today's Vitals: LMP  (LMP Unknown)   ----------------      I spent 27 minutes with this patient today. All changes were made via collaborative practice agreement with Behzad Correa PA-C. A copy of the visit note was provided to the patient's provider(s).    A summary of these recommendations was given to the patient.    Lorraine Sky, PharmD  Medication Therapy Management Pharmacist  [phone]       Medication Therapy Recommendations  Moderate persistent asthma    Current Medication: albuterol (PROAIR HFA/PROVENTIL HFA/VENTOLIN HFA) 108 (90 Base) MCG/ACT inhaler (Discontinued)   Rationale: More effective medication available - Ineffective medication - Effectiveness   Recommendation: Change Medication - Symbicort 80-4.5 MCG/ACT Aero   Status: Accepted per CPA         Type 2 diabetes mellitus with hyperglycemia, with long-term current use of insulin (H)    Current Medication: insulin aspart (NOVOLOG PEN) 100 UNIT/ML pen   Rationale: Dose too high - Dosage too high - Safety   Recommendation: Decrease Dose   Status: Accepted per CPA

## 2023-10-12 NOTE — PROGRESS NOTES
Assessment & Plan     (M25.511,  M25.512) Acute pain of both shoulders  (primary encounter diagnosis)  (M79.89) Swelling of right upper extremity  (R20.2) Arm paresthesia, right  Comment: will get shoulder xray today to further evaluate structure. Given patient newer right arm paresthesia and swelling concerned for TOS. Cannot rule out adhesive capsulitis, cervical radiculopathy, and patient history of rheumatologic arthiritis being contributory to discomfort. Given concern for TOS will send patient to [facility]. Discussed w/ [facility] provider Dr. Mccarty and will have upper extremity US performed. PT order was placed as well, discussed having patient follow up on this if everything returns unremarkable on a structural standpoint w/ fractures etc.   Plan: XR Shoulder Bilateral G/E 2 Views, Physical         Therapy Referral, Referral to Acute and         Diagnostic Services (Day of diagnostic / First         order acute)    Referral to Acute and Diagnostic Services    The [facility] has been contacted at [phone] ([place]) to confirm patient acceptance. The transition to Acute & Diagnostic Services Clinic has been discussed with patient, and she agrees with next level of care.  Patient understands that evaluation/treatment at [facility] typically takes significantly longer than in clinic/urgent care (>2 hours).        Special issues:  None        Referral placed: Yes  Patient has transportation arranged and will travel to the [facility] without delay: Yes  Patient aware not to eat or drink. Yes    The following provider has assessed this patient for intervention at [facility], and directed the patient for referral: ROMEO Marshall CNP         (M05.79) Rheumatoid arthritis involving multiple sites with positive rheumatoid factor (H)  Comment: cannot rule out being contributory to pain. Follows rheumatology, transitioning from methtrexate and prednisone to Humira.   Plan: continue care w/  Rheumatology    (E11.65,  Z79.4) Type 2 diabetes mellitus with hyperglycemia, with long-term current use of insulin (H)  Comment: A1C today improved, still not at goal. Visit w/ MTM today.   Plan: HEMOGLOBIN A1C    (I25.10) Atherosclerosis of native coronary artery without angina pectoris, unspecified whether native or transplanted heart  Comment: on ASA, statin, and metoprolol. Continue.  labs today, follow up on results when obtained. Patient BP elevated today, patient has not taken her BP medications today. Suspect this reason why elevated, patient to take BP medicine when she gets home. Patient fully understands and is agreeable with plan of care, at this point patient will follow up as needed unless acute concerns arise in the meantime.  Plan: Lipid panel reflex to direct LDL Non-fasting    40 minutes spent by me on the date of the encounter doing chart review, history and exam, documentation and further activities per the [facility]    JohnROMEO Brooke CNP   Rocio is a 63 year old, presenting for the following health issues:  Hypertension, Shoulder Pain, and Diabetes (Follow up/)        10/12/2023    10:49 AM   Additional Questions   Roomed by Ava Bonilla       History of Present Illness       Diabetes:   She presents for follow up of diabetes.  She is checking home blood glucose a few times a month.   She checks blood glucose before meals.  Blood glucose is sometimes over 200 and sometimes under 70. She is aware of hypoglycemia symptoms including shakiness, dizziness and lethargy.   She is concerned about blood sugar frequently over 200.   She is having numbness in feet, burning in feet and weight gain.  The patient has had a diabetic eye exam in the last 12 months. Eye exam performed on Unknown. Location of last eye exam [facility].        Reason for visit:  Diabetes and shoulder /arm pain    She eats 2-3 servings of fruits and vegetables daily.She  consumes 1 sweetened beverage(s) daily.She exercises with enough effort to increase her heart rate 9 or less minutes per day.  She exercises with enough effort to increase her heart rate 3 or less days per week. She is missing 1 dose(s) of medications per week.     Pain History:  When did you first notice your pain? 3 months   Have you seen this provider for your pain in the past? No   Where in your body do your have pain? Shoulders and down right arm  Are you seeing anyone else for your pain? No  What makes your pain better? None- but sometimes extra strength tylenol  What makes your pain worse? Its just there- worse at night  How has pain affected your ability to work? Not currently working - unrelated to pain  Who lives in your household?     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 Sharp shooting, aching  Have you had any recent changes to the severity or character of your pain? Increased, 7/10 or worse   Is there an underlying cause that has been identified? 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 yes  Which of these pain treatments have you tried? Tylenol, Humira (transitioning from methotrexate and prednisone)  Previous medication  "treatments:  Methotrexate and Prednisone    HTN   -Has not taken bp meds today          Review of Systems   Constitutional, cardiovascular, pulmonary, musculoskeletal, neuro, endocrine systems are negative, except as otherwise noted.      Objective    BP (!) 181/74 (BP Location: Right arm, Patient Position: Sitting, Cuff Size: Adult Large)   Pulse 53   Resp 18   Ht 1.549 m (5' 1\")   Wt 115.9 kg (255 lb 9.6 oz)   LMP  (LMP Unknown)   SpO2 96%   BMI 48.30 kg/m    Body mass index is 48.3 kg/m .  Physical Exam  Vitals and nursing note reviewed.   Constitutional:       General: She is not in acute distress.     Appearance: Normal appearance. She is obese. She is not ill-appearing.   Cardiovascular:      Rate and Rhythm: Normal rate.   Pulmonary:      Effort: Pulmonary effort is normal.   Musculoskeletal:      Right shoulder: Tenderness and crepitus present. Decreased range of motion.      Left shoulder: Tenderness and crepitus present. Decreased range of motion.      Right upper arm: Swelling present.      Left upper arm: Tenderness present.      Right elbow: Swelling present.      Right forearm: Swelling present.      Left forearm: Tenderness present.      Right wrist: Swelling present.      Right hand: Swelling present.      Comments: Patient unable to complete a proper shoulder exam d/t diffuse pain w/ any movement, ROM significantly limited.    Skin:     General: Skin is warm and dry.   Neurological:      Mental Status: She is alert.   Psychiatric:         Mood and Affect: Mood normal.         Behavior: Behavior normal.         Thought Content: Thought content normal.         Judgment: Judgment normal.                  "

## 2023-10-22 ENCOUNTER — HEALTH MAINTENANCE LETTER (OUTPATIENT)
Age: 63
End: 2023-10-22

## 2023-10-23 ENCOUNTER — THERAPY VISIT (OUTPATIENT)
Dept: PHYSICAL THERAPY | Facility: CLINIC | Age: 63
End: 2023-10-23
Payer: COMMERCIAL

## 2023-10-23 DIAGNOSIS — M25.512 ACUTE PAIN OF BOTH SHOULDERS: Primary | ICD-10-CM

## 2023-10-23 DIAGNOSIS — M25.511 ACUTE PAIN OF BOTH SHOULDERS: Primary | ICD-10-CM

## 2023-10-23 PROCEDURE — 97110 THERAPEUTIC EXERCISES: CPT | Mod: GP | Performed by: PHYSICAL THERAPIST

## 2023-10-23 PROCEDURE — 97161 PT EVAL LOW COMPLEX 20 MIN: CPT | Mod: GP | Performed by: PHYSICAL THERAPIST

## 2023-10-23 PROCEDURE — 97140 MANUAL THERAPY 1/> REGIONS: CPT | Mod: GP | Performed by: PHYSICAL THERAPIST

## 2023-10-23 NOTE — PROGRESS NOTES
PHYSICAL THERAPY EVALUATION  Type of Visit: Evaluation    See electronic medical record for Abuse and Falls Screening details.    Subjective     Pt is a 63 year old female presenting with complaints of R>L neck pain, bilateral shoulder pain, and UE symptoms. Pain is located  R neck into entire shoulder and down arm into forearm. She notes headaches daily located in SO and posterior head regions.  The symptoms started several months ago and is getting worse since that time/  Pt describes the pain as constant with occasional sharp and burning.   The resulting functional limitations include reaching, sleeping, HH tasks including cooking and cleaning,    Pain is better with moving positions can temporarily help. Tylenol, icy hot   Sleep Quality: Can't sleep on R side at all. Poor quality. Wakes up frequently and troubles falling back to sleep.  Current level of activity: none. Barriers to exercise: pain, numbness.   Goals of therapy: sleep, HH chores, reduced headaches    XR 10/12/23 IMPRESSION:   RIGHT SHOULDER: No acute fracture or dislocation. There is mild  acromioclavicular degenerative arthrosis. Surgical clips project over  the right axilla.  LEFT SHOULDER: No acute fracture or dislocation. There is mild to  moderate acromioclavicular degenerative arthrosis. Surgical clips  project over the left axilla. There are small nonspecific foci of  mineralization along the inferior aspect of the glenohumeral joint  which could reflect small intra-articular loose bodies or relate to  prior injury. Alternatively, these could indicate calcium  hydroxyapatite deposition in the region of the subscapularis tendon  insertion. MRI could be performed for better evaluation.    Of note, pt has a hx of seronegative erosive RA recently started on humira after poor response to steroids/methotrexate.           Presenting condition or subjective complaint: Neck, shoulder and arm pain  Date of onset: 10/12/23 (Referral Date)    Relevant  medical history: Arthritis; Asthma; Diabetes; Dizziness; Hearing problems; Heart problems; High blood pressure; Overweight; Pain at night or rest; Sleep disorder like apnea   Dates & types of surgery: Feet  knees. Breasts    Prior diagnostic imaging/testing results: X-ray     Prior therapy history for the same diagnosis, illness or injury: No      Prior Level of Function  Transfers: Independent  Ambulation: Independent  ADL: Independent  IADL:  Independent    Living Environment  Social support: With a significant other or spouse   Type of home: House   Stairs to enter the home: Yes 3 Is there a railing: Yes   Ramp: Yes   Stairs inside the home: Yes 3 Is there a railing: Yes   Help at home: None  Equipment owned:       Employment: No    Hobbies/Interests:      Patient goals for therapy: Move without pain    Pain assessment: Location: Worst in neck and R shoulder/Ratin/10     Objective       SHOULDER:    Standing Posture: forward head posture, rounded shoulders      Shoulder: (* indicates patient's pain)   PROM L PROM R AROM L AROM R MMT L MMT R   Flex Increased ROM Increased  145** Did not assess due to pain Did not assess due to pain   Abd Increased ROM Increased  100* Did not assess due to pain Did not assess due to pain   IR     4-/5** 3+/5**   ER   Barely able to get to neutral Barely able to get to neutral 4-/5** 3+/5**   Ext/IR             Palpation: tender bilateral upper trap, scalenes, pectorals, levator scapulas, deltoids    Special tests:   L R   Impingement     Neer's - -   Hawkin's-Martin - -   Coracoid Impingement     Internal impingement     Labral     Anterior Slide     [place]'s     Crank     Instability     Apprehension (anterior) - -   Relocation (anterior)     Anterior Load & Shift     Posterior Load & Shift     Posterior instability (with 90 degrees flex)     Multi-Directional Instability      Sulcus     Biceps      Speed's     Rotator Cuff Tear     Drop Arm - -   Belly Press -  -   Lift off        GH/Capsular Mobility  L  R   Posterior glide WFL    Inferior glide     Anterior glide       CERVICAL:    Posture: rounded shoulders and FHP    Functional Strength:  -Seated Scapular Retraction: Unable to squeeze to neutral without pain    AROM: (Major, Moderate, Minimal or Nil loss)  Movement Loss Elvin Mod Min Nil Pain   Protrusion        Flexion  x      Retraction        Extension x       Left Rotation  x      Right Rotation x    pain   Left Side Bending x    pain   Right Side bending x    Pain        SPECIAL TESTS   R L   Spurlings/Quadrant Neck pian limited  Neck pian limited   ULTT 1 (median)     ULTT 2 (ulnar)     ULTT 3 (radial)     Distraction - -   Alar Ligament Test     Transverse Ligament Test             Assessment & Plan   CLINICAL IMPRESSIONS  Medical Diagnosis: Acute pain of both shoulders    Treatment Diagnosis: Bilateral Shoulder Pain   Impression/Assessment: Patient is a 63 year old female with R>L shoulder and neck complaints. Symptoms are widespread. Pt's pain is highly irritable thus unable to full decipher neck vs shoulder symptoms. At this time they seem to be separate issues- RTC tendinopathy and neck tension but will continue to assess in further sessions. The following significant findings have been identified: Pain, Decreased ROM/flexibility, Decreased joint mobility, Decreased strength, Impaired muscle performance, and Decreased activity tolerance. These impairments interfere with their ability to perform self care tasks, work tasks, recreational activities, and household chores as compared to previous level of function.     Clinical Decision Making (Complexity):  Clinical Presentation: Stable/Uncomplicated  Clinical Presentation Rationale: based on medical and personal factors listed in PT evaluation  Clinical Decision Making (Complexity): Low complexity    PLAN OF CARE  Treatment Interventions:  Modalities: Cryotherapy, Dry Needling, E-stim, Hot Pack  Interventions:  Manual Therapy, Neuromuscular Re-education, Therapeutic Activity, Therapeutic Exercise, Self-Care/Home Management    Long Term Goals     PT Goal 1  Goal Identifier: Sleep  Goal Description: Pt will be able to sleep through the night, waking <2x, without increase of symptoms while in bed  Rationale: to maximize safety and independence with performance of ADLs and functional tasks;to maximize safety and independence within the home;to maximize safety and independence with self cares  Goal Progress: current: wakes frequently with troubles falling back to sleep  Target Date: 01/15/24  PT Goal 2  Goal Identifier: Household Chores  Goal Description: Pt will able to complete cooking and cleaning throughout day without significant increase in symptoms <2/10 NRS.  Rationale: to maximize safety and independence within the home;to maximize safety and independence with self cares;to maximize safety and independence with performance of ADLs and functional tasks  Goal Progress: Current: completes tasks because she has to but with signifiant increase in symptoms  Target Date: 01/15/24      Frequency of Treatment: 1x/week for 4 weeks then 2x/month for 2 months  Duration of Treatment: 12 weeks    Recommended Referrals to Other Professionals:   Education Assessment:   Learner/Method: Patient  Education Comments: Eager to participate in therapy    Risks and benefits of evaluation/treatment have been explained.   Patient/Family/caregiver agrees with Plan of Care.     Evaluation Time:     PT Eval, Low Complexity Minutes (07533): 20       Signing Clinician: Filomena Smith PT

## 2023-11-02 ENCOUNTER — MYC MEDICAL ADVICE (OUTPATIENT)
Dept: RHEUMATOLOGY | Facility: CLINIC | Age: 63
End: 2023-11-02
Payer: COMMERCIAL

## 2023-11-06 NOTE — PROGRESS NOTES
Medication Therapy Management (MTM) Encounter    ASSESSMENT:                            Medication Adherence/Access: No issues identified.    Seronegative Erosive Rheumatoid Arthritis: Provided education on Humira today including monitoring and time to efficacy. Discussed data on malignancy and risk of serious infection in depth. Encouraged indicated non-live vaccines and avoidance of live vaccines. Patient is due for monitoring labs. Will defer to rheumatology provider to be taken at next appointment in about a week. Patient would benefit from continuing on Humira as directed. Recommend patient change dose of acetaminophen to 1000 mg four times daily for more consistent pain control and better safety.    Vaccines: Per ACIP guidelines, patient is eligible for: COVID-19 and Influenza.  Patient declined COVID-19.      PLAN:                            Continue taking Humira as prescribed.  It can take up to 3 months to reach its full effect.  Don't take more than 1000 mg of acetaminophen per dose (2 extra strength tablets).  May take it up to 4 times per day for better pain control.  Consider receiving the following vaccination:  Annual flu    Follow-up: on 11/13/23 with Jessica Dahl.  Luis Antonio will reach out to schedule appointment after your visit with Jessica.    SUBJECTIVE/OBJECTIVE:                          Rocio Price is a 63 year old female called for a follow-up visit from 08/29/2023.       Reason for visit: Humira follow-up, wants to know how long until Humira starts working.    Allergies/ADRs: Reviewed in chart  Past Medical History: Reviewed in chart  Tobacco: She reports that she has never smoked. She has never been exposed to tobacco smoke. She has never used smokeless tobacco.  Alcohol: Very seldom  Caffeine: cup of coffee in morning    Medication Adherence/Access: Patient currently receiving Humira through patient assistance program.  States she is set up to receive it for a year.    Seronegative  Erosive Rheumatoid Arthritis:   Humira 40 mg every 14 days  Acetaminophen 1500 mg twice daily    Patient reports she has used 3 doses of Humira and thinks it is working a little bit.  Hands are still swollen and sore, but better than what they used to be. She is using acetaminophen to treat the pain, but finding it isn't helping as much as she would like. No concerns with administration and no side effects reported.    Recent Labs   Lab Test 08/23/23  1533 10/27/22  0944   WBC 10.5 12.0*   RBC 4.89 4.47   HGB 13.6 13.1   HCT 43.1 39.0   MCV 88 87   RDW 14.5 14.8    334     CRP Inflammation   Date Value Ref Range Status   08/23/2023 8.07 (H) <5.00 mg/L Final     Vaccines:  Immunization History   Administered Date(s) Administered    COVID-19 MONOVALENT 12+ (Pfizer) 01/14/2021, 02/05/2021, 02/23/2022    Hepatitis A (ADULT 19+) 01/14/2020, 09/08/2020    Hepatitis B, Adult 09/17/2013, 11/14/2013, 06/03/2016    Influenza Vaccine 18-64 (Flublok) 10/27/2022    Influenza Vaccine >6 months (Alfuria,Fluzone) 10/11/2014, 10/26/2015, 11/10/2016, 11/04/2017, 11/01/2018, 11/08/2018, 11/07/2019, 11/03/2020, 11/02/2021    MMR 05/02/2008, 06/17/2008    Pneumococcal 20 valent Conjugate (Prevnar 20) 06/15/2023    Pneumococcal 23 valent 05/13/2011    TD,PF 7+ (Tenivac) 01/25/2018    TDAP (Adacel,Boostrix) 09/14/2007    Tdap (Adult) Unspecified Formulation 01/25/2018    Zoster recombinant adjuvanted (SHINGRIX) 12/13/2018, 01/14/2020     Patient reports that she plans to get flu shot, but will probably not get another COVID shot.      Patient declined to discuss other conditions at this visit and she is also seeing a primary care MTM pharmacist. States that everything else is going well.    Today's Vitals: LMP  (LMP Unknown)   ----------------    I spent 11 minutes with this patient today. All changes were made via collaborative practice agreement with Jessica Dahl. A copy of the visit note was provided to the patient's  provider(s).    A summary of these recommendations was sent via [employer].    Luis Antonio Dee, PharmD  Medication Therapy Management Pharmacist  [facility]  Phone: [phone]     Telemedicine Visit Details  Type of service:  Telephone visit  Start Time: 9:06 AM  End Time: 9:17 AM     Medication Therapy Recommendations  Rheumatoid arthritis involving multiple sites with positive rheumatoid factor (H)    Current Medication: acetaminophen (TYLENOL) 500 MG tablet (Discontinued)   Rationale: Dose too high - Dosage too high - Safety   Recommendation: Decrease Dose   Status: Accepted - no CPA Needed   Note: Recommend patient decrease dose to a max of 1000 mg acetaminophen per dose         Vaccine counseling    Rationale: Preventive therapy - Needs additional medication therapy - Indication   Recommendation: Provide Education   Status: Patient Agreed - Adherence/Education   Note: Recommend annual flu shot, patient declines COVID shot

## 2023-11-07 ENCOUNTER — VIRTUAL VISIT (OUTPATIENT)
Dept: RHEUMATOLOGY | Facility: CLINIC | Age: 63
End: 2023-11-07
Attending: PHYSICIAN ASSISTANT
Payer: COMMERCIAL

## 2023-11-07 DIAGNOSIS — Z71.85 VACCINE COUNSELING: ICD-10-CM

## 2023-11-07 DIAGNOSIS — M05.79 RHEUMATOID ARTHRITIS INVOLVING MULTIPLE SITES WITH POSITIVE RHEUMATOID FACTOR (H): Primary | ICD-10-CM

## 2023-11-07 RX ORDER — ACETAMINOPHEN 500 MG
1500 TABLET ORAL 2 TIMES DAILY
COMMUNITY
End: 2023-11-07

## 2023-11-07 NOTE — PATIENT INSTRUCTIONS
"Recommendations from today's MTM visit:                                                       Continue taking Humira as prescribed.  It can take up to 3 months to reach its full effect.  Don't take more than 1000 mg of acetaminophen per dose (2 extra strength tablets).  May take it up to 4 times per day for better pain control.  Consider receiving the following vaccination:  Annual flu    Follow-up: on 11/13/23 with Jessica Dahl.  Luis Antonio will reach out to schedule appointment after your visit with Jessiac.    It was great speaking with you today.  I value your experience and would be very thankful for your time in providing feedback in our clinic survey. In the next few days, you may receive an email or text message from [facility] [employer] with a link to a survey related to your  clinical pharmacist.\"     To schedule another MTM appointment, please call the clinic directly or you may call the MTM scheduling line at [phone] or toll-free at [phone].     My Clinical Pharmacist's contact information:                                                      Please feel free to contact me with any questions or concerns you have.      Luis Antonio Dee, PharmD  Medication Therapy Management Pharmacist  [facility]  Phone: [phone]    "

## 2023-11-07 NOTE — Clinical Note
Jessica,  Thank you for referring Rocio to see me today.  I will wait to see what you discuss with her at her next visit and schedule a follow-up based on your plan.  Luis Antonio

## 2023-11-07 NOTE — Clinical Note
11/7/2023       RE: Rocio Price  [address] [facility] [address]     Dear Colleague,    Thank you for referring your patient, Rocio Price, to the [facility] [place] at [facility]. Please see a copy of my visit note below.    Medication Therapy Management (MTM) Encounter    ASSESSMENT:                            Medication Adherence/Access: {adherencechoices:381978}    Seronegative Erosive Rheumatoid Arthritis:    Vaccines: Per ACIP guidelines, patient is eligible for: Covid-19 and Influenza        PLAN:                              Consider receiving the following vaccinations:  Annual flu  COVID    Follow-up: on 11/13/23 with Jessica Dahl ***    SUBJECTIVE/OBJECTIVE:                          Rocio Price is a 63 year old female called for {mtmvisit:355506}     Reason for visit: Humira follow-up.    Allergies/ADRs: Reviewed in chart  Past Medical History: Reviewed in chart  Tobacco: She reports that she has never smoked. She has never been exposed to tobacco smoke. She has never used smokeless tobacco.  Alcohol: ***  {Social and Goals:111584}    Medication Adherence/Access: {fumedadherence:321997}    Seronegative Erosive Rheumatoid Arthritis:   Humira 40 mg every 14 days  Acetaminophen 500-1000 mg every 6 hours    Recent Labs   Lab Test 08/23/23  1533 10/27/22  0944   WBC 10.5 12.0*   RBC 4.89 4.47   HGB 13.6 13.1   HCT 43.1 39.0   MCV 88 87   RDW 14.5 14.8    334     Erythrocyte Sedimentation Rate   Date Value Ref Range Status   08/23/2023 18 0 - 30 mm/hr Final      CRP Inflammation   Date Value Ref Range Status   08/23/2023 8.07 (H) <5.00 mg/L Final      Last Comprehensive Metabolic Panel:  Lab Results   Component Value Date     10/12/2023    POTASSIUM 4.3 10/12/2023    CHLORIDE 101 10/12/2023    CO2 27 10/12/2023    ANIONGAP 10 10/12/2023     (H) 10/12/2023    BUN 16.0 10/12/2023    CR  "0.80 10/12/2023    GFRESTIMATED 82 10/12/2023    EFREM 9.5 10/12/2023     Vaccines:  Immunization History   Administered Date(s) Administered    COVID-19 MONOVALENT 12+ (Pfizer) 01/14/2021, 02/05/2021, 02/23/2022    Hepatitis A (ADULT 19+) 01/14/2020, 09/08/2020    Hepatitis B, Adult 09/17/2013, 11/14/2013, 06/03/2016    Influenza Vaccine 18-64 (Flublok) 10/27/2022    Influenza Vaccine >6 months (Alfuria,Fluzone) 10/11/2014, 10/26/2015, 11/10/2016, 11/04/2017, 11/01/2018, 11/08/2018, 11/07/2019, 11/03/2020, 11/02/2021    MMR 05/02/2008, 06/17/2008    Pneumococcal 20 valent Conjugate (Prevnar 20) 06/15/2023    Pneumococcal 23 valent 05/13/2011    TD,PF 7+ (Tenivac) 01/25/2018    TDAP (Adacel,Boostrix) 09/14/2007    Tdap (Adult) Unspecified Formulation 01/25/2018    Zoster recombinant adjuvanted (SHINGRIX) 12/13/2018, 01/14/2020     Today's Vitals: LMP  (LMP Unknown)   ----------------    I spent {[facility] total time 3:983529} with this patient today. All changes were made via collaborative practice agreement with Jessica Dahl. A copy of the visit note was provided to the patient's provider(s).    A summary of these recommendations was sent via [employer].    Luis Antonio Dee, PharmD  Medication Therapy Management Pharmacist  [facility]  Phone: [phone]     Telemedicine Visit Details  Type of service:  {[facility]visit[facility]:596779::\"Telephone visit\"}  Start Time: {video/phone visit start time:152948}  End Time: {video/phone visit end time:152948}     Medication Therapy Recommendations  No medication therapy recommendations to display         Again, thank you for allowing me to participate in the care of your patient.      Sincerely,    Luis Antonio Dee [facility]    "

## 2023-11-08 NOTE — PROGRESS NOTES
Rheumatology Clinic Visit  [facility]  VINCE Burgess     Rocio Price MRN# [id number]   YOB: 1960 Age: 63 year old   Date of Visit: 11/13/2023  Primary care provider: Behzad Correa          Assessment and Plan:     1.  Seronegative erosive rheumatoid arthritis  2.  High-risk medication use    Patient presents today to for follow up for her seronegative erosive rheumatoid arthritis.  She started the Humira approximately 6 weeks ago.  She thinks there may be a slight improvement but does continue to have a lot of joint pain and swelling.  The second third and fourth MCPs on the right.  Some tenderness to palpation over the MCPs bilaterally.  Previous laboratory evaluations and imaging studies were reviewed, results below.      She does continue to show a lot of active inflammation.  At this time we will have her continue on the Humira but we will add methotrexate back in at 10 mg weekly.  We will check labs in 1 month.  She will follow-up with me in 3 months.    Plan:     Schedule follow-up with Jessica Dahl PA-C in 3 months.   Labs:  CBC, Creatinine, Albumin, AST, ALT, CRP and Sed Rate in 1 month  Medication recommendations:   Start Methotrexate 10mg (4 tabs) weekly.   WEEKLY. While on Methotrexate:  -check labs (ALT/AST, albumin, CBC with platelets and creatinine) every 3 months  -Limit alcohol to 2 drinks weekly  -Take Folic Acid 1mg daily   -Tylenol 500-1000mg can be used as needed up to three times daily for nausea/headache associated with dosing  # Methotrexate Risks and Benefits: The risks and benefits of methotrexate were discussed in detail and the patient verbalized understanding.  The risks discussed include, but are not limited to, the risk for hypersensitivity, anaphylaxis, anaphylactoid reactions, infections, bone marrow suppression, renal toxicity, hepatotoxicity, pulmonary toxicity, malignancy, impaired fertility, GI upset, alopecia, and oral and nasal  sores.  Folic acid supplementation is recommended during methotrexate therapy to help prevent some of the side effects. Pregnancy prevention and planning was discussed; it is recommended that women of childbearing potential use reliable contraception during therapy.  The risks of taking both methotrexate and alcohol were reviewed; complete alcohol avoidance was discussed.  Routine laboratory monitoring is required during methotrexate therapy. Taking MTX once weekly, all within a 24 hour period was stressed and the patient verbalized this instruction back to me.  I encouraged reviewing the package insert and asking any questions about the medication.    Continue Humira 40mg/0.4mL SubQ every 14 days.     VINCE Burgess  Rheumatology         History of Present Illness:   Rocio Price presents for evaluation of rheumatoid arthritis.    Rheumatological history:  Provider(s): Dr. Wallace  Pertinent lab history: HLA B27 positive, elevated Sed Rate and CRP, negative MYRON,  negative CCP and RF  MRI showed erosions and synovitis  Previous medications tried: Prednisone,  Methotrexate  Current medications: Humira    Interval history November 13, 2023:  She states that she continues to have joint pain and swelling. She states that she is hurting more around the left ankle on the medial aspect and the lateral aspect on the right.  The left hand has not been as bad, but the right hand throbs and hurts.     HPI from consult of August 23, 2023:  She was diagnosed in 2021. She was put on Methotrexate and has been off of that for about 6 months. She has been having increased pain and swelling. She states that her left hand has been getting worse. She did not feel the methotrexate was working for her. She was given Prednisone, which was slightly helpful. She states that [place] was discussing putting her on a different medication, but she cannot remember which medication it was. She gets pain from her right shoulder down the right  arm. She is unable to sleep on her right side. She finds that she will get numbness down the arm as well. The right elbow will get swollen, and the swelling and tenderness can go down the arm as well. No shortness of breath.     No history of infections. No history of MI's or blood clots. She does have high blood pressure and diabetes. She has congestive heart failure. She has a history of diverticulitis.                Review of Systems:     Constitutional: negative  Skin: negative  Eyes: negative  Ears/Nose/Throat: negative  Respiratory: No shortness of breath, dyspnea on exertion, cough, or hemoptysis  Cardiovascular: negative  Gastrointestinal: negative  Genitourinary: negative  Musculoskeletal: as above  Neurologic: negative  Psychiatric: negative  Hematologic/Lymphatic/Immunologic: negative  Endocrine: negative         Active Problem List:     Patient Active Problem List    Diagnosis Date Noted    Acute pain of both shoulders 10/23/2023     Priority: Medium    Atherosclerotic heart disease of native coronary artery without angina pectoris 10/27/2022     Priority: Medium     Formatting of this note might be different from the original.  moderate LAD stenosis by CT angiography      History of malignant neoplasm of breast 10/27/2022     Priority: Medium     Formatting of this note might be different from the original.  2013      History of squamous cell carcinoma of skin 10/27/2022     Priority: Medium    Rheumatoid arthritis involving multiple sites with positive rheumatoid factor (H) 10/27/2022     Priority: Medium    Moderate persistent asthma 02/04/2022     Priority: Medium     Last Assessment & Plan:   Formatting of this note might be different from the original.  Patient seems to be doing fairly well on her current regimen of Symbicort and albuterol.  Will continue to monitor.  Consider repeat PFTs further out from acute illness.      Depression 01/13/2022     Priority: Medium    Gastroesophageal reflux  disease 01/13/2022     Priority: Medium    Hyperlipidemia 01/13/2022     Priority: Medium    Migraine headache 01/12/2022     Priority: Medium     Formatting of this note might be different from the original.  Associated with photophobia. Treated with over-the-counter medications, no prescriptions.      History of severe acute respiratory syndrome coronavirus 2 (SARS-CoV-2) disease 11/19/2021     Priority: Medium    Neuropathy, peripheral 02/04/2020     Priority: Medium    Congestive heart failure (H) 12/19/2018     Priority: Medium     Formatting of this note might be different from the original.  preserved ejection fraction, EF 60% ECHO 12/14/18    Last Assessment & Plan:   Formatting of this note might be different from the original.  HFpEF, EF 60% on Echo 12/14/2018  Continue aspirin 81 mg  Continue metoprolol succinate 50mg  Continue furosemide 40 mg daily      Type 2 diabetes mellitus with hyperglycemia, with long-term current use of insulin (H) 08/28/2017     Priority: Medium     Formatting of this note is different from the original.  Current Meds: Aspart insulin 6-10 units with meals as needed. Insulin glargine 50 units at bedtime. Metformin 1000 mg BID.     [place] D 4/5 (A1c)  Blood pressure: /64 (BP Location: Right arm, Patient Position: Sitting, Cuff Size: Large)  prior: 100/65  Statin: Atorvastatin 40mg   Aspirin: 81 mg daily  Tobacco: non-smoker  A1c:   Lab Results   Component Value Date    HGBA1C 10.5 (H) 01/11/2022    HGBA1C 10.8 (H) 09/08/2020    HGBA1C 12.6 (H) 01/10/2020     Renal Health: Stage 2 (eGFR 60-89)  Lab Results   Component Value Date    EGFRNONBLKAA 75 01/13/2022     Lab Results   Component Value Date    UMCROALBCONC <5.0 08/25/2017    CREATININEUR 108 01/10/2020    ALBCREARATIO <5 01/10/2020         Last Assessment & Plan:   Formatting of this note might be different from the original.  She has made great improvement since last check a few weeks ago both in diet and activity  levels. This is greatly reflected in her blood sugar readings. I don't think we need to add additional medications at this time for diabetic control. Will recheck A1c in 2 months.      Morbid obesity (H) 04/14/2017     Priority: Medium    Female cystocele 06/03/2016     Priority: Medium    Obstructive sleep apnea syndrome in adult 11/11/2014     Priority: Medium     Last Assessment & Plan:   Formatting of this note might be different from the original.  Given evidence of significant CO2 retention despite improving respiratory symptoms and utilization of CPAP during hospitalization, will refer back to sleep medicine for consideration of adjustment of CPAP vs BiPAP.      Status post bilateral mastectomy 03/11/2014     Priority: Medium    Seasonal allergies 09/09/2013     Priority: Medium    Restless leg syndrome 11/17/2010     Priority: Medium    Sensorineural hearing loss (SNHL) of both ears 11/17/2010     Priority: Medium    Lichen sclerosus 09/14/2007     Priority: Medium            Past Medical History:   History reviewed. No pertinent past medical history.  Past Surgical History:   Procedure Laterality Date    HYSTERECTOMY, PAP NO LONGER INDICATED      HYSTERECTOMY, PAP NO LONGER INDICATED      MASTECTOMY, BILATERAL              Social History:     Social History     Socioeconomic History    Marital status:      Spouse name: Not on file    Number of children: Not on file    Years of education: Not on file    Highest education level: Not on file   Occupational History    Not on file   Tobacco Use    Smoking status: Never     Passive exposure: Never    Smokeless tobacco: Never   Substance and Sexual Activity    Alcohol use: Not Currently    Drug use: Never    Sexual activity: Not on file   Other Topics Concern    Not on file   Social History Narrative    Not on file     Social Determinants of Health     Financial Resource Strain: Unknown (10/11/2023)    Financial Resource Strain     Within the past 12  months, have you or your family members you live with been unable to get utilities (heat, electricity) when it was really needed?: Patient refused   Food Insecurity: Unknown (10/11/2023)    Food Insecurity     Within the past 12 months, did you worry that your food would run out before you got money to buy more?: Patient refused     Within the past 12 months, did the food you bought just not last and you didn t have money to get more?: Patient refused   Transportation Needs: Unknown (10/11/2023)    Transportation Needs     Within the past 12 months, has lack of transportation kept you from medical appointments, getting your medicines, non-medical meetings or appointments, work, or from getting things that you need?: Patient refused   Physical Activity: Not on file   Stress: Not on file   Social Connections: Not on file   Interpersonal Safety: Unknown (10/12/2023)    Interpersonal Safety     Do you feel physically and emotionally safe where you currently live?: Patient refused     Within the past 12 months, have you been hit, slapped, kicked or otherwise physically hurt by someone?: Patient refused     Within the past 12 months, have you been humiliated or emotionally abused in other ways by your partner or ex-partner?: Patient refused   Housing Stability: Unknown (10/11/2023)    Housing Stability     Do you have housing? : Patient refused     Are you worried about losing your housing?: Patient refused          Family History:   History reviewed. No pertinent family history.         Allergies:     Allergies   Allergen Reactions    Seasonal Allergies Other (See Comments)     Itchy watery eyes            Medications:     Current Outpatient Medications   Medication Sig Dispense Refill    acetaminophen (TYLENOL) 500 MG tablet Take 500-1,000 mg by mouth every 6 hours as needed for mild pain      adalimumab (HUMIRA *CF*) 40 MG/0.4ML pen kit Inject 0.4 mLs (40 mg) Subcutaneous every 14 days Hold for signs of infection, then  "seek medical attention. 1 each 5    aspirin (ASA) 81 MG EC tablet Take 1 tablet (81 mg) by mouth daily 90 tablet 3    atorvastatin (LIPITOR) 40 MG tablet Take 1 tablet (40 mg) by mouth daily 90 tablet 1    blood glucose (PRECISION QID TEST) test strip       budesonide-formoterol (SYMBICORT) 80-4.5 MCG/ACT Inhaler Inhale 1-2 puffs as needed. May use up to 12 puffs per day. 20.4 g 3    buPROPion (WELLBUTRIN SR) 150 MG 12 hr tablet Take 1 tablet (150 mg) by mouth daily 90 tablet 3    citalopram (CELEXA) 20 MG tablet Take 1 tablet (20 mg) by mouth daily 90 tablet 1    clobetasol (TEMOVATE) 0.05 % external ointment Apply topically 2 times daily as needed      Continuous Blood Gluc Sensor (FREESTYLE REYNOLD 3 SENSOR) MISC 1 each every 14 days 6 each 3    Continuous Blood Gluc Sensor (FREESTYLE REYNOLD 3 SENSOR) MISC 1 each every 14 days      dicyclomine (BENTYL) 10 MG capsule TAKE 1 CAPSULE BY MOUTH FOUR TIMES A DAY AS NEEDED FOR ABDOMINAL PAIN OR CRAMPS      folic acid (FOLVITE) 1 MG tablet Take 1 tablet (1 mg) by mouth daily 90 tablet 3    furosemide (LASIX) 20 MG tablet Take 1 tablet (20 mg) by mouth 2 times daily 180 tablet 1    gabapentin (NEURONTIN) 300 MG capsule Take 1 capsule (300 mg) by mouth 3 times daily 270 capsule 1    glucose-vitamin C 4-6 GM-MG CHEW Take 4 g by mouth      insulin aspart (NOVOLOG PEN) 100 UNIT/ML pen Inject 10 Units Subcutaneous 3 times daily (with meals) Or as directed up to 50 units per day. 30 mL 1    insulin glargine (LANTUS SOLOSTAR) 100 UNIT/ML pen Inject 40 Units Subcutaneous At Bedtime 15 mL 3    insulin syringe-needle U-100 (30G X 1/2\" 0.3 ML) 30G X 1/2\" 0.3 ML miscellaneous Use 4 syringes daily or as directed. For insulin. 360 each 11    insulin syringe-needle U-100 (30G X 1/2\" 0.3 ML) 30G X 1/2\" 0.3 ML miscellaneous Use 1 syringes weekly or as directed.for methotrexate 12 each 11    ipratropium - albuterol 0.5 mg/2.5 mg/3 mL (DUONEB) 0.5-2.5 (3) MG/3ML neb solution Inhale 1 vial (3 " "mLs) into the lungs every 6 hours as needed for shortness of breath, wheezing or cough 90 mL 3    loratadine-pseudoePHEDrine (CLARITIN-D 12-HOUR) 5-120 MG 12 hr tablet Take 1 tablet by mouth daily      melatonin 5 MG tablet Take 5 mg by mouth At Bedtime      metFORMIN (GLUCOPHAGE) 1000 MG tablet Take 1 tablet (1,000 mg) by mouth 2 times daily (with meals) 180 tablet 3    methotrexate 2.5 MG tablet Take 4 tablets (10 mg) by mouth every 7 days Labs every 8 - 12 weeks for refills. 48 tablet 0    metoprolol succinate ER (TOPROL XL) 50 MG 24 hr tablet Take 1 tablet (50 mg) by mouth At Bedtime 90 tablet 1    montelukast (SINGULAIR) 10 MG tablet Take 1 tablet (10 mg) by mouth At Bedtime 90 tablet 0    pantoprazole (PROTONIX) 40 MG EC tablet Take 1 tablet (40 mg) by mouth daily 90 tablet 1    pramipexole (MIRAPEX) 0.125 MG tablet Take 2 tabs in afternoon and two tabs in  tablet 1    sodium chloride (NEBUSAL) 3 % neb solution Inhale 4 mLs into the lungs      valsartan (DIOVAN) 160 MG tablet Take 1 tablet (160 mg) by mouth daily 90 tablet 0            Physical Exam:   Blood pressure (!) 173/84, pulse 65, resp. rate 18, height 1.549 m (5' 1\"), weight 117.9 kg (260 lb), SpO2 98%.  Wt Readings from Last 6 Encounters:   11/10/23 117.9 kg (260 lb)   10/12/23 115.7 kg (255 lb)   10/12/23 115.9 kg (255 lb 9.6 oz)   10/10/23 115.9 kg (255 lb 9.6 oz)   08/23/23 111.7 kg (246 lb 3.2 oz)   07/03/23 109.4 kg (241 lb 1.6 oz)     Constitutional: well-developed, appearing stated age; cooperative  Eyes: nl conjunctiva, sclera  ENT: nl external ears, nose, hearing, lips,   Neck: no mass or thyroid enlargement  Resp: No shortness of breath with normal conversation  MS: Active synovitis over multiple MCPs and PIPs bilaterally with associated tenderness, worse on the right.  Improved synovitis on the MCPs on the left.  Skin: no nail pitting, alopecia, rash, nodules or lesions.   Psych: nl judgement, orientation, memory, affect.           " Data:   Imagin2022 MR Hand Right without and with IV contrast  IMPRESSION:  1. Although there is a background of mild scattered degenerative arthritis in the right hand and  wrist, there are erosions in the 2nd and 3rd metacarpal heads and diffuse enhancing synovitis including the MCP joints, out of proportion to degenerative changes. Findings suggest a superimposed inflammatory process.  2. Mild tenosynovial enhancement involving the flexor and extensor tendons of the hand and wrist, which may also be inflammatory.  3. Nonspecific enlargement and T2 hyperintensity of the median nerve with enhancement; this can be  seen in the setting of carpal tunnel syndrome.    2022   DX ANKLE BILATERAL 3+ VIEWS  IMPRESSION:  Calcaneal spurs. Slight degenerative arthritis both ankles. No erosions. Postoperative  changes bilateral 5th metatarsals.     DX Hand Bilateral 2 Views  IMPRESSION:  Mild scattered degenerative arthritis of both hands. No erosions.     2022 CT Chest without IV contrast  IMPRESSION:  Interval improvement in the previously new bilateral upper lung predominant groundglass opacities with a couple of areas mild ill-defined residual groundglass, likely due to a resolving infectious/inflammatory process. Interval resolution of the previously new solid nodular consolidative opacities in the right lower lobe with improvement in right lower lobe endobronchial  plugging, also compatible with an infectious/inflammatory etiology. Tiny indeterminate 4 mm posterior right upper lobe ground glass nodule which is unchanged from 2022, but new since 2019    Cardiac:  2022 TTE   Final Impressions  1. Mildly enlarged left ventricular chamber size, no regional wall motion abnormalities, calculated 2-D four chamber monoplane volumetric ejection fraction 60%.  2. Normal right ventricular chamber size, normal systolic function, estimated right ventricular systolic pressure 48 mmHg (right  atrial pressure of 10 mmHg).  3. Mildly thickened aortic valve with trivial AR  4. Tiny posterior pericardial effusion.    Noted RVSP of 48.    12/14/2018 Stress Test Echo  Positive for septal and apical myocardial ischemia    12/10/2018: CT Cardiac Angiogram triple rule out with IV contrast  IMPRESSION:   1. Mild pulmonary edema, and additional right perihilar patchy opacities. These  could represent infectious/inflammatory etiology or possibly asymmetric edema.  2. 1.3 cm semisolid nodule in the right upper lung. This is likely secondary to  #1 above, recommend follow-up in 3-6 months to confirm resolution.  3. Moderate stenosis of the proximal LAD. CAD-RADS category 3. Normal systolic  LV function with no regional wall motion abnormalities.  4. Negative for acute pulmonary embolism.    04/05/2022 ECG: QTc 441, p-mitrale     11/18/2022 bone density scan  IMPRESSION: NORMAL. Bone mineral density measurements are within normal limits using T score.     Laboratory:  10/27/2022  Creatinine 0.70, GFR greater than 90  Hemoglobin A1c 11.2  TSH 1.91  White blood cell count 12.0, hemoglobin 13.1, platelet count 334    6/15/2023  Creatinine 0.74, GFR 90  Hemoglobin A1c 9.9    8/23/2023  Creatinine 1.08, GFR 57  ALT 26, AST 26  CRP 8.07  White blood cell count 10.5, hemoglobin 13.6, platelet count 343  TB negative  Hepatitis B and hepatitis C nonreactive

## 2023-11-10 ASSESSMENT — PAIN SCALES - GENERAL: PAINLEVEL: SEVERE PAIN (7)

## 2023-11-13 ENCOUNTER — OFFICE VISIT (OUTPATIENT)
Dept: RHEUMATOLOGY | Facility: CLINIC | Age: 63
End: 2023-11-13
Payer: COMMERCIAL

## 2023-11-13 ENCOUNTER — MYC MEDICAL ADVICE (OUTPATIENT)
Dept: CARDIOLOGY | Facility: CLINIC | Age: 63
End: 2023-11-13

## 2023-11-13 VITALS
HEIGHT: 61 IN | SYSTOLIC BLOOD PRESSURE: 173 MMHG | WEIGHT: 260 LBS | HEART RATE: 65 BPM | OXYGEN SATURATION: 98 % | DIASTOLIC BLOOD PRESSURE: 84 MMHG | BODY MASS INDEX: 49.09 KG/M2 | RESPIRATION RATE: 18 BRPM

## 2023-11-13 DIAGNOSIS — M06.00 SERONEGATIVE EROSIVE RHEUMATOID ARTHRITIS (H): Primary | ICD-10-CM

## 2023-11-13 DIAGNOSIS — Z79.899 HIGH RISK MEDICATION USE: ICD-10-CM

## 2023-11-13 PROCEDURE — 99214 OFFICE O/P EST MOD 30 MIN: CPT | Performed by: PHYSICIAN ASSISTANT

## 2023-11-13 RX ORDER — FOLIC ACID 1 MG/1
1 TABLET ORAL DAILY
Qty: 90 TABLET | Refills: 3 | Status: SHIPPED | OUTPATIENT
Start: 2023-11-13 | End: 2024-07-16

## 2023-11-13 RX ORDER — METHOTREXATE 2.5 MG/1
10 TABLET ORAL
Qty: 48 TABLET | Refills: 0 | Status: SHIPPED | OUTPATIENT
Start: 2023-11-13 | End: 2024-01-05

## 2023-11-13 NOTE — PATIENT INSTRUCTIONS
After Visit Instructions:     Thank you for coming to [facility] for your care. It is my goal to partner with you to help you reach your optimal state of health.       Plan:     Schedule follow-up with Jessica Dahl PA-C in 3 months.   Labs:  CBC, Creatinine, Albumin, AST, ALT, CRP and Sed Rate in 1 month  Medication recommendations:   Start Methotrexate 10mg (4 tabs) weekly.   WEEKLY. While on Methotrexate:  -check labs (ALT/AST, albumin, CBC with platelets and creatinine) every 3 months  -Limit alcohol to 2 drinks weekly  -Take Folic Acid 1mg daily   -Tylenol 500-1000mg can be used as needed up to three times daily for nausea/headache associated with dosing  # Methotrexate Risks and Benefits: The risks and benefits of methotrexate were discussed in detail and the patient verbalized understanding.  The risks discussed include, but are not limited to, the risk for hypersensitivity, anaphylaxis, anaphylactoid reactions, infections, bone marrow suppression, renal toxicity, hepatotoxicity, pulmonary toxicity, malignancy, impaired fertility, GI upset, alopecia, and oral and nasal sores.  Folic acid supplementation is recommended during methotrexate therapy to help prevent some of the side effects. Pregnancy prevention and planning was discussed; it is recommended that women of childbearing potential use reliable contraception during therapy.  The risks of taking both methotrexate and alcohol were reviewed; complete alcohol avoidance was discussed.  Routine laboratory monitoring is required during methotrexate therapy. Taking MTX once weekly, all within a 24 hour period was stressed and the patient verbalized this instruction back to me.  I encouraged reviewing the package insert and asking any questions about the medication.    Continue Humira 40mg/0.4mL SubQ every 14 days.           Jessica Dahl PA-C  [facility]  [facility]    Contact information: [facility]  [facility]  Clinic Number:  [phone]  Please call or send a [employer] message with any questions about your care

## 2023-11-15 NOTE — TELEPHONE ENCOUNTER
Patient returned call and left voicemail message with update blood pressure reading.      Today's Blood Pressure: 156/81  Location: Home BP    Patient reported blood pressure updated in Epic.     Erin Garcia LPN

## 2023-11-29 NOTE — PROGRESS NOTES
DISCHARGE  Reason for Discharge: Patient has failed to schedule further appointments. Pt last seen 10/23/23 for PT evaluation.     Discharge Plan: Patient to continue home program as started at [facility].    Referring Provider:  John Sky

## 2023-11-30 DIAGNOSIS — Z79.4 TYPE 2 DIABETES MELLITUS WITH HYPERGLYCEMIA, WITH LONG-TERM CURRENT USE OF INSULIN (H): ICD-10-CM

## 2023-11-30 DIAGNOSIS — I50.9 CONGESTIVE HEART FAILURE, UNSPECIFIED HF CHRONICITY, UNSPECIFIED HEART FAILURE TYPE (H): ICD-10-CM

## 2023-11-30 DIAGNOSIS — G25.81 RESTLESS LEGS SYNDROME (RLS): ICD-10-CM

## 2023-11-30 DIAGNOSIS — E11.65 TYPE 2 DIABETES MELLITUS WITH HYPERGLYCEMIA, WITH LONG-TERM CURRENT USE OF INSULIN (H): ICD-10-CM

## 2023-11-30 RX ORDER — INSULIN GLARGINE 100 [IU]/ML
INJECTION, SOLUTION SUBCUTANEOUS
Qty: 15 ML | Refills: 2 | Status: SHIPPED | OUTPATIENT
Start: 2023-11-30

## 2023-11-30 RX ORDER — VALSARTAN 160 MG/1
160 TABLET ORAL DAILY
Qty: 90 TABLET | Refills: 0 | Status: SHIPPED | OUTPATIENT
Start: 2023-11-30 | End: 2024-02-27

## 2023-11-30 RX ORDER — PRAMIPEXOLE DIHYDROCHLORIDE 0.12 MG/1
TABLET ORAL
Qty: 360 TABLET | Refills: 0 | Status: SHIPPED | OUTPATIENT
Start: 2023-11-30 | End: 2024-03-05

## 2023-12-06 DIAGNOSIS — I50.9 CONGESTIVE HEART FAILURE, UNSPECIFIED HF CHRONICITY, UNSPECIFIED HEART FAILURE TYPE (H): ICD-10-CM

## 2023-12-06 RX ORDER — FUROSEMIDE 20 MG
20 TABLET ORAL 2 TIMES DAILY
Qty: 180 TABLET | Refills: 0 | Status: SHIPPED | OUTPATIENT
Start: 2023-12-06 | End: 2024-03-04

## 2023-12-06 RX ORDER — METOPROLOL SUCCINATE 50 MG/1
50 TABLET, EXTENDED RELEASE ORAL AT BEDTIME
Qty: 90 TABLET | Refills: 0 | Status: SHIPPED | OUTPATIENT
Start: 2023-12-06 | End: 2024-03-04

## 2023-12-15 DIAGNOSIS — G62.9 PERIPHERAL POLYNEUROPATHY: ICD-10-CM

## 2023-12-15 RX ORDER — GABAPENTIN 300 MG/1
300 CAPSULE ORAL 3 TIMES DAILY
Qty: 270 CAPSULE | Refills: 0 | Status: SHIPPED | OUTPATIENT
Start: 2023-12-15 | End: 2024-03-05

## 2023-12-19 DIAGNOSIS — E11.65 TYPE 2 DIABETES MELLITUS WITH HYPERGLYCEMIA, WITH LONG-TERM CURRENT USE OF INSULIN (H): ICD-10-CM

## 2023-12-19 DIAGNOSIS — Z79.4 TYPE 2 DIABETES MELLITUS WITH HYPERGLYCEMIA, WITH LONG-TERM CURRENT USE OF INSULIN (H): ICD-10-CM

## 2023-12-19 DIAGNOSIS — F33.0 MILD EPISODE OF RECURRENT MAJOR DEPRESSIVE DISORDER (H): ICD-10-CM

## 2023-12-19 RX ORDER — INSULIN ASPART 100 [IU]/ML
10 INJECTION, SOLUTION INTRAVENOUS; SUBCUTANEOUS
Qty: 15 ML | Refills: 3 | Status: SHIPPED | OUTPATIENT
Start: 2023-12-19

## 2023-12-19 RX ORDER — CITALOPRAM HYDROBROMIDE 20 MG/1
20 TABLET ORAL DAILY
Qty: 90 TABLET | Refills: 0 | Status: SHIPPED | OUTPATIENT
Start: 2023-12-19 | End: 2024-03-05

## 2023-12-29 DIAGNOSIS — E78.5 HYPERLIPIDEMIA LDL GOAL <100: ICD-10-CM

## 2023-12-29 RX ORDER — ATORVASTATIN CALCIUM 40 MG/1
40 TABLET, FILM COATED ORAL DAILY
Qty: 30 TABLET | Refills: 0 | Status: SHIPPED | OUTPATIENT
Start: 2023-12-29 | End: 2024-02-02

## 2024-01-04 NOTE — PROGRESS NOTES
Medication Therapy Management (MTM) Encounter    ASSESSMENT:                            Medication Adherence/Access: No issues identified    Seronegative erosive rheumatoid arthritis: Recommend patient discuss therapy options at upcoming appointment with rheumatology provider as current therapy does not appear to be controlling symptoms. Recommend patient continue with subcutaneous injection of methotrexate at dose of 10 mg once weekly as there is more consistent absorption with the injectable vs the oral formulation which could provide better control. Patient is due for toxicity monitoring, recommend patient complete labs before leaving on trip. Provided education on acetaminophen dosing and recommended patient use 1000 mg up to four times daily for more consistent pain control and better safety.    Vaccines: Per ACIP guidelines, patient is eligible for: Covid-19 and Influenza. Patient declines COVID.    PLAN:                            Schedule lab only appointment  Consider receiving your annual flu vaccine  Luis Antonio will clarify concentration of methotrexate solution with [employer] and send a MyChart with the correct amount to administer. - confirmed concentration is 50 mg/2 mL and sent MyChart message to patient letting her know to administer 0.4 mL once weekly.  Don't take more than 1000 mg of acetaminophen per dose (2 extra strength tablets).  May take it up to 4 times per day for better pain control.    Follow-up: Return in 3 months (on 4/5/2024) for Follow up, with me, using a phone visit.    SUBJECTIVE/OBJECTIVE:                          Rocio Price is a 63 year old female called for a follow-up visit from 11/7/2023. She was referred to me from Jessica Dahl PA-C.       Reason for visit: Humira and methotrexate follow up.    Allergies/ADRs: Reviewed in chart  Past Medical History: Reviewed in chart  Tobacco: She reports that she has never smoked. She has never been exposed to tobacco smoke. She has never  used smokeless tobacco.  Alcohol: very seldom  Caffeine: cup of coffee in morning    Medication Adherence/Access: Patient currently receiving Humira through patient assistance program.  States she has a good supply.    Seronegative erosive rheumatoid arthritis:   Humira 40 mg every 14 days  Methotrexate 10 mg by mouth once a week.  Picked up old prescription for injectable methotrexate and injected 0.8 mL (20 mg) on Wednesday.  Folic acid 1 mg daily  Acetaminophen 1000 mg twice daily AM and late afternoon. Has occasionally used 1500 mg per dose, but less than she was previously using.    Patient reports she is still swollen and sore. Some days are better than others with this morning being a not good day. She was sick all last week with diarrhea, upset stomach, and fatigue. Feeling better and with more energy today. Leaves for [place] next Friday and will be there for a few weeks. Reports no side effects or concerns.    CBC RESULTS:   Recent Labs   Lab Test 08/23/23  1533   WBC 10.5   RBC 4.89   HGB 13.6   HCT 43.1   MCV 88   MCH 27.8   MCHC 31.6   RDW 14.5        Last Comprehensive Metabolic Panel:  Lab Results   Component Value Date     10/12/2023    POTASSIUM 4.3 10/12/2023    CHLORIDE 101 10/12/2023    CO2 27 10/12/2023    ANIONGAP 10 10/12/2023     (H) 10/12/2023    BUN 16.0 10/12/2023    CR 0.80 10/12/2023    GFRESTIMATED 82 10/12/2023    EFREM 9.5 10/12/2023     Liver Function Studies -   Recent Labs   Lab Test 08/23/23  1533   ALBUMIN 4.3   AST 26   ALT 26     Vaccines:  Immunization History   Administered Date(s) Administered    COVID-19 MONOVALENT 12+ (Pfizer) 01/14/2021, 02/05/2021, 02/23/2022    Hepatitis A (ADULT 19+) 01/14/2020, 09/08/2020    Hepatitis B, Adult 09/17/2013, 11/14/2013, 06/03/2016    Influenza Vaccine 18-64 (Flublok) 10/27/2022    Influenza Vaccine >6 months,quad, PF 10/11/2014, 10/26/2015, 11/10/2016, 11/04/2017, 11/01/2018, 11/08/2018, 11/07/2019, 11/03/2020,  11/02/2021    MMR 05/02/2008, 06/17/2008    Pneumococcal 20 valent Conjugate (Prevnar 20) 06/15/2023    Pneumococcal 23 valent 05/13/2011    TD,PF 7+ (Tenivac) 01/25/2018    TDAP (Adacel,Boostrix) 09/14/2007    Tdap (Adult) Unspecified Formulation 01/25/2018    Zoster recombinant adjuvanted (SHINGRIX) 12/13/2018, 01/14/2020     Patient reports that she plans to get flu shot, but will probably not get another COVID shot.      Patient declined to discuss other conditions at this visit and she is also seeing a primary care [facility] pharmacist. States that everything else is going well.     Today's Vitals: LMP  (LMP Unknown)   ----------------    I spent 21 minutes with this patient today. All changes were made via collaborative practice agreement with Jessica Dahl. A copy of the visit note was provided to the patient's provider(s).    A summary of these recommendations was sent via [employer].    Lius Antonio Dee, PharmD  Medication Therapy Management Pharmacist  [facility]  Phone: [phone]     Telemedicine Visit Details  Type of service:  Telephone visit  Start Time: 11:06 AM  End Time: 11:27 AM     Medication Therapy Recommendations  Rheumatoid arthritis involving multiple sites with positive rheumatoid factor (H)    Current Medication: Methotrexate Sodium 250 MG/10ML SOLN   Rationale: Dose too high - Dosage too high - Safety   Recommendation: Decrease Dose   Status: Patient Agreed - Adherence/Education

## 2024-01-05 ENCOUNTER — VIRTUAL VISIT (OUTPATIENT)
Dept: PALLIATIVE MEDICINE | Facility: OTHER | Age: 64
End: 2024-01-05
Attending: PHYSICIAN ASSISTANT
Payer: COMMERCIAL

## 2024-01-05 DIAGNOSIS — Z71.85 VACCINE COUNSELING: ICD-10-CM

## 2024-01-05 DIAGNOSIS — M05.79 RHEUMATOID ARTHRITIS INVOLVING MULTIPLE SITES WITH POSITIVE RHEUMATOID FACTOR (H): Primary | ICD-10-CM

## 2024-01-05 RX ORDER — NEEDLES, SAFETY 22GX1 1/2"
1 NEEDLE, DISPOSABLE MISCELLANEOUS WEEKLY
Qty: 12 EACH | Refills: 0 | Status: SHIPPED | OUTPATIENT
Start: 2024-01-05

## 2024-01-05 NOTE — PATIENT INSTRUCTIONS
"Recommendations from today's MTM visit:                                                       Schedule lab only appointment  Consider receiving your annual flu vaccine  Luis Antonio will clarify concentration of methotrexate solution with [employer] and send a MyChart with the correct amount to administer. - confirmed concentration is 50 mg/2 mL and sent MyChart message to patient letting her know to administer 0.4 mL once weekly.  Don't take more than 1000 mg of acetaminophen per dose (2 extra strength tablets).  May take it up to 4 times per day for better pain control.    Follow-up: Return in 3 months (on 4/5/2024) for Follow up, with me, using a phone visit.    It was great speaking with you today.  I value your experience and would be very thankful for your time in providing feedback in our clinic survey. In the next few days, you may receive an email or text message from [employer] with a link to a survey related to your  clinical pharmacist.\"     To schedule another MTM appointment, please call the clinic directly or you may call the MTM scheduling line at [phone] or toll-free at [phone].     My Clinical Pharmacist's contact information:                                                      Please feel free to contact me with any questions or concerns you have.      Luis Antonio Dee, PharmD  Medication Therapy Management Pharmacist  [facility]  Phone: [phone]    "

## 2024-01-05 NOTE — Clinical Note
Rocio Lopez said she picked up an old prescription for the methotrexate injections and administered 20 mg this past Wednesday. I asked her to decrease the dose to 10 mg once weekly until she sees you. I put in orders for the injection and syringe/needle for co-sign to be sent to [facility] in [place]. If you are ok with the change, please sign them. If you'd rather her be on the tablets, let me know and I'll notify patient.  Luis Antonio

## 2024-02-02 DIAGNOSIS — E78.5 HYPERLIPIDEMIA LDL GOAL <100: ICD-10-CM

## 2024-02-02 RX ORDER — ATORVASTATIN CALCIUM 40 MG/1
TABLET, FILM COATED ORAL
Qty: 30 TABLET | Refills: 0 | Status: SHIPPED | OUTPATIENT
Start: 2024-02-02 | End: 2024-02-27

## 2024-02-06 DIAGNOSIS — M06.00 SERONEGATIVE EROSIVE RHEUMATOID ARTHRITIS (H): ICD-10-CM

## 2024-02-06 NOTE — PROGRESS NOTES
Rocio is a 64 year old who is being evaluated via a billable video visit.      How would you like to obtain your AVS? [employer]  If the video visit is dropped, the invitation should be resent by: Text to cell phone: [phone]  Will anyone else be joining your video visit? No  Will you be in [place] for the visit?  Yes   Pt will enter visit via [employer]  Video-Visit Details    Type of service:  Video Visit     Originating Location (pt. Location): Home    Distant Location (provider location):  On-site  Platform used for Video Visit: [facility]      Rheumatology Clinic Visit  [facility]  VINCE Burgess     Rocio Price MRN# [id number]   YOB: 1960 Age: 64 year old   Date of Visit: 2/12/2024  Primary care provider: Behzad Correa          Assessment and Plan:     1.  Seronegative erosive rheumatoid arthritis  2.  High-risk medication use    Patient presents today to for follow up for her seronegative erosive rheumatoid arthritis.  She does think that the adding methotrexate to Humira has been helpful.  She does still have some swelling in her hands.  She is also noticing increased pain and occasional swelling in her knees.  Will get x-rays of her knees.  Will increase her methotrexate to 0.6 mL weekly.  She will get labs this week.  Follow-up with me in 3 months.      Plan:     Schedule follow-up with Jessica Dahl PA-C in 3 months.   Labs:  CBC, Creatinine, Albumin, AST, ALT, CRP and Sed Rate this week  Imaging: xray bilateral knee  Medication recommendations:   Increase Methotrexate 15mg (0.6mL SubQ) weekly.   WEEKLY. While on Methotrexate:  -check labs (ALT/AST, albumin, CBC with platelets and creatinine) every 3 months  -Limit alcohol to 2 drinks weekly  -Take Folic Acid 1mg daily   -Tylenol 500-1000mg can be used as needed up to three times daily for nausea/headache associated with dosing    Continue Humira 40mg/0.4mL SubQ every 14 days.     Jessica Dahl  PAC  Rheumatology         History of Present Illness:   Rocio Price presents for evaluation of rheumatoid arthritis.    Rheumatological history:  Provider(s): Dr. Wallace  Pertinent lab history: HLA B27 positive, elevated Sed Rate and CRP, negative MYRON,  negative CCP and RF  MRI showed erosions and synovitis  Previous medications tried: Prednisone,  Methotrexate  Current medications: Humira    Interval history February 12, 2024:   She states that overall things have been a little bit better. Most of her arthritis has been in her right hand. She states that her left hand is a little worse, but is better with restarting the Methotrexate. She states that she has been noticing increased pain in her knees. At times she does have swelling as well. She has been recommended in the past to get xrays and was told that she has arthritis. She continues to have swelling in her fingers as well.    Interval history November 13, 2023:  She states that she continues to have joint pain and swelling. She states that she is hurting more around the left ankle on the medial aspect and the lateral aspect on the right.  The left hand has not been as bad, but the right hand throbs and hurts.     HPI from consult of August 23, 2023:  She was diagnosed in 2021. She was put on Methotrexate and has been off of that for about 6 months. She has been having increased pain and swelling. She states that her left hand has been getting worse. She did not feel the methotrexate was working for her. She was given Prednisone, which was slightly helpful. She states that [facility] was discussing putting her on a different medication, but she cannot remember which medication it was. She gets pain from her right shoulder down the right arm. She is unable to sleep on her right side. She finds that she will get numbness down the arm as well. The right elbow will get swollen, and the swelling and tenderness can go down the arm as well. No shortness of breath.      No history of infections. No history of MI's or blood clots. She does have high blood pressure and diabetes. She has congestive heart failure. She has a history of diverticulitis.                Review of Systems:     Constitutional: negative  Skin: negative  Eyes: negative  Ears/Nose/Throat: negative  Respiratory: No shortness of breath, dyspnea on exertion, cough, or hemoptysis  Cardiovascular: negative  Gastrointestinal: negative  Genitourinary: negative  Musculoskeletal: as above  Neurologic: negative  Psychiatric: negative  Hematologic/Lymphatic/Immunologic: negative  Endocrine: negative         Active Problem List:     Patient Active Problem List    Diagnosis Date Noted    Acute pain of both shoulders 10/23/2023     Priority: Medium    Atherosclerotic heart disease of native coronary artery without angina pectoris 10/27/2022     Priority: Medium     Formatting of this note might be different from the original.  moderate LAD stenosis by CT angiography      History of malignant neoplasm of breast 10/27/2022     Priority: Medium     Formatting of this note might be different from the original.  2013      History of squamous cell carcinoma of skin 10/27/2022     Priority: Medium    Rheumatoid arthritis involving multiple sites with positive rheumatoid factor (H) 10/27/2022     Priority: Medium    Moderate persistent asthma 02/04/2022     Priority: Medium     Last Assessment & Plan:   Formatting of this note might be different from the original.  Patient seems to be doing fairly well on her current regimen of Symbicort and albuterol.  Will continue to monitor.  Consider repeat PFTs further out from acute illness.      Depression 01/13/2022     Priority: Medium    Gastroesophageal reflux disease 01/13/2022     Priority: Medium    Hyperlipidemia 01/13/2022     Priority: Medium    Migraine headache 01/12/2022     Priority: Medium     Formatting of this note might be different from the original.  Associated with  photophobia. Treated with over-the-counter medications, no prescriptions.      History of severe acute respiratory syndrome coronavirus 2 (SARS-CoV-2) disease 11/19/2021     Priority: Medium    Neuropathy, peripheral 02/04/2020     Priority: Medium    Congestive heart failure (H) 12/19/2018     Priority: Medium     Formatting of this note might be different from the original.  preserved ejection fraction, EF 60% ECHO 12/14/18    Last Assessment & Plan:   Formatting of this note might be different from the original.  HFpEF, EF 60% on Echo 12/14/2018  Continue aspirin 81 mg  Continue metoprolol succinate 50mg  Continue furosemide 40 mg daily      Type 2 diabetes mellitus with hyperglycemia, with long-term current use of insulin (H) 08/28/2017     Priority: Medium     Formatting of this note is different from the original.  Current Meds: Aspart insulin 6-10 units with meals as needed. Insulin glargine 50 units at bedtime. Metformin 1000 mg BID.     [place] D 4/5 (A1c)  Blood pressure: /64 (BP Location: Right arm, Patient Position: Sitting, Cuff Size: Large)  prior: 100/65  Statin: Atorvastatin 40mg   Aspirin: 81 mg daily  Tobacco: non-smoker  A1c:   Lab Results   Component Value Date    HGBA1C 10.5 (H) 01/11/2022    HGBA1C 10.8 (H) 09/08/2020    HGBA1C 12.6 (H) 01/10/2020     Renal Health: Stage 2 (eGFR 60-89)  Lab Results   Component Value Date    EGFRNONBLKAA 75 01/13/2022     Lab Results   Component Value Date    UMCROALBCONC <5.0 08/25/2017    CREATININEUR 108 01/10/2020    ALBCREARATIO <5 01/10/2020         Last Assessment & Plan:   Formatting of this note might be different from the original.  She has made great improvement since last check a few weeks ago both in diet and activity levels. This is greatly reflected in her blood sugar readings. I don't think we need to add additional medications at this time for diabetic control. Will recheck A1c in 2 months.      Morbid obesity (H) 04/14/2017      Priority: Medium    Female cystocele 06/03/2016     Priority: Medium    Obstructive sleep apnea syndrome in adult 11/11/2014     Priority: Medium     Last Assessment & Plan:   Formatting of this note might be different from the original.  Given evidence of significant CO2 retention despite improving respiratory symptoms and utilization of CPAP during hospitalization, will refer back to sleep medicine for consideration of adjustment of CPAP vs BiPAP.      Status post bilateral mastectomy 03/11/2014     Priority: Medium    Seasonal allergies 09/09/2013     Priority: Medium    Restless leg syndrome 11/17/2010     Priority: Medium    Sensorineural hearing loss (SNHL) of both ears 11/17/2010     Priority: Medium    Lichen sclerosus 09/14/2007     Priority: Medium            Past Medical History:   History reviewed. No pertinent past medical history.  Past Surgical History:   Procedure Laterality Date    HYSTERECTOMY, PAP NO LONGER INDICATED      HYSTERECTOMY, PAP NO LONGER INDICATED      MASTECTOMY, BILATERAL              Social History:     Social History     Socioeconomic History    Marital status:      Spouse name: Not on file    Number of children: Not on file    Years of education: Not on file    Highest education level: Not on file   Occupational History    Not on file   Tobacco Use    Smoking status: Never     Passive exposure: Never    Smokeless tobacco: Never   Substance and Sexual Activity    Alcohol use: Not Currently    Drug use: Never    Sexual activity: Not on file   Other Topics Concern    Not on file   Social History Narrative    Not on file     Social Determinants of Health     Financial Resource Strain: Unknown (10/11/2023)    Financial Resource Strain     Within the past 12 months, have you or your family members you live with been unable to get utilities (heat, electricity) when it was really needed?: Patient refused   Food Insecurity: Unknown (10/11/2023)    Food Insecurity     Within the past  12 months, did you worry that your food would run out before you got money to buy more?: Patient refused     Within the past 12 months, did the food you bought just not last and you didn t have money to get more?: Patient refused   Transportation Needs: Unknown (10/11/2023)    Transportation Needs     Within the past 12 months, has lack of transportation kept you from medical appointments, getting your medicines, non-medical meetings or appointments, work, or from getting things that you need?: Patient refused   Physical Activity: Not on file   Stress: Not on file   Social Connections: Not on file   Interpersonal Safety: Unknown (10/12/2023)    Interpersonal Safety     Do you feel physically and emotionally safe where you currently live?: Patient refused     Within the past 12 months, have you been hit, slapped, kicked or otherwise physically hurt by someone?: Patient refused     Within the past 12 months, have you been humiliated or emotionally abused in other ways by your partner or ex-partner?: Patient refused   Housing Stability: Unknown (10/11/2023)    Housing Stability     Do you have housing? : Patient refused     Are you worried about losing your housing?: Patient refused          Family History:   History reviewed. No pertinent family history.         Allergies:     Allergies   Allergen Reactions    Seasonal Allergies Other (See Comments)     Itchy watery eyes            Medications:     Current Outpatient Medications   Medication Sig Dispense Refill    acetaminophen (TYLENOL) 500 MG tablet Take 500-1,000 mg by mouth every 6 hours as needed for mild pain      adalimumab (HUMIRA *CF*) 40 MG/0.4ML pen kit Inject 0.4 mLs (40 mg) Subcutaneous every 14 days Hold for signs of infection, then seek medical attention. 1 each 5    aspirin (ASA) 81 MG EC tablet Take 1 tablet (81 mg) by mouth daily 90 tablet 3    atorvastatin (LIPITOR) 40 MG tablet TAKE ONE TABLET BY MOUTH EVERY DAY , DUE FOR APPOINTMENT, WHIT KRISHNAN MD  "30 tablet 0    blood glucose (PRECISION QID TEST) test strip       budesonide-formoterol (SYMBICORT) 80-4.5 MCG/ACT Inhaler Inhale 1-2 puffs as needed. May use up to 12 puffs per day. 20.4 g 3    buPROPion (WELLBUTRIN SR) 150 MG 12 hr tablet Take 1 tablet (150 mg) by mouth daily 90 tablet 3    citalopram (CELEXA) 20 MG tablet TAKE ONE TABLET BY MOUTH EVERY DAY 90 tablet 0    clobetasol (TEMOVATE) 0.05 % external ointment Apply topically 2 times daily as needed      Continuous Blood Gluc Sensor (FREESTYLE REYNOLD 3 SENSOR) MISC 1 each every 14 days 6 each 3    Continuous Blood Gluc Sensor (FREESTYLE REYNOLD 3 SENSOR) MISC 1 each every 14 days      dicyclomine (BENTYL) 10 MG capsule TAKE 1 CAPSULE BY MOUTH FOUR TIMES A DAY AS NEEDED FOR ABDOMINAL PAIN OR CRAMPS      folic acid (FOLVITE) 1 MG tablet Take 1 tablet (1 mg) by mouth daily 90 tablet 3    furosemide (LASIX) 20 MG tablet TAKE ONE TABLET BY MOUTH TWICE DAILY 180 tablet 0    gabapentin (NEURONTIN) 300 MG capsule TAKE ONE CAPSULE BY MOUTH THREE TIMES DAILY 270 capsule 0    glucose-vitamin C 4-6 GM-MG CHEW Take 4 g by mouth      insulin aspart (NOVOLOG FLEXPEN RELION) 100 UNIT/ML pen Inject 10 Units Subcutaneous 3 times daily (with meals) 15 mL 3    insulin syringe-needle U-100 (30G X 1/2\" 0.3 ML) 30G X 1/2\" 0.3 ML miscellaneous Use 4 syringes daily or as directed. For insulin. 360 each 11    ipratropium - albuterol 0.5 mg/2.5 mg/3 mL (DUONEB) 0.5-2.5 (3) MG/3ML neb solution Inhale 1 vial (3 mLs) into the lungs every 6 hours as needed for shortness of breath, wheezing or cough 90 mL 3    LANTUS SOLOSTAR 100 UNIT/ML soln INJECT 40 UNITS UNDER THE SKIN ONCE DAILY AT BEDTIME 15 mL 2    loratadine-pseudoePHEDrine (CLARITIN-D 12-HOUR) 5-120 MG 12 hr tablet Take 1 tablet by mouth daily      melatonin 5 MG tablet Take 5 mg by mouth At Bedtime      metFORMIN (GLUCOPHAGE) 1000 MG tablet Take 1 tablet (1,000 mg) by mouth 2 times daily (with meals) 180 tablet 3    Methotrexate " "Sodium 250 MG/10ML SOLN Inject 0.6 mLs Subcutaneous once a week 7.2 mL 0    metoprolol succinate ER (TOPROL XL) 50 MG 24 hr tablet TAKE 1 TABLET BY MOUTH NIGHTLY AT BEDTIME 90 tablet 0    montelukast (SINGULAIR) 10 MG tablet Take 1 tablet (10 mg) by mouth At Bedtime 90 tablet 0    pantoprazole (PROTONIX) 40 MG EC tablet Take 1 tablet (40 mg) by mouth daily 90 tablet 1    pramipexole (MIRAPEX) 0.125 MG tablet TAKE TWO TABLETS BY MOUTH EVERY DAY IN THE AFTERNOON & TAKE TWO TABLETS BY MOUTH EVERY EVENING 360 tablet 0    sodium chloride (NEBUSAL) 3 % neb solution Inhale 4 mLs into the lungs      syringe/needle, sisp, (B-D SYRINGE/NEEDLE) 25G X 5/8\" 1 ML MISC 1 each once a week To inject methotrexate 12 each 0    valsartan (DIOVAN) 160 MG tablet Take 1 tablet by mouth once daily 90 tablet 0            Physical Exam:   Height 1.549 m (5' 1\"), weight 117.9 kg (260 lb).  Wt Readings from Last 6 Encounters:   24 117.9 kg (260 lb)   11/10/23 117.9 kg (260 lb)   10/12/23 115.7 kg (255 lb)   10/12/23 115.9 kg (255 lb 9.6 oz)   10/10/23 115.9 kg (255 lb 9.6 oz)   23 111.7 kg (246 lb 3.2 oz)     Constitutional: well-developed, appearing stated age; cooperative  Eyes: nl conjunctiva, sclera  ENT: nl external ears, nose, hearing, lips,   Neck: no mass or thyroid enlargement  Resp: No shortness of breath with normal conversation  Skin: no nail pitting, alopecia, rash, nodules or lesions.   Psych: nl judgement, orientation, memory, affect.           Data:   Imagin2022 MR Hand Right without and with IV contrast  IMPRESSION:  1. Although there is a background of mild scattered degenerative arthritis in the right hand and  wrist, there are erosions in the 2nd and 3rd metacarpal heads and diffuse enhancing synovitis including the MCP joints, out of proportion to degenerative changes. Findings suggest a superimposed inflammatory process.  2. Mild tenosynovial enhancement involving the flexor and extensor tendons of " the hand and wrist, which may also be inflammatory.  3. Nonspecific enlargement and T2 hyperintensity of the median nerve with enhancement; this can be  seen in the setting of carpal tunnel syndrome.    04/26/2022   DX ANKLE BILATERAL 3+ VIEWS  IMPRESSION:  Calcaneal spurs. Slight degenerative arthritis both ankles. No erosions. Postoperative  changes bilateral 5th metatarsals.     DX Hand Bilateral 2 Views  IMPRESSION:  Mild scattered degenerative arthritis of both hands. No erosions.     03/30/2022 CT Chest without IV contrast  IMPRESSION:  Interval improvement in the previously new bilateral upper lung predominant groundglass opacities with a couple of areas mild ill-defined residual groundglass, likely due to a resolving infectious/inflammatory process. Interval resolution of the previously new solid nodular consolidative opacities in the right lower lobe with improvement in right lower lobe endobronchial  plugging, also compatible with an infectious/inflammatory etiology. Tiny indeterminate 4 mm posterior right upper lobe ground glass nodule which is unchanged from 02/12/2022, but new since 03/19/2019    Cardiac:  07/01/2022 TTE   Final Impressions  1. Mildly enlarged left ventricular chamber size, no regional wall motion abnormalities, calculated 2-D four chamber monoplane volumetric ejection fraction 60%.  2. Normal right ventricular chamber size, normal systolic function, estimated right ventricular systolic pressure 48 mmHg (right atrial pressure of 10 mmHg).  3. Mildly thickened aortic valve with trivial AR  4. Tiny posterior pericardial effusion.    Noted RVSP of 48.    12/14/2018 Stress Test Echo  Positive for septal and apical myocardial ischemia    12/10/2018: CT Cardiac Angiogram triple rule out with IV contrast  IMPRESSION:   1. Mild pulmonary edema, and additional right perihilar patchy opacities. These  could represent infectious/inflammatory etiology or possibly asymmetric edema.  2. 1.3 cm  semisolid nodule in the right upper lung. This is likely secondary to  #1 above, recommend follow-up in 3-6 months to confirm resolution.  3. Moderate stenosis of the proximal LAD. CAD-RADS category 3. Normal systolic  LV function with no regional wall motion abnormalities.  4. Negative for acute pulmonary embolism.    04/05/2022 ECG: QTc 441, p-mitrale     11/18/2022 bone density scan  IMPRESSION: NORMAL. Bone mineral density measurements are within normal limits using T score.     Laboratory:  10/27/2022  Creatinine 0.70, GFR greater than 90  Hemoglobin A1c 11.2  TSH 1.91  White blood cell count 12.0, hemoglobin 13.1, platelet count 334    6/15/2023  Creatinine 0.74, GFR 90  Hemoglobin A1c 9.9    8/23/2023  Creatinine 1.08, GFR 57  ALT 26, AST 26  CRP 8.07  White blood cell count 10.5, hemoglobin 13.6, platelet count 343  TB negative  Hepatitis B and hepatitis C nonreactive    10/12/2023  Creatinine 0.80

## 2024-02-12 ENCOUNTER — VIRTUAL VISIT (OUTPATIENT)
Dept: RHEUMATOLOGY | Facility: CLINIC | Age: 64
End: 2024-02-12
Payer: COMMERCIAL

## 2024-02-12 VITALS — HEIGHT: 61 IN | WEIGHT: 260 LBS | BODY MASS INDEX: 49.09 KG/M2

## 2024-02-12 DIAGNOSIS — M06.00 SERONEGATIVE EROSIVE RHEUMATOID ARTHRITIS (H): Primary | ICD-10-CM

## 2024-02-12 DIAGNOSIS — Z79.899 HIGH RISK MEDICATION USE: ICD-10-CM

## 2024-02-12 PROCEDURE — 99214 OFFICE O/P EST MOD 30 MIN: CPT | Mod: 95 | Performed by: PHYSICIAN ASSISTANT

## 2024-02-12 ASSESSMENT — PAIN SCALES - GENERAL: PAINLEVEL: MILD PAIN (3)

## 2024-02-12 NOTE — PATIENT INSTRUCTIONS
SHANI Herrera Visit Instructions:     Thank you for coming to [facility] for your care. It is my goal to partner with you to help you reach your optimal state of health.       Plan:     Schedule follow-up with Jessica Dahl PA-C in 3 months.   Labs:  CBC, Creatinine, Albumin, AST, ALT, CRP and Sed Rate this week  Imaging: xray bilateral knee  Medication recommendations:   Increase Methotrexate 15mg (0.6mL SubQ) weekly.   WEEKLY. While on Methotrexate:  -check labs (ALT/AST, albumin, CBC with platelets and creatinine) every 3 months  -Limit alcohol to 2 drinks weekly  -Take Folic Acid 1mg daily   -Tylenol 500-1000mg can be used as needed up to three times daily for nausea/headache associated with dosing    Continue Humira 40mg/0.4mL SubQ every 14 days.           Jessica Dahl PA-C  [facility]  [facility]    Contact information: [facility]  Clinic Number:  [phone]  Please call or send a [employer] message with any questions about your care

## 2024-02-15 ENCOUNTER — ANCILLARY PROCEDURE (OUTPATIENT)
Dept: GENERAL RADIOLOGY | Facility: CLINIC | Age: 64
End: 2024-02-15
Attending: PHYSICIAN ASSISTANT
Payer: COMMERCIAL

## 2024-02-15 ENCOUNTER — LAB (OUTPATIENT)
Dept: LAB | Facility: CLINIC | Age: 64
End: 2024-02-15
Payer: COMMERCIAL

## 2024-02-15 DIAGNOSIS — Z79.899 HIGH RISK MEDICATION USE: ICD-10-CM

## 2024-02-15 DIAGNOSIS — M06.00 SERONEGATIVE EROSIVE RHEUMATOID ARTHRITIS (H): ICD-10-CM

## 2024-02-15 LAB
ALBUMIN SERPL BCG-MCNC: 4.3 G/DL (ref 3.5–5.2)
ALT SERPL W P-5'-P-CCNC: 33 U/L (ref 0–50)
AST SERPL W P-5'-P-CCNC: 34 U/L (ref 0–45)
CREAT SERPL-MCNC: 0.85 MG/DL (ref 0.51–0.95)
CRP SERPL-MCNC: 3.77 MG/L
EGFRCR SERPLBLD CKD-EPI 2021: 76 ML/MIN/1.73M2
ERYTHROCYTE [DISTWIDTH] IN BLOOD BY AUTOMATED COUNT: 16.8 % (ref 10–15)
ERYTHROCYTE [SEDIMENTATION RATE] IN BLOOD BY WESTERGREN METHOD: 15 MM/HR (ref 0–30)
HCT VFR BLD AUTO: 44.8 % (ref 35–47)
HGB BLD-MCNC: 14.6 G/DL (ref 11.7–15.7)
MCH RBC QN AUTO: 29 PG (ref 26.5–33)
MCHC RBC AUTO-ENTMCNC: 32.6 G/DL (ref 31.5–36.5)
MCV RBC AUTO: 89 FL (ref 78–100)
PLATELET # BLD AUTO: 326 10E3/UL (ref 150–450)
RBC # BLD AUTO: 5.04 10E6/UL (ref 3.8–5.2)
WBC # BLD AUTO: 8.3 10E3/UL (ref 4–11)

## 2024-02-15 PROCEDURE — 73560 X-RAY EXAM OF KNEE 1 OR 2: CPT | Mod: TC | Performed by: RADIOLOGY

## 2024-02-15 PROCEDURE — 86140 C-REACTIVE PROTEIN: CPT

## 2024-02-15 PROCEDURE — 85027 COMPLETE CBC AUTOMATED: CPT

## 2024-02-15 PROCEDURE — 82565 ASSAY OF CREATININE: CPT

## 2024-02-15 PROCEDURE — 82040 ASSAY OF SERUM ALBUMIN: CPT

## 2024-02-15 PROCEDURE — 85652 RBC SED RATE AUTOMATED: CPT

## 2024-02-15 PROCEDURE — 84460 ALANINE AMINO (ALT) (SGPT): CPT

## 2024-02-15 PROCEDURE — 36415 COLL VENOUS BLD VENIPUNCTURE: CPT

## 2024-02-15 PROCEDURE — 84450 TRANSFERASE (AST) (SGOT): CPT

## 2024-02-20 ENCOUNTER — MYC MEDICAL ADVICE (OUTPATIENT)
Dept: FAMILY MEDICINE | Facility: CLINIC | Age: 64
End: 2024-02-20

## 2024-02-20 ENCOUNTER — MYC MEDICAL ADVICE (OUTPATIENT)
Dept: RHEUMATOLOGY | Facility: CLINIC | Age: 64
End: 2024-02-20

## 2024-02-20 DIAGNOSIS — M17.0 PRIMARY OSTEOARTHRITIS OF BOTH KNEES: Primary | ICD-10-CM

## 2024-02-21 NOTE — PROGRESS NOTES
ASSESSMENT & PLAN       Today we discussed the underlying etiology/pathology of patient's   1. Primary osteoarthritis of both knees    2. Rheumatoid arthritis involving multiple sites with positive rheumatoid factor (H)    3. Type 2 diabetes mellitus with hyperglycemia, with long-term current use of insulin (H)    4. Morbid obesity (H)      - -We discussed patient's bilateral knee pain and how it is related to osteoarthritis as well as her rheumatoid arthritis and the fact that these are different conditions.  Discussed the underlying etiologies and progression of both conditions today.  - We discussed the etiology of osteoarthritis as well as various treatment options for the mainstay of management of osteoarthritis including rest, activity modification, proper technique with ambulation of steps/stairs which is to lead with good leg going up and lead with painful/symptomatic leg going down,  maintaining healthy weight management due to the 1:4 rule for joint stress with excess weight, herbal supplementation such as turmeric to decrease inflammation , healthy nutrition, use of oral NSAIDs (if not contraindicated due to patient being on chronic blood thinner medication or other medical conditions) with supplemental Tylenol up to 3000 mg daily, topical pain relievers such as Voltaren 1% gel to be applied 3 times a day to the area of pain, consideration for intra-articular cortisone injections, viscosupplementation or PRP injections, formal physical therapy for joint mobilization and strengthening and possible surgical consideration if all conservative treatments fail.   -At this time we will proceed with bilateral cortisone injections for both knees.  Patient understood that she will have elevation of her glucose with her diabetes and will utilize her insulin sliding scale appropriately.  - Patient also will be referred to physical therapy for knee osteoarthritis/strengthening program.  - Patient to continue under the  "care of rheumatology for her rheumatoid arthritis with DM ARDS  -Patient should work on weight loss strategies doing low impact exercises such as pool/aquatic therapy or stationary bicycle would be an excellent option.      -Call direct clinic number [[phone]] at any time with questions or concerns in regards to your recent office visit with me.     Indra Heck PA-C  [facility]    1. Primary osteoarthritis of both knees    2. Rheumatoid arthritis involving multiple sites with positive rheumatoid factor (H)    3. Type 2 diabetes mellitus with hyperglycemia, with long-term current use of insulin (H)      Steroid injection of the bilateral knee was performed today in clinic, 2/23/2024 by Indra Heck PA-C  Time spent today with patient was separate/exclusive from performing the associated procedure.       Patient instructions after cortisone injection:   - Do not soak in a hot tub, bath or swimming pool for 48 hours to minimize risk of infection to injection site  - Ok to shower for daily cleaning  - Apply ice today to injection site for 10-15 minutes up to 3 times a day and only do your normal amounts of activity to prevent increased inflammation and pain for the next few days  - The lidocaine, \"numbing medicine\" (what is giving you pain relief right now) will likely wear off in 1-2 hours.  Once the lidocaine wears off you may note symptoms similar to before you received the injection and this is normal and expected. The corticosteroid which is designed to give prolonged relief of your symptoms will not reach maximum effect/relief until approximately 1-2 weeks from date of injection.   -In a small percentage of people, cortisone can cause flushing/redness in the face. This usually lasts for 1-3 days and resolves. Cool compress to the face, low dose benadryl and Ibuprofen/Tylenol can help if this happens.  -If you are diabetic you will notice increased blood sugar levels for up to " "a week due to your cortisone injection. Adjust your insulin sliding scale to correct your blood sugar levels.  If you are diabetic and take only oral/pill glycemic control medications  please continue with your normal daily dosing.  If any concerns in regards to your blood sugars or diabetic medications please address this with your primary care provider managing your diabetes.    -Rarely for a small percentage of patients, the area injected can become more painful and difficult to move for 24-36 hours after your injection due to a local inflammation response to the injection.  This is called a \"cortisone flare\".   It will resolve over a couple days on its own.  Rest, gentle motion of the joint, Tylenol, NSAID's (ibuprofen, Aleve) and ice to the area are all helpful to minimize discomfort.  -Cortisone injections can be repeated anytime after 4-6 months if your pain returns but should only be repeated if you are symptomatic and not to be done for preventative reasons.   -Proceeding with cortisone injection will delay surgical consideration such as total joint arthroplasty/joint replacement for the joint that was injected for a minimum of 4 months from the date of cortisone injection.    Follow up for repeat assessment and possible repeat injection if pain returns.           SUBJECTIVE  Rocio Price is a/an 64 year old female who is seen in consultation at the request of  Jessica Dahl PA-C for evaluation of bilateral knee pain. The patient is seen by themselves.    Onset: several years(s) ago. Reports insidious onset without acute precipitating event. Patient reports difficulty with stairs and walking occasionally.  Location of Pain: bilateral knee - medial aspect bilaterally, occasional lateral pain on the R knee; no radiation of pain; numbness/tingling from neuropathy  Rating of Pain at worst: 8/10  Rating of Pain Currently: 4/10  Worsened by: weather changing, stairs, long periods of walking, sitting, " standing from chair, difficulty getting in/out of vehicle  Better with: mild with treatments tried  Treatments tried: Tylenol and IcyHot, ice, heat, CSI 20+ years ago  Quality: constantly aching, dull, occasional sharp, stabbing  Associated symptoms: swelling, numbness, tingling, weakness of the knees/legs with walking, and feeling of instability  Orthopedic history: YES - Date: 20+ years ago - bilateral meniscus tears  Relevant surgical history: YES - Date: 20+ years ago - bilateral meniscus tears  Social history: social history: retired; no recreational activities    No past medical history on file.  Social History     Socioeconomic History    Marital status:    Tobacco Use    Smoking status: Never     Passive exposure: Never    Smokeless tobacco: Never   Substance and Sexual Activity    Alcohol use: Not Currently    Drug use: Never     Social Determinants of Health     Financial Resource Strain: Unknown (10/11/2023)    Financial Resource Strain     Within the past 12 months, have you or your family members you live with been unable to get utilities (heat, electricity) when it was really needed?: Patient refused   Food Insecurity: Unknown (10/11/2023)    Food Insecurity     Within the past 12 months, did you worry that your food would run out before you got money to buy more?: Patient refused     Within the past 12 months, did the food you bought just not last and you didn t have money to get more?: Patient refused   Transportation Needs: Unknown (10/11/2023)    Transportation Needs     Within the past 12 months, has lack of transportation kept you from medical appointments, getting your medicines, non-medical meetings or appointments, work, or from getting things that you need?: Patient refused   Interpersonal Safety: Unknown (10/12/2023)    Interpersonal Safety     Do you feel physically and emotionally safe where you currently live?: Patient refused     Within the past 12 months, have you been hit,  slapped, kicked or otherwise physically hurt by someone?: Patient refused     Within the past 12 months, have you been humiliated or emotionally abused in other ways by your partner or ex-partner?: Patient refused   Housing Stability: Unknown (10/11/2023)    Housing Stability     Do you have housing? : Patient refused     Are you worried about losing your housing?: Patient refused         Patient's past medical, surgical, social, and family histories were personally reviewed today and no changes are noted.    REVIEW OF SYSTEMS:  10 point ROS is negative other than symptoms noted above in HPI, Past Medical History or as stated below  Constitutional: NEGATIVE for fever, chills, change in weight  Skin: NEGATIVE for worrisome rashes, moles or lesions  GI/: NEGATIVE for bowel or bladder changes  Neuro: NEGATIVE for weakness, dizziness or paresthesias    OBJECTIVE:  Vital signs as noted in EPIC for 2/21/2024  General: healthy, alert and in no distress  HEENT: no scleral icterus or conjunctival erythema  Skin: no suspicious lesions or rash. No jaundice.  CV: no pedal edema  Resp: normal respiratory effort without conversational dyspnea   Psych: normal mood and affect  Gait: normal steady gait with appropriate coordination and balance  Neuro: Normal light sensory exam of lower extremity      MSK:  Exam shows a pleasant 64-year-old female who ablates weightbearing without assistive device.  BMI 49.  Patient is alert and oriented x 3.  Examination of both knees shows no significant bruising, ecchymotic change.  Mild knee effusion with synovitis noted bilaterally.  Patient has small superficial varicosities below the knees of both lower extremities.  Patient has full knee extension bilaterally and flexion within normal limits past 120 degrees.  There is some crepitation.  Patient is diffusely tender along the medial joint line of both knees with no particular pain laterally on the left and mild pain on the right.  Ligament  exam is stable with no significant alignment abnormality.  Circumduction maneuvers negative.  Patient has full active and passive range of motion of hips without pain or discomfort.  Motor tone is 5 out of 5 in both lower extremity motor groups including quadriceps, hamstrings and hip flexors.  No excessive warmth of the knees.  No significant peripheral edema.  Homans' sign is negative.  No pain in the popliteal fossa.        Independent visualization of the below image:  2 view previous x-rays of bilateral knees and sunrise merchant view obtained of both knees today show significant grade 3 medial compartment osteoarthritis with early degenerative spurring.  No evidence of fracture or dislocation.  Merchant view shows degenerative changes of the patellofemoral joint without bone-on-bone contact with early degenerative spurring.    KNEE ONE-TWO VIEWS BILATERAL  2/15/2024 11:28 AM      HISTORY: High risk medication use; Seronegative erosive rheumatoid  arthritis (H)  COMPARISON: None.                                                                    IMPRESSION:      Right knee: No acute fracture or malalignment. Moderate medial  compartment joint space narrowing. Mild lateral and patellofemoral  compartment marginal bony spurring. No significant knee joint  effusion.     Left knee: No acute fracture or malalignment. Mild tricompartmental  degenerative changes. Minimal knee joint effusion.     KOLE JAMES MD    Large Joint Injection/Arthocentesis: bilateral knee    Date/Time: 2/23/2024 2:29 PM    Performed by: Indra Heck PA-C  Authorized by: Indra Heck PA-C    Indications:  Pain, osteoarthritis and joint swelling  Needle Size:  22 G  Guidance: landmark guided    Approach:  Anterolateral  Location:  Knee  Laterality:  Bilateral      Medications (Right):  40 mg triamcinolone 40 MG/ML; 2 mL BUPivacaine 0.5 %; 4 mL lidocaine 1 %  Medications (Left):  40 mg triamcinolone 40 MG/ML; 2 mL BUPivacaine  0.5 %; 4 mL lidocaine 1 %  Outcome:  Tolerated well, no immediate complications  Procedure discussed: discussed risks, benefits, and alternatives    Consent Given by:  Patient  Timeout: timeout called immediately prior to procedure    Prep: patient was prepped and draped in usual sterile fashion          Patient's conditions were thoroughly discussed during today's visit with total time reviewing patient's previous medical records/history/radiology, face-to-face examination and discussion and plan of care with the patient and documentation being 35 minutes.    Indra Heck PA-C  [facility]    This note was completed in part using a voice recognition software, any grammatical or context distortion are unintentional and inherent to the software.

## 2024-02-22 NOTE — TELEPHONE ENCOUNTER
Patient was advised to hold farxiga in September due to elevated Scr. No instructions to restart. Sent her mychart to clarify when she restarted. Will await for clarification.

## 2024-02-22 NOTE — TELEPHONE ENCOUNTER
Behzad Correa PA-C and Lorraine Sky RPH-    See pt's Mychart message. Farxiga is not active on medication list.     Routed to PCP and MTM    Filomena BOJORQUEZ RN   PAL (Patient Advocate Liaison)  [facility]  [phone]

## 2024-02-23 ENCOUNTER — ANCILLARY PROCEDURE (OUTPATIENT)
Dept: GENERAL RADIOLOGY | Facility: CLINIC | Age: 64
End: 2024-02-23
Attending: PHYSICIAN ASSISTANT
Payer: COMMERCIAL

## 2024-02-23 ENCOUNTER — OFFICE VISIT (OUTPATIENT)
Dept: ORTHOPEDICS | Facility: CLINIC | Age: 64
End: 2024-02-23
Attending: PHYSICIAN ASSISTANT
Payer: COMMERCIAL

## 2024-02-23 VITALS
DIASTOLIC BLOOD PRESSURE: 75 MMHG | BODY MASS INDEX: 49.09 KG/M2 | HEIGHT: 61 IN | SYSTOLIC BLOOD PRESSURE: 125 MMHG | WEIGHT: 260 LBS

## 2024-02-23 DIAGNOSIS — Z79.4 TYPE 2 DIABETES MELLITUS WITH HYPERGLYCEMIA, WITH LONG-TERM CURRENT USE OF INSULIN (H): ICD-10-CM

## 2024-02-23 DIAGNOSIS — E66.01 MORBID OBESITY (H): ICD-10-CM

## 2024-02-23 DIAGNOSIS — M05.79 RHEUMATOID ARTHRITIS INVOLVING MULTIPLE SITES WITH POSITIVE RHEUMATOID FACTOR (H): ICD-10-CM

## 2024-02-23 DIAGNOSIS — M17.0 PRIMARY OSTEOARTHRITIS OF BOTH KNEES: ICD-10-CM

## 2024-02-23 DIAGNOSIS — E11.65 TYPE 2 DIABETES MELLITUS WITH HYPERGLYCEMIA, WITH LONG-TERM CURRENT USE OF INSULIN (H): ICD-10-CM

## 2024-02-23 DIAGNOSIS — M17.0 PRIMARY OSTEOARTHRITIS OF BOTH KNEES: Primary | ICD-10-CM

## 2024-02-23 PROCEDURE — 20610 DRAIN/INJ JOINT/BURSA W/O US: CPT | Mod: 50 | Performed by: PHYSICIAN ASSISTANT

## 2024-02-23 PROCEDURE — 99203 OFFICE O/P NEW LOW 30 MIN: CPT | Mod: 25 | Performed by: PHYSICIAN ASSISTANT

## 2024-02-23 PROCEDURE — 73560 X-RAY EXAM OF KNEE 1 OR 2: CPT | Mod: TC | Performed by: RADIOLOGY

## 2024-02-23 RX ORDER — BUPIVACAINE HYDROCHLORIDE 5 MG/ML
2 INJECTION, SOLUTION PERINEURAL
Status: SHIPPED | OUTPATIENT
Start: 2024-02-23

## 2024-02-23 RX ORDER — TRIAMCINOLONE ACETONIDE 40 MG/ML
40 INJECTION, SUSPENSION INTRA-ARTICULAR; INTRAMUSCULAR
Status: SHIPPED | OUTPATIENT
Start: 2024-02-23

## 2024-02-23 RX ORDER — LIDOCAINE HYDROCHLORIDE 10 MG/ML
4 INJECTION, SOLUTION INFILTRATION; PERINEURAL
Status: SHIPPED | OUTPATIENT
Start: 2024-02-23

## 2024-02-23 RX ADMIN — BUPIVACAINE HYDROCHLORIDE 2 ML: 5 INJECTION, SOLUTION PERINEURAL at 14:29

## 2024-02-23 RX ADMIN — LIDOCAINE HYDROCHLORIDE 4 ML: 10 INJECTION, SOLUTION INFILTRATION; PERINEURAL at 14:29

## 2024-02-23 RX ADMIN — TRIAMCINOLONE ACETONIDE 40 MG: 40 INJECTION, SUSPENSION INTRA-ARTICULAR; INTRAMUSCULAR at 14:29

## 2024-02-23 NOTE — LETTER
2/23/2024         RE: Rocio Price  [address] [facility] [address]        Dear Colleague,    Thank you for referring your patient, Rocio Price, to the [facility] [place]. Please see a copy of my visit note below.    ASSESSMENT & PLAN       Today we discussed the underlying etiology/pathology of patient's   1. Primary osteoarthritis of both knees    2. Rheumatoid arthritis involving multiple sites with positive rheumatoid factor (H)    3. Type 2 diabetes mellitus with hyperglycemia, with long-term current use of insulin (H)    4. Morbid obesity (H)      - -We discussed patient's bilateral knee pain and how it is related to osteoarthritis as well as her rheumatoid arthritis and the fact that these are different conditions.  Discussed the underlying etiologies and progression of both conditions today.  - We discussed the etiology of osteoarthritis as well as various treatment options for the mainstay of management of osteoarthritis including rest, activity modification, proper technique with ambulation of steps/stairs which is to lead with good leg going up and lead with painful/symptomatic leg going down,  maintaining healthy weight management due to the 1:4 rule for joint stress with excess weight, herbal supplementation such as turmeric to decrease inflammation , healthy nutrition, use of oral NSAIDs (if not contraindicated due to patient being on chronic blood thinner medication or other medical conditions) with supplemental Tylenol up to 3000 mg daily, topical pain relievers such as Voltaren 1% gel to be applied 3 times a day to the area of pain, consideration for intra-articular cortisone injections, viscosupplementation or PRP injections, formal physical therapy for joint mobilization and strengthening and possible surgical consideration if all conservative treatments fail.   -At this time we will proceed with bilateral cortisone injections for both  "knees.  Patient understood that she will have elevation of her glucose with her diabetes and will utilize her insulin sliding scale appropriately.  - Patient also will be referred to physical therapy for knee osteoarthritis/strengthening program.  - Patient to continue under the care of rheumatology for her rheumatoid arthritis with DM ARDS  -Patient should work on weight loss strategies doing low impact exercises such as pool/aquatic therapy or stationary bicycle would be an excellent option.      -Call direct clinic number [[phone]] at any time with questions or concerns in regards to your recent office visit with me.     Indra Heck PA-C  [facility]    1. Primary osteoarthritis of both knees    2. Rheumatoid arthritis involving multiple sites with positive rheumatoid factor (H)    3. Type 2 diabetes mellitus with hyperglycemia, with long-term current use of insulin (H)      Steroid injection of the bilateral knee was performed today in clinic, 2/23/2024 by Indra Heck PA-C  Time spent today with patient was separate/exclusive from performing the associated procedure.       Patient instructions after cortisone injection:   - Do not soak in a hot tub, bath or swimming pool for 48 hours to minimize risk of infection to injection site  - Ok to shower for daily cleaning  - Apply ice today to injection site for 10-15 minutes up to 3 times a day and only do your normal amounts of activity to prevent increased inflammation and pain for the next few days  - The lidocaine, \"numbing medicine\" (what is giving you pain relief right now) will likely wear off in 1-2 hours.  Once the lidocaine wears off you may note symptoms similar to before you received the injection and this is normal and expected. The corticosteroid which is designed to give prolonged relief of your symptoms will not reach maximum effect/relief until approximately 1-2 weeks from date of injection.   -In a small " "percentage of people, cortisone can cause flushing/redness in the face. This usually lasts for 1-3 days and resolves. Cool compress to the face, low dose benadryl and Ibuprofen/Tylenol can help if this happens.  -If you are diabetic you will notice increased blood sugar levels for up to a week due to your cortisone injection. Adjust your insulin sliding scale to correct your blood sugar levels.  If you are diabetic and take only oral/pill glycemic control medications  please continue with your normal daily dosing.  If any concerns in regards to your blood sugars or diabetic medications please address this with your primary care provider managing your diabetes.    -Rarely for a small percentage of patients, the area injected can become more painful and difficult to move for 24-36 hours after your injection due to a local inflammation response to the injection.  This is called a \"cortisone flare\".   It will resolve over a couple days on its own.  Rest, gentle motion of the joint, Tylenol, NSAID's (ibuprofen, Aleve) and ice to the area are all helpful to minimize discomfort.  -Cortisone injections can be repeated anytime after 4-6 months if your pain returns but should only be repeated if you are symptomatic and not to be done for preventative reasons.   -Proceeding with cortisone injection will delay surgical consideration such as total joint arthroplasty/joint replacement for the joint that was injected for a minimum of 4 months from the date of cortisone injection.    Follow up for repeat assessment and possible repeat injection if pain returns.           SUBJECTIVE  Rocio A Mensing is a/an 64 year old female who is seen in consultation at the request of  Jessica Dahl PA-C for evaluation of bilateral knee pain. The patient is seen by themselves.    Onset: several years(s) ago. Reports insidious onset without acute precipitating event. Patient reports difficulty with stairs and walking " occasionally.  Location of Pain: bilateral knee - medial aspect bilaterally, occasional lateral pain on the R knee; no radiation of pain; numbness/tingling from neuropathy  Rating of Pain at worst: 8/10  Rating of Pain Currently: 4/10  Worsened by: weather changing, stairs, long periods of walking, sitting, standing from chair, difficulty getting in/out of vehicle  Better with: mild with treatments tried  Treatments tried: Tylenol and IcyHot, ice, heat, CSI 20+ years ago  Quality: constantly aching, dull, occasional sharp, stabbing  Associated symptoms: swelling, numbness, tingling, weakness of the knees/legs with walking, and feeling of instability  Orthopedic history: YES - Date: 20+ years ago - bilateral meniscus tears  Relevant surgical history: YES - Date: 20+ years ago - bilateral meniscus tears  Social history: social history: retired; no recreational activities    No past medical history on file.  Social History     Socioeconomic History     Marital status:    Tobacco Use     Smoking status: Never     Passive exposure: Never     Smokeless tobacco: Never   Substance and Sexual Activity     Alcohol use: Not Currently     Drug use: Never     Social Determinants of Health     Financial Resource Strain: Unknown (10/11/2023)    Financial Resource Strain      Within the past 12 months, have you or your family members you live with been unable to get utilities (heat, electricity) when it was really needed?: Patient refused   Food Insecurity: Unknown (10/11/2023)    Food Insecurity      Within the past 12 months, did you worry that your food would run out before you got money to buy more?: Patient refused      Within the past 12 months, did the food you bought just not last and you didn t have money to get more?: Patient refused   Transportation Needs: Unknown (10/11/2023)    Transportation Needs      Within the past 12 months, has lack of transportation kept you from medical appointments, getting your  medicines, non-medical meetings or appointments, work, or from getting things that you need?: Patient refused   Interpersonal Safety: Unknown (10/12/2023)    Interpersonal Safety      Do you feel physically and emotionally safe where you currently live?: Patient refused      Within the past 12 months, have you been hit, slapped, kicked or otherwise physically hurt by someone?: Patient refused      Within the past 12 months, have you been humiliated or emotionally abused in other ways by your partner or ex-partner?: Patient refused   Housing Stability: Unknown (10/11/2023)    Housing Stability      Do you have housing? : Patient refused      Are you worried about losing your housing?: Patient refused         Patient's past medical, surgical, social, and family histories were personally reviewed today and no changes are noted.    REVIEW OF SYSTEMS:  10 point ROS is negative other than symptoms noted above in HPI, Past Medical History or as stated below  Constitutional: NEGATIVE for fever, chills, change in weight  Skin: NEGATIVE for worrisome rashes, moles or lesions  GI/: NEGATIVE for bowel or bladder changes  Neuro: NEGATIVE for weakness, dizziness or paresthesias    OBJECTIVE:  Vital signs as noted in EPIC for 2/21/2024  General: healthy, alert and in no distress  HEENT: no scleral icterus or conjunctival erythema  Skin: no suspicious lesions or rash. No jaundice.  CV: no pedal edema  Resp: normal respiratory effort without conversational dyspnea   Psych: normal mood and affect  Gait: normal steady gait with appropriate coordination and balance  Neuro: Normal light sensory exam of lower extremity      MSK:  Exam shows a pleasant 64-year-old female who ablates weightbearing without assistive device.  BMI 49.  Patient is alert and oriented x 3.  Examination of both knees shows no significant bruising, ecchymotic change.  Mild knee effusion with synovitis noted bilaterally.  Patient has small superficial  varicosities below the knees of both lower extremities.  Patient has full knee extension bilaterally and flexion within normal limits past 120 degrees.  There is some crepitation.  Patient is diffusely tender along the medial joint line of both knees with no particular pain laterally on the left and mild pain on the right.  Ligament exam is stable with no significant alignment abnormality.  Circumduction maneuvers negative.  Patient has full active and passive range of motion of hips without pain or discomfort.  Motor tone is 5 out of 5 in both lower extremity motor groups including quadriceps, hamstrings and hip flexors.  No excessive warmth of the knees.  No significant peripheral edema.  Homans' sign is negative.  No pain in the popliteal fossa.        Independent visualization of the below image:  2 view previous x-rays of bilateral knees and sunrise merchant view obtained of both knees today show significant grade 3 medial compartment osteoarthritis with early degenerative spurring.  No evidence of fracture or dislocation.  Merchant view shows degenerative changes of the patellofemoral joint without bone-on-bone contact with early degenerative spurring.    KNEE ONE-TWO VIEWS BILATERAL  2/15/2024 11:28 AM      HISTORY: High risk medication use; Seronegative erosive rheumatoid  arthritis (H)  COMPARISON: None.                                                                    IMPRESSION:      Right knee: No acute fracture or malalignment. Moderate medial  compartment joint space narrowing. Mild lateral and patellofemoral  compartment marginal bony spurring. No significant knee joint  effusion.     Left knee: No acute fracture or malalignment. Mild tricompartmental  degenerative changes. Minimal knee joint effusion.     KOLE JAMES MD    Large Joint Injection/Arthocentesis: bilateral knee    Date/Time: 2/23/2024 2:29 PM    Performed by: Indra Heck PA-C  Authorized by: Indra eHck PA-C     Indications:  Pain, osteoarthritis and joint swelling  Needle Size:  22 G  Guidance: landmark guided    Approach:  Anterolateral  Location:  Knee  Laterality:  Bilateral      Medications (Right):  40 mg triamcinolone 40 MG/ML; 2 mL BUPivacaine 0.5 %; 4 mL lidocaine 1 %  Medications (Left):  40 mg triamcinolone 40 MG/ML; 2 mL BUPivacaine 0.5 %; 4 mL lidocaine 1 %  Outcome:  Tolerated well, no immediate complications  Procedure discussed: discussed risks, benefits, and alternatives    Consent Given by:  Patient  Timeout: timeout called immediately prior to procedure    Prep: patient was prepped and draped in usual sterile fashion          Patient's conditions were thoroughly discussed during today's visit with total time reviewing patient's previous medical records/history/radiology, face-to-face examination and discussion and plan of care with the patient and documentation being 35 minutes.    Indra Heck PA-C  [facility]    This note was completed in part using a voice recognition software, any grammatical or context distortion are unintentional and inherent to the software.       Again, thank you for allowing me to participate in the care of your patient.        Sincerely,        Indra Heck PA-C

## 2024-02-23 NOTE — PATIENT INSTRUCTIONS
Today we discussed the underlying etiology/pathology of patient's   1. Primary osteoarthritis of both knees    2. Rheumatoid arthritis involving multiple sites with positive rheumatoid factor (H)    3. Type 2 diabetes mellitus with hyperglycemia, with long-term current use of insulin (H)    4. Morbid obesity (H)      - -We discussed patient's bilateral knee pain and how it is related to osteoarthritis as well as her rheumatoid arthritis and the fact that these are different conditions.  Discussed the underlying etiologies and progression of both conditions today.  - We discussed the etiology of osteoarthritis as well as various treatment options for the mainstay of management of osteoarthritis including rest, activity modification, proper technique with ambulation of steps/stairs which is to lead with good leg going up and lead with painful/symptomatic leg going down,  maintaining healthy weight management due to the 1:4 rule for joint stress with excess weight, herbal supplementation such as turmeric to decrease inflammation , healthy nutrition, use of oral NSAIDs (if not contraindicated due to patient being on chronic blood thinner medication or other medical conditions) with supplemental Tylenol up to 3000 mg daily, topical pain relievers such as Voltaren 1% gel to be applied 3 times a day to the area of pain, consideration for intra-articular cortisone injections, viscosupplementation or PRP injections, formal physical therapy for joint mobilization and strengthening and possible surgical consideration if all conservative treatments fail.   -At this time we will proceed with bilateral cortisone injections for both knees.  Patient understood that she will have elevation of her glucose with her diabetes and will utilize her insulin sliding scale appropriately.  - Patient also will be referred to physical therapy for knee osteoarthritis/strengthening program.  - Patient to continue under the care of  "rheumatology for her rheumatoid arthritis with DM ARDS  -Patient should work on weight loss strategies doing low impact exercises such as pool/aquatic therapy or stationary bicycle would be an excellent option.      -Call direct clinic number [[phone]] at any time with questions or concerns in regards to your recent office visit with me.     Indra Heck PA-C  [facility]    1. Primary osteoarthritis of both knees    2. Rheumatoid arthritis involving multiple sites with positive rheumatoid factor (H)    3. Type 2 diabetes mellitus with hyperglycemia, with long-term current use of insulin (H)      Steroid injection of the bilateral knee was performed today in clinic, 2/23/2024 by Indra Heck PA-C  Time spent today with patient was separate/exclusive from performing the associated procedure.       Patient instructions after cortisone injection:   - Do not soak in a hot tub, bath or swimming pool for 48 hours to minimize risk of infection to injection site  - Ok to shower for daily cleaning  - Apply ice today to injection site for 10-15 minutes up to 3 times a day and only do your normal amounts of activity to prevent increased inflammation and pain for the next few days  - The lidocaine, \"numbing medicine\" (what is giving you pain relief right now) will likely wear off in 1-2 hours.  Once the lidocaine wears off you may note symptoms similar to before you received the injection and this is normal and expected. The corticosteroid which is designed to give prolonged relief of your symptoms will not reach maximum effect/relief until approximately 1-2 weeks from date of injection.   -In a small percentage of people, cortisone can cause flushing/redness in the face. This usually lasts for 1-3 days and resolves. Cool compress to the face, low dose benadryl and Ibuprofen/Tylenol can help if this happens.  -If you are diabetic you will notice increased blood sugar levels for up to a week " "due to your cortisone injection. Adjust your insulin sliding scale to correct your blood sugar levels.  If you are diabetic and take only oral/pill glycemic control medications  please continue with your normal daily dosing.  If any concerns in regards to your blood sugars or diabetic medications please address this with your primary care provider managing your diabetes.    -Rarely for a small percentage of patients, the area injected can become more painful and difficult to move for 24-36 hours after your injection due to a local inflammation response to the injection.  This is called a \"cortisone flare\".   It will resolve over a couple days on its own.  Rest, gentle motion of the joint, Tylenol, NSAID's (ibuprofen, Aleve) and ice to the area are all helpful to minimize discomfort.  -Cortisone injections can be repeated anytime after 4-6 months if your pain returns but should only be repeated if you are symptomatic and not to be done for preventative reasons.   -Proceeding with cortisone injection will delay surgical consideration such as total joint arthroplasty/joint replacement for the joint that was injected for a minimum of 4 months from the date of cortisone injection.    Follow up for repeat assessment and possible repeat injection if pain returns.               "

## 2024-02-24 ENCOUNTER — MYC MEDICAL ADVICE (OUTPATIENT)
Dept: FAMILY MEDICINE | Facility: CLINIC | Age: 64
End: 2024-02-24

## 2024-02-26 NOTE — TELEPHONE ENCOUNTER
See pt's Mychart message.     Attempted to call pt, unable to leave message as the phone just rang.   -Sent a Mychart message to call to be triaged.     Filomena BOJORQUEZ RN   PAL (Patient Advocate Liaison)  [facility]  [phone]

## 2024-02-27 ENCOUNTER — MYC REFILL (OUTPATIENT)
Dept: RHEUMATOLOGY | Facility: CLINIC | Age: 64
End: 2024-02-27

## 2024-02-27 ENCOUNTER — TELEPHONE (OUTPATIENT)
Dept: ORTHOPEDICS | Facility: CLINIC | Age: 64
End: 2024-02-27

## 2024-02-27 DIAGNOSIS — M05.79 RHEUMATOID ARTHRITIS INVOLVING MULTIPLE SITES WITH POSITIVE RHEUMATOID FACTOR (H): Primary | ICD-10-CM

## 2024-02-27 DIAGNOSIS — I50.9 CONGESTIVE HEART FAILURE, UNSPECIFIED HF CHRONICITY, UNSPECIFIED HEART FAILURE TYPE (H): ICD-10-CM

## 2024-02-27 DIAGNOSIS — E78.5 HYPERLIPIDEMIA LDL GOAL <100: ICD-10-CM

## 2024-02-27 DIAGNOSIS — M06.00 SERONEGATIVE EROSIVE RHEUMATOID ARTHRITIS (H): ICD-10-CM

## 2024-02-27 RX ORDER — ATORVASTATIN CALCIUM 40 MG/1
TABLET, FILM COATED ORAL
Qty: 90 TABLET | Refills: 0 | Status: SHIPPED | OUTPATIENT
Start: 2024-02-27 | End: 2024-03-05

## 2024-02-27 RX ORDER — VALSARTAN 160 MG/1
160 TABLET ORAL DAILY
Qty: 90 TABLET | Refills: 0 | Status: SHIPPED | OUTPATIENT
Start: 2024-02-27 | End: 2024-08-08

## 2024-02-27 NOTE — TELEPHONE ENCOUNTER
See pt's [employer] Liaison message response.     Replied via [employer] Liaison. Closing encounter.     Filomena BOJORQUEZ RN   PAL (Patient Advocate Liaison)  [facility]  [phone]

## 2024-02-27 NOTE — CONFIDENTIAL NOTE
I spoke with Rocio today in regards to how she is doing for her knees status post bilateral cortisone injections.  We also discussed her blood sugar elevation which again was discussed and expected.  She states that she did reach out to her primary care provider to discuss her blood sugar elevations and if she needed to adjust her insulin.  In review of medical record it appears the care team did try to reach out to her but was unable to reach her.  Patient states her blood sugars have normalized at this point.  Overall her knees are doing better.  She has no concerns.  All questions addressed.  Patient will continue with treatment plan outlined at our office visit.

## 2024-02-28 NOTE — TELEPHONE ENCOUNTER
Patient had Rheum visit on 2/12/24.     Plan:      Schedule follow-up with Jessica Dahl PA-C in 3 months.   Labs:  CBC, Creatinine, Albumin, AST, ALT, CRP and Sed Rate this week  Imaging: xray bilateral knee  Medication recommendations:             Increase Methotrexate 15mg (0.6mL SubQ) weekly.   WEEKLY. While on Methotrexate:  -check labs (ALT/AST, albumin, CBC with platelets and creatinine) every 3 months  -Limit alcohol to 2 drinks weekly  -Take Folic Acid 1mg daily   -Tylenol 500-1000mg can be used as needed up to three times daily for nausea/headache associated with dosing     Continue Humira 40mg/0.4mL SubQ every 14 days.     Monitoring labs done on 2/15 WNL.  Refilled per specialty scope of practice.    Sheridan Grace RN on 2/28/2024 at 8:13 AM

## 2024-03-03 DIAGNOSIS — I50.9 CONGESTIVE HEART FAILURE, UNSPECIFIED HF CHRONICITY, UNSPECIFIED HEART FAILURE TYPE (H): ICD-10-CM

## 2024-03-04 RX ORDER — FUROSEMIDE 20 MG
20 TABLET ORAL 2 TIMES DAILY
Qty: 180 TABLET | Refills: 0 | Status: SHIPPED | OUTPATIENT
Start: 2024-03-04 | End: 2024-05-30

## 2024-03-04 RX ORDER — METOPROLOL SUCCINATE 50 MG/1
50 TABLET, EXTENDED RELEASE ORAL
Qty: 90 TABLET | Refills: 0 | Status: SHIPPED | OUTPATIENT
Start: 2024-03-04 | End: 2024-03-05

## 2024-03-05 ENCOUNTER — OFFICE VISIT (OUTPATIENT)
Dept: FAMILY MEDICINE | Facility: CLINIC | Age: 64
End: 2024-03-05
Payer: COMMERCIAL

## 2024-03-05 ENCOUNTER — TELEPHONE (OUTPATIENT)
Dept: FAMILY MEDICINE | Facility: CLINIC | Age: 64
End: 2024-03-05

## 2024-03-05 ENCOUNTER — OFFICE VISIT (OUTPATIENT)
Dept: PHARMACY | Facility: CLINIC | Age: 64
End: 2024-03-05
Payer: COMMERCIAL

## 2024-03-05 VITALS
HEIGHT: 61 IN | BODY MASS INDEX: 48.22 KG/M2 | HEART RATE: 56 BPM | DIASTOLIC BLOOD PRESSURE: 81 MMHG | TEMPERATURE: 97.9 F | OXYGEN SATURATION: 94 % | SYSTOLIC BLOOD PRESSURE: 136 MMHG | RESPIRATION RATE: 17 BRPM | WEIGHT: 255.4 LBS

## 2024-03-05 DIAGNOSIS — J45.40 MODERATE PERSISTENT ASTHMA WITHOUT COMPLICATION: ICD-10-CM

## 2024-03-05 DIAGNOSIS — E78.5 HYPERLIPIDEMIA LDL GOAL <100: ICD-10-CM

## 2024-03-05 DIAGNOSIS — I50.9 CONGESTIVE HEART FAILURE, UNSPECIFIED HF CHRONICITY, UNSPECIFIED HEART FAILURE TYPE (H): ICD-10-CM

## 2024-03-05 DIAGNOSIS — R03.0 ELEVATED BP WITHOUT DIAGNOSIS OF HYPERTENSION: ICD-10-CM

## 2024-03-05 DIAGNOSIS — Z79.4 TYPE 2 DIABETES MELLITUS WITH HYPERGLYCEMIA, WITH LONG-TERM CURRENT USE OF INSULIN (H): Primary | ICD-10-CM

## 2024-03-05 DIAGNOSIS — G62.9 PERIPHERAL POLYNEUROPATHY: ICD-10-CM

## 2024-03-05 DIAGNOSIS — E11.69 TYPE 2 DIABETES MELLITUS WITH OTHER SPECIFIED COMPLICATION, WITH LONG-TERM CURRENT USE OF INSULIN (H): ICD-10-CM

## 2024-03-05 DIAGNOSIS — G25.81 RESTLESS LEG SYNDROME: ICD-10-CM

## 2024-03-05 DIAGNOSIS — Z79.4 TYPE 2 DIABETES MELLITUS WITH OTHER SPECIFIED COMPLICATION, WITH LONG-TERM CURRENT USE OF INSULIN (H): ICD-10-CM

## 2024-03-05 DIAGNOSIS — E11.65 TYPE 2 DIABETES MELLITUS WITH HYPERGLYCEMIA, WITH LONG-TERM CURRENT USE OF INSULIN (H): Primary | ICD-10-CM

## 2024-03-05 DIAGNOSIS — E78.5 HYPERLIPIDEMIA LDL GOAL <70: ICD-10-CM

## 2024-03-05 DIAGNOSIS — M17.0 PRIMARY OSTEOARTHRITIS OF BOTH KNEES: ICD-10-CM

## 2024-03-05 DIAGNOSIS — Z23 ENCOUNTER FOR IMMUNIZATION: ICD-10-CM

## 2024-03-05 DIAGNOSIS — G62.9 NEUROPATHY: ICD-10-CM

## 2024-03-05 DIAGNOSIS — F33.0 MILD EPISODE OF RECURRENT MAJOR DEPRESSIVE DISORDER (H): ICD-10-CM

## 2024-03-05 DIAGNOSIS — G25.81 RESTLESS LEGS SYNDROME (RLS): ICD-10-CM

## 2024-03-05 DIAGNOSIS — F32.A DEPRESSION, UNSPECIFIED DEPRESSION TYPE: ICD-10-CM

## 2024-03-05 LAB — HBA1C MFR BLD: 8.2 % (ref 0–5.6)

## 2024-03-05 PROCEDURE — 99605 MTMS BY PHARM NP 15 MIN: CPT | Performed by: PHARMACIST

## 2024-03-05 PROCEDURE — 90472 IMMUNIZATION ADMIN EACH ADD: CPT | Performed by: PHYSICIAN ASSISTANT

## 2024-03-05 PROCEDURE — 90686 IIV4 VACC NO PRSV 0.5 ML IM: CPT | Performed by: PHYSICIAN ASSISTANT

## 2024-03-05 PROCEDURE — 36415 COLL VENOUS BLD VENIPUNCTURE: CPT | Performed by: PHYSICIAN ASSISTANT

## 2024-03-05 PROCEDURE — 90471 IMMUNIZATION ADMIN: CPT | Performed by: PHYSICIAN ASSISTANT

## 2024-03-05 PROCEDURE — 99607 MTMS BY PHARM ADDL 15 MIN: CPT | Performed by: PHARMACIST

## 2024-03-05 PROCEDURE — 83036 HEMOGLOBIN GLYCOSYLATED A1C: CPT | Performed by: PHYSICIAN ASSISTANT

## 2024-03-05 PROCEDURE — 99214 OFFICE O/P EST MOD 30 MIN: CPT | Mod: 25 | Performed by: PHYSICIAN ASSISTANT

## 2024-03-05 PROCEDURE — 90678 RSV VACC PREF BIVALENT IM: CPT | Performed by: PHYSICIAN ASSISTANT

## 2024-03-05 RX ORDER — CITALOPRAM HYDROBROMIDE 20 MG/1
20 TABLET ORAL DAILY
Qty: 90 TABLET | Refills: 1 | Status: SHIPPED | OUTPATIENT
Start: 2024-03-05 | End: 2024-08-08

## 2024-03-05 RX ORDER — ATORVASTATIN CALCIUM 40 MG/1
TABLET, FILM COATED ORAL
Qty: 90 TABLET | Refills: 3 | Status: SHIPPED | OUTPATIENT
Start: 2024-03-05 | End: 2024-08-08

## 2024-03-05 RX ORDER — DAPAGLIFLOZIN 5 MG/1
5 TABLET, FILM COATED ORAL DAILY
Qty: 90 TABLET | Refills: 1 | Status: SHIPPED | OUTPATIENT
Start: 2024-03-05 | End: 2024-08-26

## 2024-03-05 RX ORDER — GABAPENTIN 300 MG/1
300 CAPSULE ORAL 3 TIMES DAILY
Qty: 270 CAPSULE | Refills: 1 | Status: SHIPPED | OUTPATIENT
Start: 2024-03-05 | End: 2024-08-08

## 2024-03-05 RX ORDER — PRAMIPEXOLE DIHYDROCHLORIDE 0.12 MG/1
TABLET ORAL
Qty: 360 TABLET | Refills: 1 | Status: SHIPPED | OUTPATIENT
Start: 2024-03-05 | End: 2024-08-08

## 2024-03-05 RX ORDER — METOPROLOL SUCCINATE 50 MG/1
50 TABLET, EXTENDED RELEASE ORAL DAILY
Qty: 90 TABLET | Refills: 1 | Status: SHIPPED | OUTPATIENT
Start: 2024-03-05 | End: 2024-08-08

## 2024-03-05 ASSESSMENT — ANXIETY QUESTIONNAIRES
4. TROUBLE RELAXING: SEVERAL DAYS
7. FEELING AFRAID AS IF SOMETHING AWFUL MIGHT HAPPEN: NOT AT ALL
7. FEELING AFRAID AS IF SOMETHING AWFUL MIGHT HAPPEN: NOT AT ALL
1. FEELING NERVOUS, ANXIOUS, OR ON EDGE: SEVERAL DAYS
IF YOU CHECKED OFF ANY PROBLEMS ON THIS QUESTIONNAIRE, HOW DIFFICULT HAVE THESE PROBLEMS MADE IT FOR YOU TO DO YOUR WORK, TAKE CARE OF THINGS AT HOME, OR GET ALONG WITH OTHER PEOPLE: SOMEWHAT DIFFICULT
GAD7 TOTAL SCORE: 5
GAD7 TOTAL SCORE: 5
5. BEING SO RESTLESS THAT IT IS HARD TO SIT STILL: NOT AT ALL
2. NOT BEING ABLE TO STOP OR CONTROL WORRYING: SEVERAL DAYS
6. BECOMING EASILY ANNOYED OR IRRITABLE: SEVERAL DAYS
GAD7 TOTAL SCORE: 5
8. IF YOU CHECKED OFF ANY PROBLEMS, HOW DIFFICULT HAVE THESE MADE IT FOR YOU TO DO YOUR WORK, TAKE CARE OF THINGS AT HOME, OR GET ALONG WITH OTHER PEOPLE?: SOMEWHAT DIFFICULT
3. WORRYING TOO MUCH ABOUT DIFFERENT THINGS: SEVERAL DAYS

## 2024-03-05 ASSESSMENT — PATIENT HEALTH QUESTIONNAIRE - PHQ9
SUM OF ALL RESPONSES TO PHQ QUESTIONS 1-9: 6
SUM OF ALL RESPONSES TO PHQ QUESTIONS 1-9: 6
10. IF YOU CHECKED OFF ANY PROBLEMS, HOW DIFFICULT HAVE THESE PROBLEMS MADE IT FOR YOU TO DO YOUR WORK, TAKE CARE OF THINGS AT HOME, OR GET ALONG WITH OTHER PEOPLE: SOMEWHAT DIFFICULT

## 2024-03-05 NOTE — PROGRESS NOTES
Medication Therapy Management (MTM) Encounter    ASSESSMENT:                            Medication Adherence/Access: Will see if she qualifies for PAP for Farxiga and Ozempic as she gets this for Humira     Diabetes:   Patient is not meeting A1c goal of < 7%.  Self monitoring of blood glucose is not at goal of average glucose <150mg/dL and > 70% time in target with continuous glucose monitoring but improving especially on fasting days. Discussed continued dietary changes and recommend re-trial of low dose Farxiga and Ozempic to help reduce insulin and promote weight loss. She is willing to pay for these and working on [facility] [employer] pharmacy or assistance program if qualifies.     Hypertension/HFpEF:   Stable although she would benefit from SLGT2i therapy above for HFpEF.    Hyperlipidemia:   Recheck fasting for next labs, ideally would like LDL <70.    Asthma:   Stable, did discuss using Symbicort more frequently when needed    Depression:  stable    RLS:   stable    Neuropathy:  Stable, work on improving blood glucose     Immunizations:    RSV, flu and covid vaccines due - she will get RSV and flu today.       PLAN:                            Once able to obtain, start Farxiga 5 mg daily. If Jardiance is cheaper, let me know.  Let me know how much the Ozempic is from [facility] or if we can get through PAP. Once you get, will start Ozempic 0.25 mg weekly for 4 weeks then increase to 0.5 mg weekly and we will stop the Novolog and decrease Lantus to 30 units daily.     Follow-up: Return in about 2 months (around 5/5/2024) for Medication Therapy Management Pharmacist.    SUBJECTIVE/OBJECTIVE:                          Rocio Price is a 64 year old female coming in for a co-visit with Isaac Correa PA-C.     Reason for visit: medication review, weight loss.    Allergies/ADRs: Reviewed in chart  Past Medical History: Reviewed in chart  Tobacco: She reports that she has never smoked. She has never been exposed to tobacco  smoke. She has never used smokeless tobacco.  Alcohol: not currently using    Medication Adherence/Access: Patient takes medications out of a pill box  Patient takes medications 3 time(s) per day:   -- Morning: metformin, citalopram, atorvastatin, bupropion, pantoprazole, gabapentin, furosemide   -- Afternoon: gabapentin, pramipexole, furosemide  -- Before bedtime: gabapentin, pramipexole, metformin, valsartan, montelukast, metoprolol  Per patient, misses at bedtime Lantus a few times in a month and Novolog more frequently. Does usually take late in the middle of meals or after meals sometimes due to frequent forgetting.  Medication barriers: affording medications - no rx insurance  The patient fills medications at [facility]: NO, fills medications at various pharmacy for best price due to lack of medication insurance.  [facility] ([place]) for better cost coverage for Farxiga:  [phone] (Fax #)  [phone] (Ph#)     Diabetes   Metformin IR 1000 mg twice daily   Lantus 40 units at morning  Novolog 16 units before/after meals - forgets to take this before meals frequently, often during meals    Patient is not experiencing side effects. Was on Farxiga in the fall but discontinue due to elevated creatinine at the time.   Blood sugar monitoring: Continuous Glucose Monitor meron 3    Has had lows after meals. She is wondering about an adjustment scale.   Current diabetes symptoms:  numbness/tingling.    Diet: fasting some days where she won't need Novolog bc blood glucose much better those days. Sunday and Monday usually fasts all day and only eats Sunday dinner then eats on Tuesday so skips all food on Monday. Working on weight loss but very difficult for her to lose weight.     Eye exam in the last 12 months? No    Foot exam: due  Urine Albumin:   Lab Results   Component Value Date    UMALCR  06/15/2023      Comment:      Unable to calculate, urine albumin and/or urine creatinine is outside detectable  limits.  Microalbuminuria is defined as an albumin:creatinine ratio of 17 to 299 for males and 25 to 299 for females. A ratio of albumin:creatinine of 300 or higher is indicative of overt proteinuria.  Due to biologic variability, positive results should be confirmed by a second, first-morning random or 24-hour timed urine specimen. If there is discrepancy, a third specimen is recommended. When 2 out of 3 results are in the microalbuminuria range, this is evidence for incipient nephropathy and warrants increased efforts at glucose control, blood pressure control, and institution of therapy with an angiotensin-converting-enzyme (ACE) inhibitor (if the patient can tolerate it).        Lab Results   Component Value Date    A1C 8.2 (H) 03/05/2024     Creatinine   Date Value Ref Range Status   02/15/2024 0.85 0.51 - 0.95 mg/dL Final       Hypertension /HFpEF:   valsartan 160 mg daily  Metoprolol ER 50 mg daily  Furosemide 20 mg twice daily  Aspirin 81 mg daily     From chart review, patient had CT cardiac angiogram on 12/10/2018 which noted moderate stenosis of the proximal LAD. Last ECHO from 6/15/2023 with EF of 65%.   Patient reports no current medication side effects.     Patient does is self-monitor blood pressure, 120-130/80's  Patient is somewhat following a low sodium diet, is avoiding EtOH.  Patient reports no current medication side effects     BP Readings from Last 3 Encounters:   03/05/24 136/81   02/23/24 125/75   11/10/23 (!) 173/84     Pulse Readings from Last 3 Encounters:   03/05/24 56   11/10/23 65   10/12/23 53     Wt Readings from Last 3 Encounters:   03/05/24 255 lb 6.4 oz (115.8 kg)   02/23/24 260 lb (117.9 kg)   02/12/24 260 lb (117.9 kg)       Hyperlipidemia   atorvastatin 40mg daily    Not fasting for last lipid panel. Patient reports no significant myalgias or other side effects.       Recent Labs   Lab Test 10/12/23  1147 10/27/22  0944   CHOL 185 155   HDL 60 78   LDL 94 55   TRIG 155* 110          Asthma:   ICS/LABA - Symbicort 80/4.5 mcg - 2 puffs twice daily as needed (hasn't been using lately)  Montelukast 10mg at bedtime     Albuterol (ProAir/Ventolin/Proventil)  Ipratropium and albuterol Neb (DuoNeb)  Saline neb     Not using any of her inhalers lately but does feel like she has more congestion in chest lately.   Triggers include: pollens.    Depression:  Bupropion  mg once daily  Citalopram 20 mg once daily    Patient is not experiencing side effects. Reports anxiety and mood stable, no changes since previous visit.      10/11/2023     8:41 AM 11/5/2023    11:58 AM 3/5/2024    10:20 AM   PHQ   PHQ-9 Total Score 6 6 6   Q9: Thoughts of better off dead/self-harm past 2 weeks Not at all Not at all Not at all         10/12/2023    11:07 AM 3/5/2024    10:21 AM   FAY-7 SCORE   Total Score 7 (mild anxiety) 5 (mild anxiety)   Total Score 7 5       RLS:   Mirapex 0.125mg tablet,  2 tablets afternoon and 2 tablet right before    Dealing wth RLS ongoing for about 2 year now. Bothersome symptoms are usually the morning but medication helps. No side effects reported.      Neuropathy:  Gabapentin 300 mg 3 times daily     Does complain of some numbness that is bothersome in feet but willing to continue current dose and work on blood glucose. No side effects noted.     Most Recent Immunizations   Administered Date(s) Administered    COVID-19 MONOVALENT 12+ (Pfizer) 02/23/2022    Hepatitis A (ADULT 19+) 09/08/2020    Hepatitis B, Adult 06/03/2016    Influenza Vaccine 18-64 (Flublok) 10/27/2022    Influenza Vaccine >6 months,quad, PF 03/05/2024    MMR 06/17/2008    Pneumococcal 20 valent Conjugate (Prevnar 20) 06/15/2023    Pneumococcal 23 valent 05/13/2011    RSV Vaccine (Abrysvo) 03/05/2024    TD,PF 7+ (Tenivac) 01/25/2018    TDAP (Adacel,Boostrix) 09/14/2007    Tdap (Adult) Unspecified Formulation 01/25/2018    Zoster recombinant adjuvanted (SHINGRIX) 01/14/2020       Today's Vitals:   BP Readings from  "Last 1 Encounters:   03/05/24 136/81     Pulse Readings from Last 1 Encounters:   03/05/24 56     Wt Readings from Last 1 Encounters:   03/05/24 255 lb 6.4 oz (115.8 kg)     Ht Readings from Last 1 Encounters:   03/05/24 5' 1\" (1.549 m)     Estimated body mass index is 48.26 kg/m  as calculated from the following:    Height as of an earlier encounter on 3/5/24: 5' 1\" (1.549 m).    Weight as of an earlier encounter on 3/5/24: 255 lb 6.4 oz (115.8 kg).    Temp Readings from Last 1 Encounters:   03/05/24 97.9  F (36.6  C) (Oral)     ----------------      I spent 45 minutes with this patient today. All changes were made via verbal approval with Behzad Correa PA-C. A copy of the visit note was provided to the patient's provider(s).    A summary of these recommendations was given to the patient (see AVS from today's appointment with Isaac Correa PA-C).    Audrey Rosado, PharmD, BCACP  Medication Therapy Management Provider, [facility]  [phone]         Medication Therapy Recommendations  Encounter for immunization    Rationale: Preventive therapy - Needs additional medication therapy - Indication   Recommendation: Order Vaccine - respiratory syncytial virus vaccine, bivalent 120 MCG/0.5ML injection - RSV and flu vaccines   Status: Accepted per Provider         Type 2 diabetes mellitus with hyperglycemia, with long-term current use of insulin (H)    Current Medication: dapagliflozin (FARXIGA) 5 MG TABS tablet   Rationale: Synergistic therapy - Needs additional medication therapy - Indication   Recommendation: Start Medication - Farxiga 5 MG Tabs   Status: Accepted per Provider          Current Medication: insulin aspart (NOVOLOG FLEXPEN RELION) 100 UNIT/ML pen   Rationale: Undesirable effect - Adverse medication event - Safety   Recommendation: Change Medication - Ozempic (0.25 or 0.5 MG/DOSE) 2 MG/1.5ML Sopn   Status: Accepted per Provider            "

## 2024-03-05 NOTE — TELEPHONE ENCOUNTER
Is this a new referral or dose change: new patient    Patient preferred phone number (please verify with pt): [phone]    Additional helpful info for PAP team:  Farxiga and Ozempic, Patient does not have any prescription insurance for medications    Notes for MTM team:   Route TE to p [facility]    New patient referral  PAP team member will contact the patient via phone within 1-2 business days to schedule a medication consult.   PAP team will notify MTM via telephone encounter that a consult is scheduled.   PAP team will document next steps in an Epic telephone encounter, after the consult is completed.   Dose change/refill for current PAP patient  Enter new prescription in Epic (no print-out)  PAP team will document next steps in an Epic telephone encounter

## 2024-03-05 NOTE — PATIENT INSTRUCTIONS
Once you get from [facility], can start Farxiga 5 mg daily. If Jardiance is cheaper, let me know.   Let me know how much the Ozempic is from [facility]. Once you get, will start Ozempic 0.25 mg weekly for 4 weeks then increase to 0.5 mg weekly and we will try to stop the Novolog and decrease Lantus to 30 units daily.   The Diabetes Code and the Obesity Code books by Mikal Valdez on intermittent fasting  Contact the [place] Prescription Assistance Program/[facility] at [phone] for Farxiga and Ozempic if you would qualify.

## 2024-03-05 NOTE — PROGRESS NOTES
Assessment & Plan     Type 2 diabetes mellitus with hyperglycemia, with long-term current use of insulin (H)  Improving but still uncontrolled. Unfortunately does not have medication insurance. Gets all meds through [facility] or [facility]. Will attempt restarting farxiga through [facility] and also attempted ozempic either through [facility] or assistance program to taper off of insulin. Discussed intermittent fasting, diabetes code. Follow up with mtm in 1 month. Recheck A1c in 3 months.   - Hemoglobin A1c; Future  - Hemoglobin A1c  - dapagliflozin (FARXIGA) 5 MG TABS tablet; Take 1 tablet (5 mg) by mouth daily  - semaglutide (OZEMPIC) 2 MG/3ML pen; Inject 0.25 mg Subcutaneous every 7 days for 28 days, THEN 0.5 mg every 7 days.  Medication use and side effects discussed with the patient. Patient is in complete understanding and agreement with plan.       Hyperlipidemia LDL goal <100  Stable on current regimen. Ldl was slightly elevated compared to previous check. Goal at least under 70. To focus on weight loss and maintain current regimen. Recheck in ~9 months.   - atorvastatin (LIPITOR) 40 MG tablet; TAKE ONE TABLET BY MOUTH EVERY DAY , DUE FOR APPOINTMENT, PLESE CALL MD  Medication use and side effects discussed with the patient. Patient is in complete understanding and agreement with plan.     Mild episode of recurrent major depressive disorder (H24)  Stable on current regimen. To maintain. Recheck phq 9 in 6 months.   - citalopram (CELEXA) 20 MG tablet; Take 1 tablet (20 mg) by mouth daily  Medication use and side effects discussed with the patient. Patient is in complete understanding and agreement with plan.     Restless legs syndrome (RLS)  Stable on current regimen.   - pramipexole (MIRAPEX) 0.125 MG tablet; TAKE TWO TABLETS BY MOUTH EVERY DAY IN THE AFTERNOON & TAKE TWO TABLETS BY MOUTH EVERY EVENING  Medication use and side effects discussed with the patient. Patient is in complete understanding and agreement with  "plan.     Congestive heart failure, unspecified HF chronicity, unspecified heart failure type (H)  Followed by cardiology as well. To restart bennett for this and per emr review, overdue for cardiology follow up. Pal to monitor and she to make sure she schedules this in the next ~2 months or so if possible.   - metoprolol succinate ER (TOPROL XL) 50 MG 24 hr tablet; Take 1 tablet (50 mg) by mouth daily  Medication use and side effects discussed with the patient. Patient is in complete understanding and agreement with plan.     Peripheral polyneuropathy  Ongoing. On gabapentin. Lower dose. May increase if needed but would like to avoid due to weight concerns. Patient agrees. Will monitor for now. Stable and tolerable.   - gabapentin (NEURONTIN) 300 MG capsule; Take 1 capsule (300 mg) by mouth 3 times daily  Medication use and side effects discussed with the patient. Patient is in complete understanding and agreement with plan.     (M17.0) Primary osteoarthritis of both knees  Comment: recent steroid injection through ortho. This aids. However, given diabetes as well as research showing progression of arthritis with steroid vs hyaluronic acid injections, if symptoms worsen in near future, recommending discussing this as possible alternative for patient if appropriate.   Plan:             BMI  Estimated body mass index is 48.26 kg/m  as calculated from the following:    Height as of this encounter: 1.549 m (5' 1\").    Weight as of this encounter: 115.8 kg (255 lb 6.4 oz).   Weight management plan: Discussed healthy diet and exercise guidelines        Wali Chan is a 64 year old, presenting for the following health issues:  Diabetes        3/5/2024    10:39 AM   Additional Questions   Roomed by Annie Demarco     Via the Health Maintenance questionnaire, the patient has reported the following services have been completed -Eye Exam, this information has been sent to the abstraction team.  History of Present " Illness       Diabetes:   She presents for follow up of diabetes.   She is checking home blood glucose with a continuous glucose monitor.   She checks blood glucose before and after meals.  Blood glucose is sometimes over 200 and sometimes under 70. She is aware of hypoglycemia symptoms including shakiness, dizziness, weakness and lethargy.   She is concerned about other.   She is having burning in feet.  The patient has had a diabetic eye exam in the last 12 months. Eye exam performed on [id number]. Location of last eye exam [facility].        She eats 0-1 servings of fruits and vegetables daily.She consumes 0 sweetened beverage(s) daily.She exercises with enough effort to increase her heart rate 9 or less minutes per day.  She exercises with enough effort to increase her heart rate 3 or less days per week. She is missing 2 dose(s) of medications per week.         Hyperlipidemia Follow-Up    Are you regularly taking any medication or supplement to lower your cholesterol?   Yes- statin  Are you having muscle aches or other side effects that you think could be caused by your cholesterol lowering medication?  No    Heart Failure Follow-up   Are you experiencing any shortness of breath? No  Are you experiencing any swelling in your legs or feet?  No  Are you using more pillows than usual? Yes  Do you check your weight daily?  Yes  Have you had a weight change recently?  No  Are you having any of the following side effects from your medications? (Select all that apply)  none   Since your last visit, how many times have you gone to the cardiologist, urgent care, emergency room, or hospital because of your heart failure?   1 time  Last Echo: Echo result w/o MOPS:        Depression Followup  How are you doing with your depression since your last visit? No change  Are you having other symptoms that might be associated with depression? No  Have you had a significant life event?  No   Are you feeling anxious or having panic attacks?  "  No  Do you have any concerns with your use of alcohol or other drugs? No    Social History     Tobacco Use    Smoking status: Never     Passive exposure: Never    Smokeless tobacco: Never   Substance Use Topics    Alcohol use: Not Currently    Drug use: Never         10/11/2023     8:41 AM 11/5/2023    11:58 AM 3/5/2024    10:20 AM   PHQ   PHQ-9 Total Score 6 6 6   Q9: Thoughts of better off dead/self-harm past 2 weeks Not at all Not at all Not at all         10/12/2023    11:07 AM 3/5/2024    10:21 AM   FAY-7 SCORE   Total Score 7 (mild anxiety) 5 (mild anxiety)   Total Score 7 5       Suicide Assessment Five-step Evaluation and Treatment (SAFE-T)            Objective    /81 (BP Location: Right arm, Patient Position: Sitting, Cuff Size: Adult Large)   Pulse 56   Temp 97.9  F (36.6  C) (Oral)   Resp 17   Ht 1.549 m (5' 1\")   Wt 115.8 kg (255 lb 6.4 oz)   LMP  (LMP Unknown)   SpO2 94%   BMI 48.26 kg/m    Body mass index is 48.26 kg/m .  Physical Exam   GENERAL: alert, no distress, and obese  RESP: lungs clear to auscultation - no rales, rhonchi or wheezes  CV: regular rates and rhythm, normal S1 S2, no S3 or S4, and no murmur, click or rub  PSYCH: mentation appears normal, affect normal/bright    Results for orders placed or performed in visit on 03/05/24 (from the past 24 hour(s))   Hemoglobin A1c   Result Value Ref Range    Hemoglobin A1C 8.2 (H) 0.0 - 5.6 %           Signed Electronically by: Behzad Correa PA-C    "

## 2024-03-06 ENCOUNTER — MYC REFILL (OUTPATIENT)
Dept: FAMILY MEDICINE | Facility: CLINIC | Age: 64
End: 2024-03-06

## 2024-03-06 DIAGNOSIS — K21.00 GASTROESOPHAGEAL REFLUX DISEASE WITH ESOPHAGITIS WITHOUT HEMORRHAGE: ICD-10-CM

## 2024-03-06 RX ORDER — PANTOPRAZOLE SODIUM 40 MG/1
40 TABLET, DELAYED RELEASE ORAL DAILY
Qty: 90 TABLET | Refills: 3 | Status: SHIPPED | OUTPATIENT
Start: 2024-03-06 | End: 2024-08-08

## 2024-03-06 RX ORDER — PANTOPRAZOLE SODIUM 40 MG/1
40 TABLET, DELAYED RELEASE ORAL DAILY
Qty: 90 TABLET | Refills: 1 | OUTPATIENT
Start: 2024-03-06

## 2024-03-18 ENCOUNTER — TELEPHONE (OUTPATIENT)
Dept: FAMILY MEDICINE | Facility: CLINIC | Age: 64
End: 2024-03-18

## 2024-03-18 NOTE — TELEPHONE ENCOUNTER
On 03/18/2024 I spoke with Rocio, she is in need of financial assistance for medication.    We reviewed the  assistance programs, gross income, insurance and RX list.       assistance application will soon be completed for Ozempic during the 2024 New Enrollment Period.    If approved, she will receive this medication at no cost through December 2024.     Rocio is over-income for the [place] for Farziga, if there is another medication to replace that, please let me know.    I also looked into all the possible replacements for her Spiriva, unfortunately, she is over-income for those as well.     Tanya Perez  Prescription   Please send responses to [facility]

## 2024-04-05 ENCOUNTER — VIRTUAL VISIT (OUTPATIENT)
Dept: PALLIATIVE MEDICINE | Facility: OTHER | Age: 64
End: 2024-04-05
Attending: PHYSICIAN ASSISTANT
Payer: COMMERCIAL

## 2024-04-05 DIAGNOSIS — M05.79 RHEUMATOID ARTHRITIS INVOLVING MULTIPLE SITES WITH POSITIVE RHEUMATOID FACTOR (H): Primary | ICD-10-CM

## 2024-04-05 NOTE — PATIENT INSTRUCTIONS
"Recommendations from today's MTM visit:                                                       Continue current medication regimen  Schedule follow up appointment with Jessica Dahl for May 2024    Follow-up: Return in 3 months (on 7/11/2024) for Follow up, with me, using a phone visit.    It was great speaking with you today.  I value your experience and would be very thankful for your time in providing feedback in our clinic survey. In the next few days, you may receive an email or text message from [facility] [employer] with a link to a survey related to your  clinical pharmacist.\"     To schedule another MTM appointment, please call the clinic directly or you may call the MTM scheduling line at [phone] or toll-free at [phone].     My Clinical Pharmacist's contact information:                                                      Please feel free to contact me with any questions or concerns you have.      Luis Antonio Dee, PharmD  Medication Therapy Management Pharmacist  [facility]  Phone: [phone]   "

## 2024-04-05 NOTE — Clinical Note
Jace Lopez,  She is noticing some reduction in pain and swelling with the Humira and methotrexate. She increased the methotrexate to 17.5 mg SubQ once weekly and feels like that helped. She is noticing some hair loss since starting the methotrexate, but states it didn't worsen with the dose increase and is tolerable. She is due to see you in May so I asked her schedule a follow up visit so to monitor the hair loss, medication efficacy, and potentially increase folic acid to see if that helps reduce side effects from the methotrexate. If you want her to increase the folic acid ahead of time, let me know and I can communicate that to the patient.  Thanks, Luis Antonio Dee, PharmD Medication Therapy Management Pharmacist [facility] Phone: [phone]

## 2024-04-05 NOTE — PROGRESS NOTES
Medication Therapy Management (MTM) Encounter    ASSESSMENT:                            Medication Adherence/Access: No issues identified    Seronegative erosive rheumatoid arthritis: Hair loss is a potential side effect from methotrexate and patient may benefit from increasing folic acid dose to 2 mg daily to see if that helps reduce this side effect. Will discuss with her rheumatologist. Patient would benefit from continuing Humira and methotrexate at current doses. She is due for drug toxicity monitoring labs approximately 5/15/2024 (3 months since last taken). Jessica approved folic acid dose increase to 2 mg daily. New order sent and patient notified via [employer] 4/5/24.    PLAN:                            Continue current medication regimen  Schedule follow up appointment with Jessica Dahl for May 2024    Follow-up: Return in 3 months (on 7/11/2024) for Follow up, with me, using a phone visit.    SUBJECTIVE/OBJECTIVE:                          Rocio Price is a 64 year old female called for a follow-up visit from 01/05/2024. She was referred to me from Jessica Dahl PA-C.       Reason for visit: Rheumatoid arthritis.    Allergies/ADRs: Reviewed in chart  Past Medical History: Reviewed in chart  Tobacco: She reports that she has never smoked. She has never been exposed to tobacco smoke. She has never used smokeless tobacco.  Alcohol: Less than 1 beverage / month  Caffeine: cup of coffee every morning    Medication Adherence/Access: No issues identified     Seronegative erosive rheumatoid arthritis:   Humira 40 mg every 14 days in the stomach on Wednesdays  Methotrexate 17.5 mg subcutaneously once weekly on Wednesdays  Folic acid 1 mg daily  Acetaminophen 1000 mg twice daily AM and late afternoon as needed      She is seeing a little bit of improvement since we last spoke. She has less swelling and less pain in her hands and has been able to reduce the amount of acetaminophen she is taking. She having  swelling and pain in her right elbow that has not improved since starting therapy. She is noticing hair loss since starting the methotrexate injections, but states it is tolerable. She increased her dose of methotrexate from 15 mg weekly to 17.5 mg on her own and noticed it helped control her pain and swelling better. The dose increase did not worsen the hair loss. Had cortisone shots in both of her knees on 2/23/24 and that helped with the pain and her mobility. Prior to the cortisone shots she was having difficulty walking, but states that is better now.    Last labs on 2/15/24 reviewed in chart.    Patient declined to discuss other conditions at this visit as she is also seeing a primary care [facility] pharmacist. States that everything else is going well.    Today's Vitals: LMP  (LMP Unknown)   ----------------    I spent 18 minutes with this patient today. All changes were made via collaborative practice agreement with Jessica Dahl. A copy of the visit note was provided to the patient's provider(s).    A summary of these recommendations was sent via [employer].    Luis Antonio Dee, PharmD  Medication Therapy Management Pharmacist  [facility]  Phone: [phone]    Telemedicine Visit Details  Type of service:  Telephone visit  Start Time:  11:00 AM  End Time:  11:18 AM     Medication Therapy Recommendations  Rheumatoid arthritis involving multiple sites with positive rheumatoid factor (H)    Current Medication: Methotrexate Sodium 250 MG/10ML SOLN   Rationale: Undesirable effect - Adverse medication event - Safety   Recommendation: Increase Dose - folic acid 1 MG tablet   Status: Contact Provider - Awaiting Response   Note: Recommend increasing folic acid to 2 mg daily

## 2024-04-11 ENCOUNTER — TELEPHONE (OUTPATIENT)
Dept: FAMILY MEDICINE | Facility: CLINIC | Age: 64
End: 2024-04-11

## 2024-04-11 NOTE — TELEPHONE ENCOUNTER
Rocio's Ozempic application has been submitted to the [employer] prescription assistance program.     We will note EPIC when [employer] has made their decision.     Thank you!    Inés Velásquez   Prescription Assistance Assistant

## 2024-04-26 ENCOUNTER — TELEPHONE (OUTPATIENT)
Dept: FAMILY MEDICINE | Facility: CLINIC | Age: 64
End: 2024-04-26

## 2024-04-26 NOTE — TELEPHONE ENCOUNTER
Rocio has been approved to the  assistance program for Ozempic through December 31, 2024.      She will continue to receive, at no cost, 120 day supplies of Ozempic, delivered to the [facility] through the enrollment period.     THANK YOU for handling this delivery and calling the patient when it arrived earlier today!    The patient will call us for refills and will receive a detailed letter of instructions from our office.    Thank you again for your assistance,  Tanya Perez  Prescription   Please send responses to [email or web address]

## 2024-04-26 NOTE — TELEPHONE ENCOUNTER
- 04/26/2024 at 10:40 AM, clinic receives [employer] Shipment of prescriptions for patient:     Shipment Information      - Packing List container: [id number]      - Order #: [id number]     Product      - Ozempic 1x3 ml prefilled pen      - Strength: 2 mg/3 ml 0.68 mg/ml      - Lot # [id number]      - Expiration Date: 10/31/2025      - Unit Quantity: 5     Clinic Action  - Prescription placed in vaccine refrigerator with patient label awaiting patient .   - Call placed to patient to inform that scripts are ready to be picked up.      Kelvin Thomas RN  Patient Advocate Liaison (PAL)  [facility]

## 2024-05-23 DIAGNOSIS — F33.0 MILD EPISODE OF RECURRENT MAJOR DEPRESSIVE DISORDER (H): ICD-10-CM

## 2024-05-23 RX ORDER — BUPROPION HYDROCHLORIDE 150 MG/1
150 TABLET, EXTENDED RELEASE ORAL DAILY
Qty: 90 TABLET | Refills: 1 | Status: SHIPPED | OUTPATIENT
Start: 2024-05-23 | End: 2024-08-08

## 2024-05-30 DIAGNOSIS — I50.9 CONGESTIVE HEART FAILURE, UNSPECIFIED HF CHRONICITY, UNSPECIFIED HEART FAILURE TYPE (H): ICD-10-CM

## 2024-05-30 RX ORDER — FUROSEMIDE 20 MG
20 TABLET ORAL 2 TIMES DAILY
Qty: 180 TABLET | Refills: 1 | Status: SHIPPED | OUTPATIENT
Start: 2024-05-30 | End: 2024-08-08

## 2024-07-12 DIAGNOSIS — I50.9 CONGESTIVE HEART FAILURE, UNSPECIFIED HF CHRONICITY, UNSPECIFIED HEART FAILURE TYPE (H): ICD-10-CM

## 2024-07-12 RX ORDER — METOPROLOL SUCCINATE 50 MG/1
50 TABLET, EXTENDED RELEASE ORAL
Qty: 90 TABLET | Refills: 0 | OUTPATIENT
Start: 2024-07-12

## 2024-07-16 ENCOUNTER — VIRTUAL VISIT (OUTPATIENT)
Dept: PALLIATIVE MEDICINE | Facility: OTHER | Age: 64
End: 2024-07-16
Attending: PHYSICIAN ASSISTANT
Payer: COMMERCIAL

## 2024-07-16 DIAGNOSIS — M06.00 SERONEGATIVE EROSIVE RHEUMATOID ARTHRITIS (H): Primary | ICD-10-CM

## 2024-07-16 RX ORDER — FOLIC ACID 1 MG/1
2 TABLET ORAL DAILY
Qty: 180 TABLET | Refills: 3 | Status: SHIPPED | OUTPATIENT
Start: 2024-07-16

## 2024-07-16 NOTE — Clinical Note
She continues to have pain in her hands after being on Humira and methotrexate for 5 months. She is due for office visit with you, so I asked her to schedule a visit. She is having hair loss potentially from the methotrexate, so increasing dose may not be the best option. What do you think about switching Humira to another TNF like Enbrel or even to Orencia? She is also due for labs and I didn't see any ordered, so I ordered what you usually have checked every 3 months and asked her to get them checked as soon as possible.

## 2024-07-16 NOTE — PATIENT INSTRUCTIONS
"Recommendations from today's MTM visit:                                                       Schedule follow up visit with Jessica Dahl by calling [phone]  Schedule follow up visit with Audrey Rosado by calling [phone]  Have labs checked sometime this week    Follow-up: Luis Antonio will reach out after you see Jessiac to schedule our next follow up appointment    It was great speaking with you today.  I value your experience and would be very thankful for your time in providing feedback in our clinic survey. In the next few days, you may receive an email or text message from [facility] [employer] with a link to a survey related to your  clinical pharmacist.\"     To schedule another MTM appointment, please call the clinic directly or you may call the MTM scheduling line at [phone] or toll-free at [phone].     My Clinical Pharmacist's contact information:                                                      Please feel free to contact me with any questions or concerns you have.      Luis Antonio Dee, PharmD  Medication Therapy Management Pharmacist  [facility]  Phone: [phone]   "

## 2024-07-16 NOTE — PROGRESS NOTES
Medication Therapy Management (MTM) Encounter    ASSESSMENT:                            Medication Adherence/Access: No issues identified    Seronegative erosive rheumatoid arthritis: Patient may benefit from changing Humira to another TNF inhibitor or another class like a T-cell inhibitor (Orencia) or IL-6 medication (Actemra). Will discuss options with patient's rheumatologist. Recommend Orencia or a different TNF inhibitor, as Actemra can affect lipid levels and patient has history of diabetes and elevated lipid levels. Also recommend using MONIK inhibitors cautiously due to increased risk of major adverse cardiovascular events. Hair loss is a side effect listed in package insert of methotrexate, recommend switching biologic therapy first and once symptoms are controlled, decrease dose and eventually discontinue methotrexate if possible. Recommend patient continue Humira, methotrexate, and folic acid at current doses at this time. She is due for lab monitoring and office visit with her rheumatologist. She is also due for office visit with her primary care MTM pharmacist.    PLAN:                            Schedule follow up visit with Jessica Dahl by calling [phone]  Schedule follow up visit with Audrey Rosado by calling [phone]  Have labs checked sometime this week    Follow-up: Luis Antonio will reach out after you see Jessica to schedule our next follow up appointment    SUBJECTIVE/OBJECTIVE:                          Rocio Price is a 64 year old female seen for a follow-up visit from 04/05/2024. She was referred to me from Jessica Dahl PA-C.       Reason for visit: Rheumatoid arthritis follow up.    Allergies/ADRs: Reviewed in chart  Past Medical History: Reviewed in chart  Tobacco: She reports that she has never smoked. She has never been exposed to tobacco smoke. She has never used smokeless tobacco.  Alcohol: Less than 1 beverage / month  Caffeine: cup of coffee every morning  Social: her son is  getting  this Saturday    Medication Adherence/Access: no issues reported    Seronegative erosive rheumatoid arthritis:   Humira 40 mg every 14 days in the stomach on Wednesdays (started October 2023, last dose 7/3/2024, next dose 7/17/2024)  Methotrexate 17.5 mg subcutaneously once weekly on Wednesdays  Folic acid 2 mg daily  Acetaminophen 1000 mg twice daily AM and late afternoon    Patient reports the swelling in hands has gone down. She continues to have pain in her hands and knees. The knee pain she attributes to osteoarthritis and states she was told by an orthopedic provider that she likely will need knee replacement surgery to treat that. Received steroid injection in her knees and found this to be helpful for a few months. She rates the pain in her hands as 4-5/10. Finds that there is some increased swelling about 1-2 prior to her next Humira injection. Reports no troubles with administering the injectable medications. She continues to notice hair loss and no other side effects.    Last lab monitoring completed: 2/15/2024    Patient declined to discuss other conditions at this visit as she is also seeing a primary care [facility] pharmacist.     Today's Vitals: LMP  (LMP Unknown)     ----------------    I spent 12 minutes with this patient today. All changes were made via collaborative practice agreement with Jessica Dahl. A copy of the visit note was provided to the patient's provider(s).    A summary of these recommendations was sent via [employer].    Luis Antonio Dee, PharmD  Medication Therapy Management Pharmacist  [facility]  Phone: [phone]    Telemedicine Visit Details  Type of service:  Telephone visit  Start Time: 3:49 PM  End Time: 4:01 PM     Medication Therapy Recommendations  Rheumatoid arthritis involving multiple sites with positive rheumatoid factor (H)    Current Medication: Methotrexate Sodium 250 MG/10ML SOLN   Rationale: Medication requires monitoring  - Needs additional monitoring   Recommendation: Order Lab   Status: Accepted per CPA          Current Medication: Methotrexate Sodium 250 MG/10ML SOLN (Discontinued)   Rationale: Undesirable effect - Adverse medication event - Safety   Recommendation: Increase Dose - folic acid 1 MG tablet   Status: Accepted per Provider   Note: Recommend increasing folic acid to 2 mg daily          Current Medication: adalimumab (HUMIRA *CF*) 40 MG/0.4ML pen kit   Rationale: More effective medication available - Ineffective medication - Effectiveness   Recommendation: Change Medication - ORENCIA CLICKJECT SC   Status: Contact Provider - Awaiting Response

## 2024-07-28 ENCOUNTER — HEALTH MAINTENANCE LETTER (OUTPATIENT)
Age: 64
End: 2024-07-28

## 2024-08-01 NOTE — TELEPHONE ENCOUNTER
A Ozempic dose change request has been made to the [employer] assistance program.    A 120 day supply of OZEMPIC 0.5 mg should arrive to [facility] within 10-14 business days. Please contact Rocio to .    Thanks so much for your help!    Renetta Haro  Prescription Assistance Admin

## 2024-08-05 ENCOUNTER — TELEPHONE (OUTPATIENT)
Dept: RHEUMATOLOGY | Facility: CLINIC | Age: 64
End: 2024-08-05

## 2024-08-05 NOTE — PROGRESS NOTES
Rocio Price is a 64 year old who is being evaluated via a billable video visit.      How would you like to obtain your AVS? Ashlee  Will anyone else be joining your video visit? No  Will you be in [place] for the visit?  Yes  How would you like to enter the visit?  Ashlee      Video-Visit Details  Visit start time: 9:20am  Visit end time: 9:26am     Type of service:  Video Visit   Originating Location (pt. Location): Home  Distant Location (provider location):  On-site  Platform used for Video Visit: [facility]        Rheumatology Clinic Visit  [facility]  Jessica HeshamVINCE     Rocio Price MRN# [id number]   YOB: 1960 Age: 64 year old   Date of Visit: 8/12/2024  Primary care provider: Behzad Correa          Assessment and Plan:     1.  Seronegative erosive rheumatoid arthritis  2.  High-risk medication use    Patient presents today to for follow up for her seronegative erosive rheumatoid arthritis.  She does note improvement with the increased dose of methotrexate to her Humira however she continues to notice symptoms including swelling and aching in her hands.  Her right hand swelling has improved but she has noticed an increase in her left hand.  She states that she is having a lot of knee pain but does plan to see a provider for this.  She did increase her folic acid to 2 mg a day due to noticing some hair loss.  At this time given the increase symptoms that she is noticing in her hands as well as her feet we will have her increase her methotrexate to 0.9 mL subcu weekly.  She will continue on the Humira every 14 days.  If she continues to notice swelling and increased pain may need to consider changing her Humira to a different biologic.  Patient follow-up with me in 3 months.  She will get labs as soon as possible as she is quite overdue for them.     The longitudinal plan of care for the diagnosis(es)/condition(s) as documented were addressed during this visit. Due  to the added complexity in care, I will continue to support Rocio in the subsequent management and with ongoing continuity of care.    Plan:     Schedule follow-up with Jessica Dahl PA-C in 3 months.   Labs:  CBC, Creatinine, Albumin, AST, ALT, CRP and Sed Rate as soon as possible  Medication recommendations:   Increase Methotrexate 22.5mg (0.9mL SubQ) weekly.   WEEKLY. While on Methotrexate:  -check labs (ALT/AST, albumin, CBC with platelets and creatinine) every 3 months  -Limit alcohol to 2 drinks weekly  -Take Folic Acid 2mg daily   -Tylenol 500-1000mg can be used as needed up to three times daily for nausea/headache associated with dosing  Continue Humira 40mg/0.4mL SubQ every 14 days.    VINCE Burgess  Rheumatology         History of Present Illness:   Rocio Price presents for evaluation of rheumatoid arthritis.    Rheumatological history:  Provider(s): Dr. Wallace  Pertinent lab history: HLA B27 positive, elevated Sed Rate and CRP, negative MYRON,  negative CCP and RF  MRI showed erosions and synovitis  Previous medications tried: Prednisone,  Methotrexate  Current medications: Humira every 14 days, Methotrexate 0.6mL weekly    Interval history August 12, 2024:  She states that her joints are a little better, she still has swelling and her joints are still achy. She notices a burning in her joints. She states that her left hand has gotten worse. Her right hand may be a little better, but still having swelling. She states that her knees have been more painful as well. She is looking to get seen for that. Her feet have been bothering her as well and she finds it difficult to walk at times.     Interval history February 12, 2024:   She states that overall things have been a little bit better. Most of her arthritis has been in her right hand. She states that her left hand is a little worse, but is better with restarting the Methotrexate. She states that she has been noticing increased pain in her  knees. At times she does have swelling as well. She has been recommended in the past to get xrays and was told that she has arthritis. She continues to have swelling in her fingers as well.    Interval history November 13, 2023:  She states that she continues to have joint pain and swelling. She states that she is hurting more around the left ankle on the medial aspect and the lateral aspect on the right.  The left hand has not been as bad, but the right hand throbs and hurts.     HPI from consult of August 23, 2023:  She was diagnosed in 2021. She was put on Methotrexate and has been off of that for about 6 months. She has been having increased pain and swelling. She states that her left hand has been getting worse. She did not feel the methotrexate was working for her. She was given Prednisone, which was slightly helpful. She states that [place] was discussing putting her on a different medication, but she cannot remember which medication it was. She gets pain from her right shoulder down the right arm. She is unable to sleep on her right side. She finds that she will get numbness down the arm as well. The right elbow will get swollen, and the swelling and tenderness can go down the arm as well. No shortness of breath.     No history of infections. No history of MI's or blood clots. She does have high blood pressure and diabetes. She has congestive heart failure. She has a history of diverticulitis.                Review of Systems:     Constitutional: negative  Skin: negative  Eyes: negative  Ears/Nose/Throat: negative  Respiratory: No shortness of breath, dyspnea on exertion, cough, or hemoptysis  Cardiovascular: negative  Gastrointestinal: negative  Genitourinary: negative  Musculoskeletal: as above  Neurologic: negative  Psychiatric: negative  Hematologic/Lymphatic/Immunologic: negative  Endocrine: negative         Active Problem List:     Patient Active Problem List    Diagnosis Date Noted    Acute pain of both  shoulders 10/23/2023     Priority: Medium    Atherosclerotic heart disease of native coronary artery without angina pectoris 10/27/2022     Priority: Medium     Formatting of this note might be different from the original.  moderate LAD stenosis by CT angiography      History of malignant neoplasm of breast 10/27/2022     Priority: Medium     Formatting of this note might be different from the original.  2013      History of squamous cell carcinoma of skin 10/27/2022     Priority: Medium    Rheumatoid arthritis involving multiple sites with positive rheumatoid factor (H) 10/27/2022     Priority: Medium    Moderate persistent asthma 02/04/2022     Priority: Medium     Last Assessment & Plan:   Formatting of this note might be different from the original.  Patient seems to be doing fairly well on her current regimen of Symbicort and albuterol.  Will continue to monitor.  Consider repeat PFTs further out from acute illness.      Depression 01/13/2022     Priority: Medium    Gastroesophageal reflux disease 01/13/2022     Priority: Medium    Hyperlipidemia 01/13/2022     Priority: Medium    Migraine headache 01/12/2022     Priority: Medium     Formatting of this note might be different from the original.  Associated with photophobia. Treated with over-the-counter medications, no prescriptions.      History of severe acute respiratory syndrome coronavirus 2 (SARS-CoV-2) disease 11/19/2021     Priority: Medium    Neuropathy, peripheral 02/04/2020     Priority: Medium    Congestive heart failure (H) 12/19/2018     Priority: Medium     Formatting of this note might be different from the original.  preserved ejection fraction, EF 60% ECHO 12/14/18    Last Assessment & Plan:   Formatting of this note might be different from the original.  HFpEF, EF 60% on Echo 12/14/2018  Continue aspirin 81 mg  Continue metoprolol succinate 50mg  Continue furosemide 40 mg daily      Type 2 diabetes mellitus with hyperglycemia, with long-term  current use of insulin (H) 08/28/2017     Priority: Medium     Formatting of this note is different from the original.  Current Meds: Aspart insulin 6-10 units with meals as needed. Insulin glargine 50 units at bedtime. Metformin 1000 mg BID.     [place] D 4/5 (A1c)  Blood pressure: /64 (BP Location: Right arm, Patient Position: Sitting, Cuff Size: Large)  prior: 100/65  Statin: Atorvastatin 40mg   Aspirin: 81 mg daily  Tobacco: non-smoker  A1c:   Lab Results   Component Value Date    HGBA1C 10.5 (H) 01/11/2022    HGBA1C 10.8 (H) 09/08/2020    HGBA1C 12.6 (H) 01/10/2020     Renal Health: Stage 2 (eGFR 60-89)  Lab Results   Component Value Date    EGFRNONBLKAA 75 01/13/2022     Lab Results   Component Value Date    UMCROALBCONC <5.0 08/25/2017    CREATININEUR 108 01/10/2020    ALBCREARATIO <5 01/10/2020         Last Assessment & Plan:   Formatting of this note might be different from the original.  She has made great improvement since last check a few weeks ago both in diet and activity levels. This is greatly reflected in her blood sugar readings. I don't think we need to add additional medications at this time for diabetic control. Will recheck A1c in 2 months.      Morbid obesity (H) 04/14/2017     Priority: Medium    Female cystocele 06/03/2016     Priority: Medium    Obstructive sleep apnea syndrome in adult 11/11/2014     Priority: Medium     Last Assessment & Plan:   Formatting of this note might be different from the original.  Given evidence of significant CO2 retention despite improving respiratory symptoms and utilization of CPAP during hospitalization, will refer back to sleep medicine for consideration of adjustment of CPAP vs BiPAP.      Status post bilateral mastectomy 03/11/2014     Priority: Medium    Seasonal allergies 09/09/2013     Priority: Medium    Restless leg syndrome 11/17/2010     Priority: Medium    Sensorineural hearing loss (SNHL) of both ears 11/17/2010     Priority: Medium     Lichen sclerosus 09/14/2007     Priority: Medium            Past Medical History:   No past medical history on file.  Past Surgical History:   Procedure Laterality Date    HYSTERECTOMY, PAP NO LONGER INDICATED      HYSTERECTOMY, PAP NO LONGER INDICATED      MASTECTOMY, BILATERAL              Social History:     Social History     Socioeconomic History    Marital status:      Spouse name: Not on file    Number of children: Not on file    Years of education: Not on file    Highest education level: Not on file   Occupational History    Not on file   Tobacco Use    Smoking status: Never     Passive exposure: Never    Smokeless tobacco: Never   Substance and Sexual Activity    Alcohol use: Not Currently    Drug use: Never    Sexual activity: Not on file   Other Topics Concern    Not on file   Social History Narrative    Not on file     Social Determinants of Health     Financial Resource Strain: Unknown (10/11/2023)    Financial Resource Strain     Within the past 12 months, have you or your family members you live with been unable to get utilities (heat, electricity) when it was really needed?: Patient refused   Food Insecurity: Unknown (10/11/2023)    Food Insecurity     Within the past 12 months, did you worry that your food would run out before you got money to buy more?: Patient refused     Within the past 12 months, did the food you bought just not last and you didn t have money to get more?: Patient refused   Transportation Needs: Unknown (10/11/2023)    Transportation Needs     Within the past 12 months, has lack of transportation kept you from medical appointments, getting your medicines, non-medical meetings or appointments, work, or from getting things that you need?: Patient refused   Physical Activity: Inactive (5/6/2022)    [facility], [facility]    Exercise Vital Sign     Days of Exercise per Week: 0 days     Minutes of Exercise per Session: 0 min   Stress: Stress Concern Present  (5/6/2022)    [facility], [facility]    [employer] [place] [employer] Occup[employer] - Occupational Stress Questionnaire     Feeling of Stress : To some extent   Social Connections: Socially Integrated (5/6/2022)    Received from [facility], [facility]    Social Connection and Isolation Panel [NHANES]     Frequency of Communication with Friends and Family: More than three times a week     Frequency of Social Gatherings with Friends and Family: Once a week     Attends Islam Services: 1 to 4 times per year     Active Member of Clubs or Organizations: Yes     Attends Club or Organization Meetings: 1 to 4 times per year     Marital Status:    Interpersonal Safety: Unknown (10/12/2023)    Interpersonal Safety     Do you feel physically and emotionally safe where you currently live?: Patient refused     Within the past 12 months, have you been hit, slapped, kicked or otherwise physically hurt by someone?: Patient refused     Within the past 12 months, have you been humiliated or emotionally abused in other ways by your partner or ex-partner?: Patient refused   Housing Stability: Unknown (10/11/2023)    Housing Stability     Do you have housing? : Patient refused     Are you worried about losing your housing?: Patient refused          Family History:   No family history on file.         Allergies:     Allergies   Allergen Reactions    Seasonal Allergies Other (See Comments)     Itchy watery eyes            Medications:     Current Outpatient Medications   Medication Sig Dispense Refill    acetaminophen (TYLENOL) 500 MG tablet Take 500-1,000 mg by mouth every 6 hours as needed for mild pain      adalimumab (HUMIRA *CF*) 40 MG/0.4ML pen kit Inject 0.4 mLs (40 mg) Subcutaneous every 14 days Hold for signs of infection, then seek medical attention. 1 each 5    aspirin (ASA) 81 MG EC tablet Take 1 tablet (81 mg) by mouth daily 90 tablet 3    atorvastatin (LIPITOR) 40 MG tablet TAKE ONE TABLET BY MOUTH  "EVERY DAY , DUE FOR APPOINTMENT, PLESE ARIELLA SALEEM 90 tablet 3    budesonide-formoterol (SYMBICORT) 80-4.5 MCG/ACT Inhaler Inhale 1-2 puffs as needed. May use up to 12 puffs per day. 20.4 g 3    buPROPion (WELLBUTRIN SR) 150 MG 12 hr tablet TAKE 1 TABLET BY MOUTH DAILY 90 tablet 1    citalopram (CELEXA) 20 MG tablet Take 1 tablet (20 mg) by mouth daily 90 tablet 1    clobetasol (TEMOVATE) 0.05 % external ointment Apply topically 2 times daily as needed      Continuous Blood Gluc Sensor (FREESTYLE REYNOLD 3 SENSOR) MISC 1 each every 14 days 6 each 3    dapagliflozin (FARXIGA) 5 MG TABS tablet Take 1 tablet (5 mg) by mouth daily 90 tablet 1    dicyclomine (BENTYL) 10 MG capsule TAKE 1 CAPSULE BY MOUTH FOUR TIMES A DAY AS NEEDED FOR ABDOMINAL PAIN OR CRAMPS      folic acid (FOLVITE) 1 MG tablet Take 2 tablets (2 mg) by mouth daily 180 tablet 3    furosemide (LASIX) 20 MG tablet TAKE ONE TABLET BY MOUTH TWICE DAILY 180 tablet 1    gabapentin (NEURONTIN) 300 MG capsule Take 1 capsule (300 mg) by mouth 3 times daily 270 capsule 1    glucose-vitamin C 4-6 GM-MG CHEW Take 4 g by mouth      insulin aspart (NOVOLOG FLEXPEN RELION) 100 UNIT/ML pen Inject 10 Units Subcutaneous 3 times daily (with meals) (Patient taking differently: Inject 16 Units Subcutaneous 3 times daily (with meals)) 15 mL 3    insulin syringe-needle U-100 (30G X 1/2\" 0.3 ML) 30G X 1/2\" 0.3 ML miscellaneous Use 4 syringes daily or as directed. For insulin. 360 each 11    ipratropium - albuterol 0.5 mg/2.5 mg/3 mL (DUONEB) 0.5-2.5 (3) MG/3ML neb solution Inhale 1 vial (3 mLs) into the lungs every 6 hours as needed for shortness of breath, wheezing or cough 90 mL 3    LANTUS SOLOSTAR 100 UNIT/ML soln INJECT 40 UNITS UNDER THE SKIN ONCE DAILY AT BEDTIME 15 mL 2    loratadine-pseudoePHEDrine (CLARITIN-D 12-HOUR) 5-120 MG 12 hr tablet Take 1 tablet by mouth daily      melatonin 5 MG tablet Take 5 mg by mouth At Bedtime      metFORMIN (GLUCOPHAGE) 1000 MG tablet Take 1 " "tablet (1,000 mg) by mouth 2 times daily (with meals) 180 tablet 3    Methotrexate Sodium 250 MG/10ML SOLN Inject 0.7 mLs subcutaneously once a week 4 mL 0    metoprolol succinate ER (TOPROL XL) 50 MG 24 hr tablet Take 1 tablet (50 mg) by mouth daily 90 tablet 1    montelukast (SINGULAIR) 10 MG tablet Take 1 tablet (10 mg) by mouth at bedtime 90 tablet 0    pantoprazole (PROTONIX) 40 MG EC tablet TAKE 1 TABLET BY MOUTH DAILY 90 tablet 3    pramipexole (MIRAPEX) 0.125 MG tablet TAKE TWO TABLETS BY MOUTH EVERY DAY IN THE AFTERNOON & TAKE TWO TABLETS BY MOUTH EVERY EVENING 360 tablet 1    semaglutide (OZEMPIC) 2 MG/3ML pen Inject 0.5 mg subcutaneously every 7 days 9 mL 0    sodium chloride (NEBUSAL) 3 % neb solution Inhale 4 mLs into the lungs      syringe/needle, sisp, (B-D SYRINGE/NEEDLE) 25G X 5/8\" 1 ML MISC 1 each once a week To inject methotrexate 12 each 0    valsartan (DIOVAN) 160 MG tablet Take 1 tablet by mouth once daily 90 tablet 0            Physical Exam:   There were no vitals taken for this visit.  Wt Readings from Last 6 Encounters:   24 115.8 kg (255 lb 6.4 oz)   24 117.9 kg (260 lb)   24 117.9 kg (260 lb)   11/10/23 117.9 kg (260 lb)   10/12/23 115.7 kg (255 lb)   10/12/23 115.9 kg (255 lb 9.6 oz)     Constitutional: well-developed, appearing stated age; cooperative  Eyes: nl conjunctiva, sclera  ENT: nl external ears, nose, hearing, lips,   Neck: no mass or thyroid enlargement  Resp: No shortness of breath with normal conversation  Skin: no nail pitting, alopecia, rash, nodules or lesions.   Psych: nl judgement, orientation, memory, affect.           Data:   Imagin2022 MR Hand Right without and with IV contrast  IMPRESSION:  1. Although there is a background of mild scattered degenerative arthritis in the right hand and  wrist, there are erosions in the 2nd and 3rd metacarpal heads and diffuse enhancing synovitis including the MCP joints, out of proportion to degenerative " changes. Findings suggest a superimposed inflammatory process.  2. Mild tenosynovial enhancement involving the flexor and extensor tendons of the hand and wrist, which may also be inflammatory.  3. Nonspecific enlargement and T2 hyperintensity of the median nerve with enhancement; this can be  seen in the setting of carpal tunnel syndrome.    04/26/2022   DX ANKLE BILATERAL 3+ VIEWS  IMPRESSION:  Calcaneal spurs. Slight degenerative arthritis both ankles. No erosions. Postoperative  changes bilateral 5th metatarsals.     DX Hand Bilateral 2 Views  IMPRESSION:  Mild scattered degenerative arthritis of both hands. No erosions.     03/30/2022 CT Chest without IV contrast  IMPRESSION:  Interval improvement in the previously new bilateral upper lung predominant groundglass opacities with a couple of areas mild ill-defined residual groundglass, likely due to a resolving infectious/inflammatory process. Interval resolution of the previously new solid nodular consolidative opacities in the right lower lobe with improvement in right lower lobe endobronchial  plugging, also compatible with an infectious/inflammatory etiology. Tiny indeterminate 4 mm posterior right upper lobe ground glass nodule which is unchanged from 02/12/2022, but new since 03/19/2019    Cardiac:  07/01/2022 TTE   Final Impressions  1. Mildly enlarged left ventricular chamber size, no regional wall motion abnormalities, calculated 2-D four chamber monoplane volumetric ejection fraction 60%.  2. Normal right ventricular chamber size, normal systolic function, estimated right ventricular systolic pressure 48 mmHg (right atrial pressure of 10 mmHg).  3. Mildly thickened aortic valve with trivial AR  4. Tiny posterior pericardial effusion.    Noted RVSP of 48.    12/14/2018 Stress Test Echo  Positive for septal and apical myocardial ischemia    12/10/2018: CT Cardiac Angiogram triple rule out with IV contrast  IMPRESSION:   1. Mild pulmonary edema, and  additional right perihilar patchy opacities. These  could represent infectious/inflammatory etiology or possibly asymmetric edema.  2. 1.3 cm semisolid nodule in the right upper lung. This is likely secondary to  #1 above, recommend follow-up in 3-6 months to confirm resolution.  3. Moderate stenosis of the proximal LAD. CAD-RADS category 3. Normal systolic  LV function with no regional wall motion abnormalities.  4. Negative for acute pulmonary embolism.    04/05/2022 ECG: QTc 441, p-mitrale     11/18/2022 bone density scan  IMPRESSION: NORMAL. Bone mineral density measurements are within normal limits using T score.     Laboratory:  10/27/2022  Creatinine 0.70, GFR greater than 90  Hemoglobin A1c 11.2  TSH 1.91  White blood cell count 12.0, hemoglobin 13.1, platelet count 334    6/15/2023  Creatinine 0.74, GFR 90  Hemoglobin A1c 9.9    8/23/2023  Creatinine 1.08, GFR 57  ALT 26, AST 26  CRP 8.07  White blood cell count 10.5, hemoglobin 13.6, platelet count 343  TB negative  Hepatitis B and hepatitis C nonreactive    10/12/2023  Creatinine 0.80      2/15/2024  Creatinine 0.85, GFR 76  Albumin 4.3  ALT 33, AST 34  CRP 3.77  White blood cell count 8.3, hemoglobin portion 46, plate count 326  Sed rate 15

## 2024-08-05 NOTE — TELEPHONE ENCOUNTER
Prior Authorization Approval    Medication: HUMIRA *CF* PEN 40 MG/0.4ML SC PNKT  Authorization Effective Date: 8/5/2024  Authorization Expiration Date: 2/5/2025  Approved Dose/Quantity: 2 pens per 28 days  Reference #: [id number]   Insurance Company: [employer] Part D - Phone [phone] Fax [phone]  Expected CoPay: $ 2,159.07  CoPay Card Available:      Financial Assistance Needed: Sending [employer]  Which Pharmacy is filling the prescription:    Pharmacy Notified: No  Patient Notified: Yes

## 2024-08-05 NOTE — TELEPHONE ENCOUNTER
PA Initiation    Medication: HUMIRA *CF* PEN 40 MG/0.4ML SC PNKT  Insurance Company: [employer] Part D - Phone [phone] Fax [phone]  Pharmacy Filling the Rx:    Filling Pharmacy Phone:    Filling Pharmacy Fax:    Start Date: 8/5/2024    [id number]

## 2024-08-08 DIAGNOSIS — E78.5 HYPERLIPIDEMIA LDL GOAL <100: ICD-10-CM

## 2024-08-08 DIAGNOSIS — J45.40 MODERATE PERSISTENT ASTHMA WITHOUT COMPLICATION: ICD-10-CM

## 2024-08-08 DIAGNOSIS — I50.9 CONGESTIVE HEART FAILURE, UNSPECIFIED HF CHRONICITY, UNSPECIFIED HEART FAILURE TYPE (H): ICD-10-CM

## 2024-08-08 DIAGNOSIS — G25.81 RESTLESS LEGS SYNDROME (RLS): ICD-10-CM

## 2024-08-08 DIAGNOSIS — K21.00 GASTROESOPHAGEAL REFLUX DISEASE WITH ESOPHAGITIS WITHOUT HEMORRHAGE: ICD-10-CM

## 2024-08-08 DIAGNOSIS — F33.0 MILD EPISODE OF RECURRENT MAJOR DEPRESSIVE DISORDER (H): ICD-10-CM

## 2024-08-08 DIAGNOSIS — G62.9 PERIPHERAL POLYNEUROPATHY: ICD-10-CM

## 2024-08-08 RX ORDER — BUPROPION HYDROCHLORIDE 150 MG/1
150 TABLET, EXTENDED RELEASE ORAL DAILY
Qty: 90 TABLET | Refills: 0 | Status: SHIPPED | OUTPATIENT
Start: 2024-08-08

## 2024-08-08 RX ORDER — ATORVASTATIN CALCIUM 40 MG/1
TABLET, FILM COATED ORAL
Qty: 90 TABLET | Refills: 0 | Status: SHIPPED | OUTPATIENT
Start: 2024-08-08

## 2024-08-08 RX ORDER — MONTELUKAST SODIUM 10 MG/1
10 TABLET ORAL AT BEDTIME
Qty: 90 TABLET | Refills: 0 | Status: SHIPPED | OUTPATIENT
Start: 2024-08-08

## 2024-08-08 RX ORDER — CITALOPRAM HYDROBROMIDE 20 MG/1
20 TABLET ORAL DAILY
Qty: 90 TABLET | Refills: 0 | Status: SHIPPED | OUTPATIENT
Start: 2024-08-08

## 2024-08-08 RX ORDER — PANTOPRAZOLE SODIUM 40 MG/1
40 TABLET, DELAYED RELEASE ORAL DAILY
Qty: 90 TABLET | Refills: 0 | Status: SHIPPED | OUTPATIENT
Start: 2024-08-08

## 2024-08-08 RX ORDER — PRAMIPEXOLE DIHYDROCHLORIDE 0.12 MG/1
TABLET ORAL
Qty: 360 TABLET | Refills: 0 | Status: SHIPPED | OUTPATIENT
Start: 2024-08-08

## 2024-08-08 RX ORDER — GABAPENTIN 300 MG/1
300 CAPSULE ORAL 3 TIMES DAILY
Qty: 270 CAPSULE | Refills: 0 | Status: SHIPPED | OUTPATIENT
Start: 2024-08-08

## 2024-08-08 RX ORDER — METOPROLOL SUCCINATE 50 MG/1
50 TABLET, EXTENDED RELEASE ORAL DAILY
Qty: 90 TABLET | Refills: 0 | Status: SHIPPED | OUTPATIENT
Start: 2024-08-08

## 2024-08-08 RX ORDER — FUROSEMIDE 20 MG
20 TABLET ORAL 2 TIMES DAILY
Qty: 180 TABLET | Refills: 0 | Status: SHIPPED | OUTPATIENT
Start: 2024-08-08

## 2024-08-12 ENCOUNTER — VIRTUAL VISIT (OUTPATIENT)
Dept: RHEUMATOLOGY | Facility: CLINIC | Age: 64
End: 2024-08-12
Attending: PHYSICIAN ASSISTANT
Payer: COMMERCIAL

## 2024-08-12 DIAGNOSIS — Z79.899 HIGH RISK MEDICATION USE: ICD-10-CM

## 2024-08-12 DIAGNOSIS — M06.00 SERONEGATIVE EROSIVE RHEUMATOID ARTHRITIS (H): Primary | ICD-10-CM

## 2024-08-12 PROCEDURE — G2211 COMPLEX E/M VISIT ADD ON: HCPCS | Performed by: PHYSICIAN ASSISTANT

## 2024-08-12 PROCEDURE — 99214 OFFICE O/P EST MOD 30 MIN: CPT | Mod: 95 | Performed by: PHYSICIAN ASSISTANT

## 2024-08-12 NOTE — PATIENT INSTRUCTIONS
SHANI Herrera Visit Instructions:     Thank you for coming to [facility] for your care. It is my goal to partner with you to help you reach your optimal state of health.       Plan:     Schedule follow-up with Jessica Dahl PA-C in 3 months.   Labs:  CBC, Creatinine, Albumin, AST, ALT, CRP and Sed Rate as soon as possible  Medication recommendations:   Increase Methotrexate 22.5mg (0.9mL SubQ) weekly.   WEEKLY. While on Methotrexate:  -check labs (ALT/AST, albumin, CBC with platelets and creatinine) every 3 months  -Limit alcohol to 2 drinks weekly  -Take Folic Acid 2mg daily   -Tylenol 500-1000mg can be used as needed up to three times daily for nausea/headache associated with dosing  Continue Humira 40mg/0.4mL SubQ every 14 days.           Jessica Dahl PA-C  [facility]  [facility]    Contact information: [facility]  Clinic Number:  [phone]  Please call or send a [employer] message with any questions about your care

## 2024-08-12 NOTE — TELEPHONE ENCOUNTER
Patient responded to MyChart - Pursue FPAP:    Called patient and left voicemail requesting call back to discuss and sent TalasimharPerioSeal message with my contact information.    Thank You,     Debbie Teran [facility]  Specialty Pharmacy Clinic Liaison [facility] Special[facility]  [email or web address]  [email or web address]  Phone: [phone]  Fax: [phone]

## 2024-08-13 ENCOUNTER — LAB (OUTPATIENT)
Dept: LAB | Facility: CLINIC | Age: 64
End: 2024-08-13
Payer: COMMERCIAL

## 2024-08-13 DIAGNOSIS — E11.65 TYPE 2 DIABETES MELLITUS WITH HYPERGLYCEMIA, WITH LONG-TERM CURRENT USE OF INSULIN (H): Primary | ICD-10-CM

## 2024-08-13 DIAGNOSIS — M06.00 SERONEGATIVE EROSIVE RHEUMATOID ARTHRITIS (H): ICD-10-CM

## 2024-08-13 DIAGNOSIS — Z79.4 TYPE 2 DIABETES MELLITUS WITH HYPERGLYCEMIA, WITH LONG-TERM CURRENT USE OF INSULIN (H): Primary | ICD-10-CM

## 2024-08-13 LAB
ALBUMIN SERPL BCG-MCNC: 4.2 G/DL (ref 3.5–5.2)
ALT SERPL W P-5'-P-CCNC: 49 U/L (ref 0–50)
ANION GAP SERPL CALCULATED.3IONS-SCNC: 13 MMOL/L (ref 7–15)
AST SERPL W P-5'-P-CCNC: 46 U/L (ref 0–45)
BASOPHILS # BLD AUTO: 0 10E3/UL (ref 0–0.2)
BASOPHILS NFR BLD AUTO: 0 %
BUN SERPL-MCNC: 20.3 MG/DL (ref 8–23)
CALCIUM SERPL-MCNC: 9.5 MG/DL (ref 8.8–10.4)
CHLORIDE SERPL-SCNC: 99 MMOL/L (ref 98–107)
CREAT SERPL-MCNC: 0.88 MG/DL (ref 0.51–0.95)
CREAT UR-MCNC: 16.8 MG/DL
CRP SERPL-MCNC: 7.34 MG/L
EGFRCR SERPLBLD CKD-EPI 2021: 73 ML/MIN/1.73M2
EOSINOPHIL # BLD AUTO: 0.3 10E3/UL (ref 0–0.7)
EOSINOPHIL NFR BLD AUTO: 5 %
ERYTHROCYTE [DISTWIDTH] IN BLOOD BY AUTOMATED COUNT: 16.7 % (ref 10–15)
ERYTHROCYTE [SEDIMENTATION RATE] IN BLOOD BY WESTERGREN METHOD: 21 MM/HR (ref 0–30)
GLUCOSE SERPL-MCNC: 354 MG/DL (ref 70–99)
HBA1C MFR BLD: 9.6 % (ref 0–5.6)
HCO3 SERPL-SCNC: 28 MMOL/L (ref 22–29)
HCT VFR BLD AUTO: 45.2 % (ref 35–47)
HGB BLD-MCNC: 14.6 G/DL (ref 11.7–15.7)
IMM GRANULOCYTES # BLD: 0 10E3/UL
IMM GRANULOCYTES NFR BLD: 0 %
LYMPHOCYTES # BLD AUTO: 3.6 10E3/UL (ref 0.8–5.3)
LYMPHOCYTES NFR BLD AUTO: 54 %
MCH RBC QN AUTO: 28.9 PG (ref 26.5–33)
MCHC RBC AUTO-ENTMCNC: 32.3 G/DL (ref 31.5–36.5)
MCV RBC AUTO: 89 FL (ref 78–100)
MICROALBUMIN UR-MCNC: <12 MG/L
MICROALBUMIN/CREAT UR: NORMAL MG/G{CREAT}
MONOCYTES # BLD AUTO: 0.8 10E3/UL (ref 0–1.3)
MONOCYTES NFR BLD AUTO: 12 %
NEUTROPHILS # BLD AUTO: 1.9 10E3/UL (ref 1.6–8.3)
NEUTROPHILS NFR BLD AUTO: 29 %
NRBC # BLD AUTO: 0 10E3/UL
NRBC BLD AUTO-RTO: 0 /100
PLATELET # BLD AUTO: 300 10E3/UL (ref 150–450)
POTASSIUM SERPL-SCNC: 4 MMOL/L (ref 3.4–5.3)
RBC # BLD AUTO: 5.06 10E6/UL (ref 3.8–5.2)
SODIUM SERPL-SCNC: 140 MMOL/L (ref 135–145)
WBC # BLD AUTO: 6.6 10E3/UL (ref 4–11)

## 2024-08-13 PROCEDURE — 84450 TRANSFERASE (AST) (SGOT): CPT

## 2024-08-13 PROCEDURE — 80048 BASIC METABOLIC PNL TOTAL CA: CPT

## 2024-08-13 PROCEDURE — 84460 ALANINE AMINO (ALT) (SGPT): CPT

## 2024-08-13 PROCEDURE — 82570 ASSAY OF URINE CREATININE: CPT

## 2024-08-13 PROCEDURE — 85025 COMPLETE CBC W/AUTO DIFF WBC: CPT

## 2024-08-13 PROCEDURE — 82040 ASSAY OF SERUM ALBUMIN: CPT

## 2024-08-13 PROCEDURE — 82043 UR ALBUMIN QUANTITATIVE: CPT

## 2024-08-13 PROCEDURE — 85652 RBC SED RATE AUTOMATED: CPT

## 2024-08-13 PROCEDURE — 36415 COLL VENOUS BLD VENIPUNCTURE: CPT

## 2024-08-13 PROCEDURE — 86140 C-REACTIVE PROTEIN: CPT

## 2024-08-13 PROCEDURE — 83036 HEMOGLOBIN GLYCOSYLATED A1C: CPT

## 2024-08-13 NOTE — TELEPHONE ENCOUNTER
Patient called back after 4pm 8/12 and LVM to discuss FPAP.    Called and spoke to patient:    FPAP Eligibility:  Household size: 2  Annual household income below $102,000    Patient will email [employer] 1040 from 2023 to me at [email or web address]    Thank You,     Debbie Teran [facility]  Specialty Pharmacy Clinic [facility]  [email or web address]  [email or web address]  Phone: [phone]  Fax: [phone]

## 2024-08-14 DIAGNOSIS — Z79.899 HIGH RISK MEDICATION USE: ICD-10-CM

## 2024-08-14 DIAGNOSIS — M06.00 SERONEGATIVE EROSIVE RHEUMATOID ARTHRITIS (H): Primary | ICD-10-CM

## 2024-08-14 NOTE — TELEPHONE ENCOUNTER
Patient emailed income documents.    Not eligible for financial assistance.    After [employer] income loss of -12,356 HARRISON= $116,320   (%)    Called and LVM for patient to discuss costs.    Thank You,     Debbie Teran [facility]  Specialty Pharmacy Clinic Liaison [facility]  [email or web address]  [email or web address]  Phone: [phone]  Fax: [phone]

## 2024-08-19 NOTE — TELEPHONE ENCOUNTER
Called patient to discuss: above income eligibility for PAP/FPAP    She is approved for Free Drug through 09/08/2024.    She will not be able to afford Humira through remainder of 2024. (Medicare part D out of pocket maximum $3300 does not make sense for last 3 months of calendar year when it will reset 01/01/2025)    Patient plans to re-start Humira (or alternative) January 2025 when OOP resets and goes down to $2000. Patient plans to sign up for the Medicare Prescription Payment plan which gives the option to pay out-of-pocket costs in the form of capped monthly payments instead of all at once at the pharmacy.    Thank You,     Debbie Teran [facility]  Specialty Pharmacy Clinic [facility]  [email or web address]  [email or web address]  Phone: [phone]  Fax: [phone]

## 2024-08-20 ENCOUNTER — PATIENT OUTREACH (OUTPATIENT)
Dept: FAMILY MEDICINE | Facility: CLINIC | Age: 64
End: 2024-08-20
Payer: COMMERCIAL

## 2024-08-20 NOTE — TELEPHONE ENCOUNTER
Received a vm on PAL RN line,     -Pt calling to ask about a form regarding pt assistance for ozempic. She is calling to ask if form has been completed.     -She is requesting a call back.     Filomena BOJORQUEZ, RN   Clinic RN  [facility]

## 2024-08-21 NOTE — TELEPHONE ENCOUNTER
- 08/21/2024 at 1:29 PM, clinic receives [facility] Shipment of prescriptions for patient:     Shipment Information      - Packing List container: [id number]      - Order #: [id number]     Product      - Ozempic - 1 3 ML prefilled pens      - Strength: .68 mg/ml      - Lot # [id number]      - Expiration Date: 12-      - Unit Quantity: 5     Clinic Action  - Prescription placed in vaccine refrigerator with patient label awaiting patient .   - Call placed to patient to inform that scripts are ready to be picked up.     Pt verbalizes understanding and is agreeable with plan. She states she will  in the afternoon on 8/21. Pt denies any further questions or concerns at this time.     Filomena BOJORQUEZ RN   Clinic RN  [facility]

## 2024-08-21 NOTE — TELEPHONE ENCOUNTER
Notified by  staff that the pt assistance medication was picked up.     Filomena BOJORQUEZ RN   Clinic RN  [facility]

## 2024-08-26 ENCOUNTER — MYC REFILL (OUTPATIENT)
Dept: FAMILY MEDICINE | Facility: CLINIC | Age: 64
End: 2024-08-26
Payer: COMMERCIAL

## 2024-08-26 DIAGNOSIS — E11.65 TYPE 2 DIABETES MELLITUS WITH HYPERGLYCEMIA, WITH LONG-TERM CURRENT USE OF INSULIN (H): ICD-10-CM

## 2024-08-26 DIAGNOSIS — Z79.4 TYPE 2 DIABETES MELLITUS WITH HYPERGLYCEMIA, WITH LONG-TERM CURRENT USE OF INSULIN (H): ICD-10-CM

## 2024-08-27 RX ORDER — DAPAGLIFLOZIN 5 MG/1
5 TABLET, FILM COATED ORAL DAILY
Qty: 90 TABLET | Refills: 0 | Status: SHIPPED | OUTPATIENT
Start: 2024-08-27

## 2024-08-27 NOTE — TELEPHONE ENCOUNTER
Outgoing call to patient.   Requests refill on Farxiga. Patient states in the past, we faxed it to a [facility] pharmacy that delivers it to her. Their fax number is [phone]. Please sign printed order so we can fax it to her pharmacy.    Has upcoming visit with Behzad Correa PA-C:  9/5/2024 10:30 AM (Arrive by 10:10 AM) Behzad Correa PA-C [facility]       Rox Erazo RN

## 2024-08-28 ENCOUNTER — TRANSFERRED RECORDS (OUTPATIENT)
Dept: MULTI SPECIALTY CLINIC | Facility: CLINIC | Age: 64
End: 2024-08-28

## 2024-08-28 LAB — RETINOPATHY: NORMAL

## 2024-09-04 ENCOUNTER — OFFICE VISIT (OUTPATIENT)
Dept: ORTHOPEDICS | Facility: CLINIC | Age: 64
End: 2024-09-04
Payer: COMMERCIAL

## 2024-09-04 ENCOUNTER — HOSPITAL ENCOUNTER (OUTPATIENT)
Dept: CARDIOLOGY | Facility: CLINIC | Age: 64
Discharge: HOME | End: 2024-09-04
Attending: INTERNAL MEDICINE | Admitting: INTERNAL MEDICINE
Payer: COMMERCIAL

## 2024-09-04 DIAGNOSIS — M17.0 PRIMARY OSTEOARTHRITIS OF BOTH KNEES: Primary | ICD-10-CM

## 2024-09-04 DIAGNOSIS — I25.10 CORONARY ARTERY DISEASE INVOLVING NATIVE CORONARY ARTERY OF NATIVE HEART WITHOUT ANGINA PECTORIS: ICD-10-CM

## 2024-09-04 LAB — LVEF ECHO: NORMAL

## 2024-09-04 PROCEDURE — 93306 TTE W/DOPPLER COMPLETE: CPT

## 2024-09-04 PROCEDURE — 93306 TTE W/DOPPLER COMPLETE: CPT | Mod: 26 | Performed by: INTERNAL MEDICINE

## 2024-09-04 PROCEDURE — 20611 DRAIN/INJ JOINT/BURSA W/US: CPT | Mod: 50 | Performed by: FAMILY MEDICINE

## 2024-09-04 RX ORDER — ROPIVACAINE HYDROCHLORIDE 5 MG/ML
4 INJECTION, SOLUTION EPIDURAL; INFILTRATION; PERINEURAL
Status: SHIPPED | OUTPATIENT
Start: 2024-09-04

## 2024-09-04 RX ORDER — METHYLPREDNISOLONE ACETATE 40 MG/ML
40 INJECTION, SUSPENSION INTRA-ARTICULAR; INTRALESIONAL; INTRAMUSCULAR; SOFT TISSUE
Status: SHIPPED | OUTPATIENT
Start: 2024-09-04

## 2024-09-04 RX ADMIN — ROPIVACAINE HYDROCHLORIDE 4 ML: 5 INJECTION, SOLUTION EPIDURAL; INFILTRATION; PERINEURAL at 11:20

## 2024-09-04 RX ADMIN — METHYLPREDNISOLONE ACETATE 40 MG: 40 INJECTION, SUSPENSION INTRA-ARTICULAR; INTRALESIONAL; INTRAMUSCULAR; SOFT TISSUE at 11:20

## 2024-09-04 NOTE — LETTER
9/4/2024      Rocio Price  [address] [facility] [address]      Dear Colleague,    Thank you for referring your patient, Rocio Price, to the [facility] [place]. Please see a copy of my visit note below.    ASSESSMENT & PLAN  Patient Instructions     1. Primary osteoarthritis of both knees      -Patient is following for chronic bilateral knee pain due to arthritis  -Patient tolerated bilateral knee intra-articular cortisone injections today without complications.  Patient was given postprocedure instructions  -Patient will start formal physical therapy and home exercise program  -Patient will follow-up when pain returns  -Call direct clinic number [[phone]] at any time with questions or concerns.    Albert Yeo MD [facility] Orthopedics and Sports Medicine  [facility]        -----    SUBJECTIVE:  Rocio Price is a 64 year old female who is seen for bilateral knees at the request of Indra Heck PA-C. Patient previously had corticosteroid injection (most recent: 02/23/24) that provided  4 month(s) of relief.          Large Joint Injection/Arthocentesis: bilateral knee    Date/Time: 9/4/2024 11:20 AM    Performed by: Yeo, Albert, MD  Authorized by: Yeo, Albert, MD    Indications:  Pain and osteoarthritis  Needle Size:  22 G  Guidance: ultrasound    Approach:  Anterolateral  Location:  Knee  Laterality:  Bilateral      Medications (Right):  40 mg methylPREDNISolone 40 MG/ML; 4 mL ROPivacaine 5 MG/ML  Aspirate amount (mL):  10  Aspirate:  Yellow  Medications (Left):  40 mg methylPREDNISolone 40 MG/ML; 4 mL ROPivacaine 5 MG/ML  Outcome:  Tolerated well, no immediate complications  Procedure discussed: discussed risks, benefits, and alternatives    Consent Given by:  Patient  Timeout: timeout called immediately prior to procedure    Prep: patient was prepped and draped in usual sterile fashion     Ultrasound was used to ensure safe and  accurate needle placement and injection. Ultrasound images of the procedure were permanently stored.        Albert Yeo MD, [facility]      Again, thank you for allowing me to participate in the care of your patient.        Sincerely,        Albert Yeo, MD

## 2024-09-04 NOTE — PATIENT INSTRUCTIONS
1. Primary osteoarthritis of both knees      -Patient is following for chronic bilateral knee pain due to arthritis  -Patient tolerated bilateral knee intra-articular cortisone injections today without complications.  Patient was given postprocedure instructions  -Patient will start formal physical therapy and home exercise program  -Patient will follow-up when pain returns  -Call direct clinic number [[phone]] at any time with questions or concerns.    Albert Yeo MD [facility]  [facility] [place]

## 2024-09-23 DIAGNOSIS — G62.9 PERIPHERAL POLYNEUROPATHY: ICD-10-CM

## 2024-09-23 DIAGNOSIS — K21.00 GASTROESOPHAGEAL REFLUX DISEASE WITH ESOPHAGITIS WITHOUT HEMORRHAGE: ICD-10-CM

## 2024-09-23 DIAGNOSIS — F33.0 MILD EPISODE OF RECURRENT MAJOR DEPRESSIVE DISORDER (H): ICD-10-CM

## 2024-09-23 DIAGNOSIS — I50.9 CONGESTIVE HEART FAILURE, UNSPECIFIED HF CHRONICITY, UNSPECIFIED HEART FAILURE TYPE (H): ICD-10-CM

## 2024-09-23 DIAGNOSIS — E78.5 HYPERLIPIDEMIA LDL GOAL <100: ICD-10-CM

## 2024-09-23 RX ORDER — GABAPENTIN 300 MG/1
300 CAPSULE ORAL 3 TIMES DAILY
Qty: 270 CAPSULE | Refills: 3 | OUTPATIENT
Start: 2024-09-23

## 2024-09-23 RX ORDER — ATORVASTATIN CALCIUM 40 MG/1
TABLET, FILM COATED ORAL
Qty: 90 TABLET | Refills: 3 | OUTPATIENT
Start: 2024-09-23

## 2024-09-23 RX ORDER — PANTOPRAZOLE SODIUM 40 MG/1
40 TABLET, DELAYED RELEASE ORAL DAILY
Qty: 90 TABLET | Refills: 3 | OUTPATIENT
Start: 2024-09-23

## 2024-09-23 RX ORDER — CITALOPRAM HYDROBROMIDE 20 MG/1
20 TABLET ORAL DAILY
Qty: 90 TABLET | Refills: 3 | OUTPATIENT
Start: 2024-09-23

## 2024-09-23 RX ORDER — BUPROPION HYDROCHLORIDE 150 MG/1
150 TABLET, EXTENDED RELEASE ORAL DAILY
Qty: 90 TABLET | Refills: 3 | OUTPATIENT
Start: 2024-09-23

## 2024-09-23 RX ORDER — FUROSEMIDE 20 MG
20 TABLET ORAL 2 TIMES DAILY
Qty: 180 TABLET | Refills: 3 | OUTPATIENT
Start: 2024-09-23

## 2024-09-23 RX ORDER — METOPROLOL SUCCINATE 50 MG/1
50 TABLET, EXTENDED RELEASE ORAL DAILY
Qty: 90 TABLET | Refills: 3 | OUTPATIENT
Start: 2024-09-23

## 2024-10-29 ENCOUNTER — VIRTUAL VISIT (OUTPATIENT)
Dept: PHARMACY | Facility: OTHER | Age: 64
End: 2024-10-29
Attending: PHYSICIAN ASSISTANT
Payer: COMMERCIAL

## 2024-10-29 DIAGNOSIS — M06.00 SERONEGATIVE EROSIVE RHEUMATOID ARTHRITIS (H): Primary | ICD-10-CM

## 2024-10-29 DIAGNOSIS — Z71.85 VACCINE COUNSELING: ICD-10-CM

## 2024-10-29 RX ORDER — NEEDLES, SAFETY 22GX1 1/2"
1 NEEDLE, DISPOSABLE MISCELLANEOUS WEEKLY
Qty: 12 EACH | Refills: 0 | Status: SHIPPED | OUTPATIENT
Start: 2024-10-29

## 2024-10-29 NOTE — Clinical Note
She continues to have pain, stiffness and tender, swollen joints in her hands. You previously mentioned you would be ok with switching to Enbrel or Orencia. Do you have a preference for either one? She also has changes in insurance that add financial barrier, but she may qualify for the  specialty aurelia. I can send order for whichever med you prefer and work on coverage with the liaison. If you prefer she stay on Humira until she sees you, let me know and I can work on financial assistance for that if needed.

## 2024-10-29 NOTE — PATIENT INSTRUCTIONS
"Recommendations from today's MTM visit:                                                       Call [phone] to schedule a visit with Jessica Dahl  Have labs ordered by Jessica Dahl checked as soon as possible  Luis Antonio will check about therapy options with Jessica, including Enbrel and Orencia (information about both medications attached to After Visit Summary)  Consider the following vaccines:   Annual flu    Follow-up: No follow-ups on file.    It was great speaking with you today.  I value your experience and would be very thankful for your time in providing feedback in our clinic survey. In the next few days, you may receive an email or text message from [facility] [employer] with a link to a survey related to your  clinical pharmacist.\"     To schedule another MTM appointment, please call the clinic directly or you may call the MTM scheduling line at [phone] or toll-free at [phone].     My Clinical Pharmacist's contact information:                                                      Please feel free to contact me with any questions or concerns you have.      Luis Antonio Dee, PharmD  Medication Therapy Management Pharmacist  [facility]  Phone: [phone]   "

## 2024-10-29 NOTE — PROGRESS NOTES
Medication Therapy Management (MTM) Encounter    ASSESSMENT:                            Medication Adherence/Access: Patient may qualify for [facility] aurelia through [facility]. Will work with pharmacy liaison once therapy plan is determined    Seronegative erosive rheumatoid arthritis: Patient would benefit from changing Humira to another medication like Enbrel or Orencia. She is due for visit with her rheumatologist, so will discuss with her provider if she wants to change therapy or see patient first for evaluation. She is due for lab monitoring approximately 11/13/24. Recommend continue methotrexate and folic acid at current dose. - Jessica Dahl approved Enbrel 50 mg once weekly    Vaccines: Per ACIP guidelines, patient is eligible for Covid-19 and Influenza     PLAN:                            Call [phone] to schedule a visit with Jessica Dahl  Have labs ordered by Jessica Dahl checked as soon as possible  Luis Antonio will check about therapy options with Jessica, including Enbrel and Orencia (information about both medications attached to After Visit Summary)  Consider the following vaccines:   Annual flu    Follow-up: Luis Antonio will reach out after confirming therapy plan with Jessica for new medication education/training    SUBJECTIVE/OBJECTIVE:                          Rocio Price is a 64 year old female seen for a follow-up visit from 7/16/2024. She was referred to me from Jessica Dahl PA-C.       Reason for visit: Rheumatoid arthritis follow up.    Allergies/ADRs: Reviewed in chart  Past Medical History: Reviewed in chart  Tobacco: She reports that she has never smoked. She has never been exposed to tobacco smoke. She has never used smokeless tobacco.  Alcohol: Less than 1 beverage / month  Caffeine: cup of coffee every morning    Medication Adherence/Access: Patient states she is now on Medicare and no longer getting Humira from the . She does not have any medication for her  next dose.    Seronegative erosive rheumatoid arthritis:   Humira 40 mg every 14 days in the stomach on Wednesdays (started October 2023, last dose 10/16/2024)  Methotrexate 22.5 mg subcutaneously once weekly on Wednesdays  Folic acid 2 mg once daily  Acetaminophen 1000 mg twice daily AM and late afternoon    Patient reports she continues to have pain in her hands. States there is swelling and stiffness lasts all day. Both hands are tender and warm. Dose increase of folic was helpful with hair loss.    Last lab monitoring completed: 8/13/2024    Vaccinations:  Up to date on PCV20, Shingrix, TDaP, and RSV.   Eligible for Influenza and COVID-19. Patient declines COVID.    Patient declined to discuss other conditions at this visit as she is also seeing a primary care MTM pharmacist (scheduled for next visit on 11/11/24). States that everything else is going well.     Today's Vitals: LMP  (LMP Unknown)   ----------------    I spent 13 minutes with this patient today. All changes were made via collaborative practice agreement with Jessica Dahl. A copy of the visit note was provided to the patient's provider(s).    A summary of these recommendations was sent via [employer].    Luis Antonio Dee, PharmD  Medication Therapy Management Pharmacist  [facility]  Phone: [phone]    Telemedicine Visit Details  The patient's medications can be safely assessed via a telemedicine encounter.  Type of service:  Telephone visit  Originating Location (pt. Location): Home    Distant Location (provider location):  On-site  Start Time: 2:31 PM  End Time: 2:44 PM     Medication Therapy Recommendations  Rheumatoid arthritis involving multiple sites with positive rheumatoid factor (H)   1 Current Medication: adalimumab (HUMIRA *CF*) 40 MG/0.4ML pen kit (Discontinued)   Current Medication Sig: Inject 0.4 mLs (40 mg) Subcutaneous every 14 days Hold for signs of infection, then seek medical attention.    Rationale: More effective medication available - Ineffective medication - Effectiveness   Recommendation: Change Medication - ORENCIA CLICKJECT SC   Status: Accepted with Changes per Provider   Identified Date: 7/16/2024 Completed Date: 10/29/2024   Note: Provider recommended Enbrel         Vaccine counseling   1 Rationale: Preventive therapy - Needs additional medication therapy - Indication   Recommendation: Provide Education   Status: Patient Agreed - Adherence/Education   Identified Date: 10/29/2024 Completed Date: 10/29/2024

## 2024-10-30 ENCOUNTER — TELEPHONE (OUTPATIENT)
Dept: RHEUMATOLOGY | Facility: CLINIC | Age: 64
End: 2024-10-30
Payer: COMMERCIAL

## 2024-10-30 NOTE — TELEPHONE ENCOUNTER
PA Initiation    Medication: ENBREL SURECLICK 50 MG/ML SC SOAJ  Insurance Company: [employer] Part D - Phone [phone] Fax [phone]  Pharmacy Filling the Rx:    Filling Pharmacy Phone:    Filling Pharmacy Fax:    Start Date: 10/30/2024

## 2024-11-01 NOTE — TELEPHONE ENCOUNTER
Prior Authorization Approval    Medication: ENBREL SURECLICK 50 MG/ML SC SOAJ  Authorization Effective Date: 10/30/2024  Authorization Expiration Date: 4/30/2025  Approved Dose/Quantity: 4 per 28 days  Reference #: Key: [id number]   Insurance Company: [employer] Part D - Phone [phone] Fax [phone]  Expected CoPay: $ 2,007.15  CoPay Card Available:      Financial Assistance Needed:         Cost was previously an issue in August 2024. She is above income for all assistance program and planned on starting in 2025 due to lowered out of pocket maximum and payment plan.

## 2024-11-08 ENCOUNTER — MYC REFILL (OUTPATIENT)
Dept: FAMILY MEDICINE | Facility: CLINIC | Age: 64
End: 2024-11-08
Payer: COMMERCIAL

## 2024-11-08 DIAGNOSIS — Z79.4 TYPE 2 DIABETES MELLITUS WITH HYPERGLYCEMIA, WITH LONG-TERM CURRENT USE OF INSULIN (H): ICD-10-CM

## 2024-11-08 DIAGNOSIS — I50.9 CONGESTIVE HEART FAILURE, UNSPECIFIED HF CHRONICITY, UNSPECIFIED HEART FAILURE TYPE (H): ICD-10-CM

## 2024-11-08 DIAGNOSIS — K21.00 GASTROESOPHAGEAL REFLUX DISEASE WITH ESOPHAGITIS WITHOUT HEMORRHAGE: ICD-10-CM

## 2024-11-08 DIAGNOSIS — E11.65 TYPE 2 DIABETES MELLITUS WITH HYPERGLYCEMIA, WITH LONG-TERM CURRENT USE OF INSULIN (H): ICD-10-CM

## 2024-11-08 RX ORDER — PANTOPRAZOLE SODIUM 40 MG/1
40 TABLET, DELAYED RELEASE ORAL DAILY
Qty: 90 TABLET | Refills: 0 | Status: SHIPPED | OUTPATIENT
Start: 2024-11-08

## 2024-11-08 RX ORDER — FUROSEMIDE 20 MG/1
20 TABLET ORAL 2 TIMES DAILY
Qty: 180 TABLET | Refills: 0 | Status: SHIPPED | OUTPATIENT
Start: 2024-11-08

## 2024-11-11 RX ORDER — DAPAGLIFLOZIN 5 MG/1
5 TABLET, FILM COATED ORAL DAILY
Qty: 90 TABLET | Refills: 0 | Status: SHIPPED | OUTPATIENT
Start: 2024-11-11

## 2024-11-18 ENCOUNTER — VIRTUAL VISIT (OUTPATIENT)
Dept: PHARMACY | Facility: CLINIC | Age: 64
End: 2024-11-18
Payer: COMMERCIAL

## 2024-11-18 DIAGNOSIS — Z79.4 TYPE 2 DIABETES MELLITUS WITH HYPERGLYCEMIA, WITH LONG-TERM CURRENT USE OF INSULIN (H): ICD-10-CM

## 2024-11-18 DIAGNOSIS — E11.65 TYPE 2 DIABETES MELLITUS WITH HYPERGLYCEMIA, WITH LONG-TERM CURRENT USE OF INSULIN (H): ICD-10-CM

## 2024-11-18 NOTE — PROGRESS NOTES
"Medication Therapy Management (MTM) Encounter    ASSESSMENT:                            Medication Adherence/Access: See below for considerations.    Diabetes  Patient is not meeting A1c goal of < 7%.  Patient would benefit from restarting CGM with Renetta and rechecking an A1c on 12/6.  Patient is also due for repeat lipid panel and BMP.  Patient will likely require a medication adjustment, but unable to assess without blood sugar readings.    PLAN:                            Restart CGM with Renetta, recheck labs (A1c, cholesterol levels, electrolytes and kidney function) on 12/6  Continue Ozempic 1 mg weekly and Farxiga 5 mg daily    Follow-up: with Mechelle Erazo PA-C in clinic on 12/6/2024 and with MTM via phone on:  Monday Dec 16, 2024   Appt at 8:30 AM (30 min)  Telephone      SUBJECTIVE/OBJECTIVE:                          Rocio Price is a 64 year old female seen for a follow-up visit.       Reason for visit: Diabetes follow up.    Allergies/ADRs: Reviewed in chart  Past Medical History: Reviewed in chart  Tobacco: She reports that she has never smoked. She has never been exposed to tobacco smoke. She has never used smokeless tobacco.  Alcohol: Less than 1 beverage / month  Caffeine: cup of coffee every morning  Medication Adherence/Access: no issues reported.    Diabetes   Lantus 30 units at morning - hasn't been using  Ozempic 1 mg weekly (increased 9/2024)  Farxiga 5 mg daily (restarted 3/2024)  Patient reports no medication side effects.  She hasn't been using Lantus, notes she prefers to not be on insulin.  She hasn't checked blood sugar \"in awhile\". Plans to start Renetta, has sensors at home.  Current diabetes symptoms: None  Diet: Appetite is decreased with Ozempic, typically fasts during the day and eats dinner in the evening.    Recent medication history  Stopped metformin ~6/2024 because it made her feel tired, had \"zero\" energy  Farxiga stopped 09/2023 in the past due to elevated creatinine, has been " restarted  Meal time insulin (Novolog) stopped when Ozempic was started     Eye exam in the last 12 months? No  Foot exam: due    Lab Results   Component Value Date    A1C 9.6 08/13/2024    A1C 8.2 03/05/2024    A1C 8.9 10/12/2023    A1C 9.9 06/15/2023    A1C 11.2 10/27/2022     Today's Vitals: LMP  (LMP Unknown)   ----------------    I spent 27 minutes with this patient today. All changes were made via collaborative practice agreement with Mechelle Erazo PA-C. A copy of the visit note was provided to the patient's provider(s). A summary of these recommendations was sent via [employer].    Deanne Jean-Baptiste (Hailie)D, MPH  Medication Therapy Management Pharmacist     Telemedicine Visit Details  The patient's medications can be safely assessed via a telemedicine encounter.  Type of service:  Telephone visit  Originating Location (pt. Location): Home    Distant Location (provider location):  On-site  Start Time:  3:30 PM  End Time:  3:57 PM     Medication Therapy Recommendations  No medication therapy recommendations to display

## 2024-11-18 NOTE — Clinical Note
Hi Mechelle,  Looks like Rocio is establishing care with you on 12/6. Just an FYI she's not been using her Lantus, but has continued with Ozempic 1 mg and Farxiga 5 mg (had elevated Scr with 10 mg in the past). She is not checking blood sugar, but plans to restart CGM with Renetta. I suspect blood sugar is pretty high, but I didn't make a change since unable to assess readings. I will check on her Renetta data in 2 weeks (if she has restarted Renetta) and will probably increase Ozempic or restart Lantus. I have a follow up scheduled with her on 12/16.  Thanks much! Jw Melissa) Ammy, PharmD, MPH Medication Therapy Management Pharmacist

## 2024-11-19 DIAGNOSIS — E78.5 HYPERLIPIDEMIA LDL GOAL <100: ICD-10-CM

## 2024-11-19 RX ORDER — ATORVASTATIN CALCIUM 40 MG/1
TABLET, FILM COATED ORAL
Qty: 90 TABLET | Refills: 0 | Status: SHIPPED | OUTPATIENT
Start: 2024-11-19

## 2024-11-21 NOTE — PATIENT INSTRUCTIONS
"Recommendations from today's MTM visit:                                                       Restart CGM with Renetta, recheck labs (A1c, cholesterol levels, electrolytes and kidney function) on 12/6  Continue Ozempic 1 mg weekly and Farxiga 5 mg daily    Follow-up: with Mechelle Erazo PA-C in clinic on 12/6/2024 and with MTM via phone on:  Monday Dec 16, 2024   Appt at 8:30 AM (30 min)  Telephone      It was great speaking with you today.  I value your experience and would be very thankful for your time in providing feedback in our clinic survey. In the next few days, you may receive an email or text message from [employer] with a link to a survey related to your  clinical pharmacist.\"     To schedule another MTM appointment, please call the clinic directly or you may call the MTM scheduling line at [phone] or toll-free at [phone].     My Clinical Pharmacist's contact information:                                                      Please feel free to contact me with any questions or concerns you have.      Jw Gimenez (Hailie), PharmD, MPH  Medication Therapy Management Pharmacist    "

## 2024-11-25 DIAGNOSIS — J45.40 MODERATE PERSISTENT ASTHMA WITHOUT COMPLICATION: ICD-10-CM

## 2024-11-25 RX ORDER — MONTELUKAST SODIUM 10 MG/1
1 TABLET ORAL AT BEDTIME
Qty: 90 TABLET | Refills: 0 | Status: SHIPPED | OUTPATIENT
Start: 2024-11-25

## 2024-11-30 DIAGNOSIS — I50.9 CONGESTIVE HEART FAILURE, UNSPECIFIED HF CHRONICITY, UNSPECIFIED HEART FAILURE TYPE (H): ICD-10-CM

## 2024-11-30 DIAGNOSIS — F33.0 MILD EPISODE OF RECURRENT MAJOR DEPRESSIVE DISORDER (H): ICD-10-CM

## 2024-12-02 DIAGNOSIS — F33.0 MILD EPISODE OF RECURRENT MAJOR DEPRESSIVE DISORDER (H): ICD-10-CM

## 2024-12-02 DIAGNOSIS — I50.9 CONGESTIVE HEART FAILURE, UNSPECIFIED HF CHRONICITY, UNSPECIFIED HEART FAILURE TYPE (H): ICD-10-CM

## 2024-12-02 DIAGNOSIS — K21.00 GASTROESOPHAGEAL REFLUX DISEASE WITH ESOPHAGITIS WITHOUT HEMORRHAGE: ICD-10-CM

## 2024-12-02 DIAGNOSIS — G62.9 PERIPHERAL POLYNEUROPATHY: ICD-10-CM

## 2024-12-02 DIAGNOSIS — E78.5 HYPERLIPIDEMIA LDL GOAL <100: ICD-10-CM

## 2024-12-02 RX ORDER — BUPROPION HYDROCHLORIDE 150 MG/1
150 TABLET, EXTENDED RELEASE ORAL DAILY
Qty: 90 TABLET | Refills: 0 | Status: SHIPPED | OUTPATIENT
Start: 2024-12-02

## 2024-12-02 RX ORDER — CITALOPRAM HYDROBROMIDE 20 MG/1
20 TABLET ORAL DAILY
Qty: 90 TABLET | Refills: 0 | Status: SHIPPED | OUTPATIENT
Start: 2024-12-02

## 2024-12-02 RX ORDER — METOPROLOL SUCCINATE 50 MG/1
50 TABLET, EXTENDED RELEASE ORAL DAILY
Qty: 90 TABLET | Refills: 0 | Status: SHIPPED | OUTPATIENT
Start: 2024-12-02

## 2024-12-02 RX ORDER — FUROSEMIDE 20 MG/1
20 TABLET ORAL 2 TIMES DAILY
Qty: 180 TABLET | Refills: 3 | OUTPATIENT
Start: 2024-12-02

## 2024-12-02 RX ORDER — ATORVASTATIN CALCIUM 40 MG/1
TABLET, FILM COATED ORAL
Qty: 90 TABLET | Refills: 3 | OUTPATIENT
Start: 2024-12-02

## 2024-12-02 RX ORDER — GABAPENTIN 300 MG/1
300 CAPSULE ORAL 3 TIMES DAILY
Qty: 270 CAPSULE | Refills: 0 | Status: SHIPPED | OUTPATIENT
Start: 2024-12-02

## 2024-12-02 RX ORDER — PANTOPRAZOLE SODIUM 40 MG/1
40 TABLET, DELAYED RELEASE ORAL DAILY
Qty: 90 TABLET | Refills: 3 | OUTPATIENT
Start: 2024-12-02

## 2024-12-10 DIAGNOSIS — M06.00 SERONEGATIVE EROSIVE RHEUMATOID ARTHRITIS (H): ICD-10-CM

## 2024-12-10 NOTE — TELEPHONE ENCOUNTER
Pending Prescriptions:                       Disp   Refills    Methotrexate Sodium 250 MG/10ML SOLN      4 mL   0            Sig: Inject 0.9 mLs subcutaneously once a week.    folic acid (FOLVITE) 1 MG tablet          180 ta*3            Sig: Take 2 tablets (2 mg) by mouth daily.

## 2024-12-12 RX ORDER — FOLIC ACID 1 MG/1
2 TABLET ORAL DAILY
Qty: 180 TABLET | Refills: 3 | Status: SHIPPED | OUTPATIENT
Start: 2024-12-12

## 2024-12-12 NOTE — TELEPHONE ENCOUNTER
1 month israel refill provided. Patient is over due for labs.     Jessica Dahl [facility] Rheumatology

## 2025-01-15 ENCOUNTER — TELEPHONE (OUTPATIENT)
Dept: PHARMACY | Facility: OTHER | Age: 65
End: 2025-01-15
Payer: COMMERCIAL

## 2025-01-15 NOTE — TELEPHONE ENCOUNTER
Pt is due for follow up with Luis Antonio HARRIS.     Call placed to pt to initiate scheduling on 1/15/25    Outcome: Pt is scheduled for a phone visit on 1/20 at 9:30am        *Pt states that her arthritis is really really bad; pretty painful. Pt states that her hands have been hurting her quite a bit.*      Routing to Luis Antonio as an FYI

## 2025-01-16 ENCOUNTER — MYC REFILL (OUTPATIENT)
Dept: FAMILY MEDICINE | Facility: CLINIC | Age: 65
End: 2025-01-16
Payer: COMMERCIAL

## 2025-01-16 DIAGNOSIS — M06.00 SERONEGATIVE EROSIVE RHEUMATOID ARTHRITIS (H): ICD-10-CM

## 2025-01-20 ENCOUNTER — VIRTUAL VISIT (OUTPATIENT)
Dept: PHARMACY | Facility: OTHER | Age: 65
End: 2025-01-20
Attending: PHYSICIAN ASSISTANT
Payer: COMMERCIAL

## 2025-01-20 DIAGNOSIS — M06.00 SERONEGATIVE EROSIVE RHEUMATOID ARTHRITIS (H): Primary | ICD-10-CM

## 2025-01-20 DIAGNOSIS — Z71.85 VACCINE COUNSELING: ICD-10-CM

## 2025-01-20 NOTE — PROGRESS NOTES
Medication Therapy Management (MTM) Encounter    ASSESSMENT:                            Medication Adherence/Access: Recommend patient call her insurance and sign up for Medicare Prescription Payment Plan (M3P), if needed, as this will help with cost concerns for her medications. Provided education on $2000 out of pocket max for most Medicare plans for 2025.    Seronegative erosive rheumatoid arthritis: Patient would benefit from starting Enbrel 50 mg once weekly and continuing methotrexate and folic acid at current doses. She is due for office visit with her rheumatologist and due for lab monitoring, particularly AST/ALT for methotrexate toxicity monitoring.    Vaccines: Per ACIP guidelines, patient is eligible for Covid-19. Patient declines vaccination.    PLAN:                            Start Enbrel 50 mg subcutaneous injection every 7 days  Administration Video:  [email or web address]   Call [phone] to schedule visit with Jessica Dahl  Call [facility] at [phone] to fill your Enbrel    Follow-up: Return in 13 weeks (on 4/21/2025) for Follow up, with me, using a phone visit.    SUBJECTIVE/OBJECTIVE:                          Rocio Price is a 64 year old female seen for a follow-up visit from 10/29/2024. She was referred to me from Jessica Dahl PA-C. Patient also works with primary care MTM pharmacist and scheduled with them on 1/27/2025.    Reason for visit: Rheumatoid arthritis follow up.    Allergies/ADRs: Reviewed in chart  Past Medical History: Reviewed in chart  Tobacco: She reports that she has never smoked. She has never been exposed to tobacco smoke. She has never used smokeless tobacco.  Alcohol: Less than 1 beverage / month    Medication Adherence/Access: Patient was unable to afford Enbrel for remainder of 2024 so she never started it. Plan was to sign up for Medicare Prescription Payment Plan for 2025, patient has not signed up for  that yet. She did not qualify for FPAP or  PAP programs when we looked into those late 2024.    Seronegative erosive rheumatoid arthritis:   Enbrel 50 mg subcutaneously every 7 days - has not started yet  Methotrexate 22.5 mg subcutaneously once weekly on Wednesdays  Folic acid 2 mg once daily  Acetaminophen 1000 mg twice daily AM and late afternoon    Patient reports she is having pain in her hands and shoulders that she rates 6-9/10. Reports there is visible swelling in her hands, fingers, and right elbow. Reports morning stiffness in her hands that lasts 1-2 hours. Reports no side effects to her current therapy. Last dose of Humira was approximately 10/16/2024.    Previous therapies: Humira    Vaccinations:  Immunization History   Covid-19 vaccine (7026-0380 version)  Patient declines   Influenza (annual) Up-to-date   Pneumococcal  Pneumovax-23: 5/13/2011   Prevnar-20: 6/15/2023 Up-to-date   Tetanus/Tdap  Up-to-date   Shingrix Complete   RSV (only for >= 60 years old)  Complete   All patients on biologics should avoid live vaccines (varicella/VZV, intranasal influenza, MMR, or yellow fever vaccine (if traveling))       Today's Vitals: LMP  (LMP Unknown)   ----------------    I spent 13 minutes with this patient today. All changes were made via collaborative practice agreement with Jessica Dahl.     A summary of these recommendations was sent via [employer].    Luis Antonio Dee, PharmD  Medication Therapy Management Pharmacist  [facility]  Phone: [phone]    Telemedicine Visit Details  The patient's medications can be safely assessed via a telemedicine encounter.  Type of service:  Telephone visit  Originating Location (pt. Location): Home    Distant Location (provider location):  On-site  Start Time: 9:33 AM  End Time:  9:46 AM     Medication Therapy Recommendations  Seronegative erosive rheumatoid arthritis (H)   1 Current Medication: etanercept (ENBREL SURECLICK)  50 MG/ML autoinjector   Current Medication Sig: Inject 50 mg subcutaneously every 7 days.   Rationale: Cannot afford medication product - Cost - Adherence   Recommendation: Referral to Service    Status: Patient Agreed - Adherence/Education   Identified Date: 1/20/2025 Completed Date: 1/20/2025   Note: Medicare Prescription Payment Plan         Vaccine counseling   1 Rationale: Preventive therapy - Needs additional medication therapy - Indication   Recommendation: Order Vaccine   Status: Declined per Patient   Identified Date: 1/20/2025 Completed Date: 1/20/2025

## 2025-01-28 NOTE — TELEPHONE ENCOUNTER
Medication Renewal Request  Message [id number]  From  Monse Simon RN To  Rocio Price Sent and Delivered  1/17/2025 10:37 AM   Last Read in DocuTAP  1/20/2025 12:33 PM by Rocio Price     Patient read message. Has not made a lab or follow up appointment.    Rx denied.    Sheridan Grace RN on 1/28/2025 at 3:47 PM

## 2025-01-29 ENCOUNTER — VIRTUAL VISIT (OUTPATIENT)
Dept: RHEUMATOLOGY | Facility: CLINIC | Age: 65
End: 2025-01-29
Payer: COMMERCIAL

## 2025-01-29 VITALS — BODY MASS INDEX: 35.87 KG/M2 | WEIGHT: 190 LBS | HEIGHT: 61 IN

## 2025-01-29 DIAGNOSIS — M06.00 SERONEGATIVE EROSIVE RHEUMATOID ARTHRITIS (H): Primary | ICD-10-CM

## 2025-01-29 DIAGNOSIS — Z79.899 HIGH RISK MEDICATION USE: ICD-10-CM

## 2025-01-29 PROCEDURE — 98006 SYNCH AUDIO-VIDEO EST MOD 30: CPT | Performed by: PHYSICIAN ASSISTANT

## 2025-01-29 ASSESSMENT — PAIN SCALES - GENERAL: PAINLEVEL_OUTOF10: MODERATE PAIN (5)

## 2025-01-29 NOTE — PATIENT INSTRUCTIONS
SHANI Herrera Visit Instructions:     Thank you for coming to [facility] for your care. It is my goal to partner with you to help you reach your optimal state of health.       Plan:     Schedule follow-up with Jessica Dahl PA-C in 4 months.   Labs:  CBC, Creatinine, Albumin, AST, ALT, CRP and Sed Rate every 2-3 months, keep as scheduled   Medication recommendations:   Continue Methotrexate 22.5mg (0.9mL SubQ) weekly.   WEEKLY. While on Methotrexate:  -check labs (ALT/AST, albumin, CBC with platelets and creatinine) every 3 months  -Limit alcohol to 2 drinks weekly  -Take Folic Acid 2mg daily   -Tylenol 500-1000mg can be used as needed up to three times daily for nausea/headache associated with dosing  Continue Enbrel 50mg/ml SubQ weekly          Jessica Dahl PA-C  [facility]  [facility]    Contact information: [facility]  Clinic Number:  [phone]  Please call or send a [employer] message with any questions about your care

## 2025-01-29 NOTE — PROGRESS NOTES
Rocio Price is a 65 year old who is being evaluated via a billable video visit.      Will patient be in [place] for the visit?  Yes  Patient Location: home  How would patient like to enter the video visit? [employer]  How would patient like to obtain your AVS? MyChart    Virtual Visit Details  Visit start time: 4:17pm  Visit end time: 4:20pm     Type of service:  Video Visit     Video Format Used: Amwell  Provider Location:  On-site      Rheumatology Clinic Visit  [facility]  VINCE Burgess     Rocio Price MRN# [id number]   YOB: 1960 Age: 65 year old   Date of Visit: 1/29/2025  Primary care provider: Behzad Correa          Assessment and Plan:     1.  Seronegative erosive rheumatoid arthritis  2.  High-risk medication use    Patient presents today to for follow up for her seronegative erosive rheumatoid arthritis.  She just started the Enbrel last week.  So far tolerating it well.  She has noticed increased symptoms with the colder weather.  At this time she would like to continue with her current regimen which is appropriate since she just started the Enbrel.  She is scheduled for labs on Friday and she will keep that appointment.  She will continue otherwise her current regimen.  Will continue to monitor labs to monitor for any medication toxicity.  She will follow-up with me in 4 months, sooner if needed.      Plan:     Schedule follow-up with Jessica Dahl PA-C in 4 months.   Labs:  CBC, Creatinine, Albumin, AST, ALT, CRP and Sed Rate every 2-3 months, keep as scheduled   Medication recommendations:   Continue Methotrexate 22.5mg (0.9mL SubQ) weekly.   WEEKLY. While on Methotrexate:  -check labs (ALT/AST, albumin, CBC with platelets and creatinine) every 3 months  -Limit alcohol to 2 drinks weekly  -Take Folic Acid 2mg daily   -Tylenol 500-1000mg can be used as needed up to three times daily for nausea/headache associated with dosing  Continue Enbrel 50mg/ml SubQ  weekly    VINCE Burgess  Rheumatology         History of Present Illness:   Rocio Price presents for evaluation of rheumatoid arthritis.    Rheumatological history:  Provider(s): Dr. Wallace  Pertinent lab history: HLA B27 positive, elevated Sed Rate and CRP, negative MYRON,  negative CCP and RF  MRI showed erosions and synovitis  Previous medications tried: Prednisone,  Methotrexate, Humira (not effective)  Current medications: Enbrel Methotrexate 0.9mL weekly    Interval history January 29, 2025:  She states that her joints have been a little bit sore. She has had some increased knee, hand and shoulder pain. She would like to give her treatment more time.She just started Enbrel last week. She is scheduled for labs on Friday.     Interval history August 12, 2024:  She states that her joints are a little better, she still has swelling and her joints are still achy. She notices a burning in her joints. She states that her left hand has gotten worse. Her right hand may be a little better, but still having swelling. She states that her knees have been more painful as well. She is looking to get seen for that. Her feet have been bothering her as well and she finds it difficult to walk at times.     Interval history February 12, 2024:   She states that overall things have been a little bit better. Most of her arthritis has been in her right hand. She states that her left hand is a little worse, but is better with restarting the Methotrexate. She states that she has been noticing increased pain in her knees. At times she does have swelling as well. She has been recommended in the past to get xrays and was told that she has arthritis. She continues to have swelling in her fingers as well.    Interval history November 13, 2023:  She states that she continues to have joint pain and swelling. She states that she is hurting more around the left ankle on the medial aspect and the lateral aspect on the right.  The left  hand has not been as bad, but the right hand throbs and hurts.     HPI from consult of August 23, 2023:  She was diagnosed in 2021. She was put on Methotrexate and has been off of that for about 6 months. She has been having increased pain and swelling. She states that her left hand has been getting worse. She did not feel the methotrexate was working for her. She was given Prednisone, which was slightly helpful. She states that [place] was discussing putting her on a different medication, but she cannot remember which medication it was. She gets pain from her right shoulder down the right arm. She is unable to sleep on her right side. She finds that she will get numbness down the arm as well. The right elbow will get swollen, and the swelling and tenderness can go down the arm as well. No shortness of breath.     No history of infections. No history of MI's or blood clots. She does have high blood pressure and diabetes. She has congestive heart failure. She has a history of diverticulitis.                Review of Systems:     Constitutional: negative  Skin: negative  Eyes: negative  Ears/Nose/Throat: negative  Respiratory: No shortness of breath, dyspnea on exertion, cough, or hemoptysis  Cardiovascular: negative  Gastrointestinal: negative  Genitourinary: negative  Musculoskeletal: as above  Neurologic: negative  Psychiatric: negative  Hematologic/Lymphatic/Immunologic: negative  Endocrine: negative         Active Problem List:     Patient Active Problem List    Diagnosis Date Noted    Acute pain of both shoulders 10/23/2023     Priority: Medium    Atherosclerotic heart disease of native coronary artery without angina pectoris 10/27/2022     Priority: Medium     Formatting of this note might be different from the original.  moderate LAD stenosis by CT angiography      History of malignant neoplasm of breast 10/27/2022     Priority: Medium     Formatting of this note might be different from the original.  2013       History of squamous cell carcinoma of skin 10/27/2022     Priority: Medium    Rheumatoid arthritis involving multiple sites with positive rheumatoid factor (H) 10/27/2022     Priority: Medium    Moderate persistent asthma 02/04/2022     Priority: Medium     Last Assessment & Plan:   Formatting of this note might be different from the original.  Patient seems to be doing fairly well on her current regimen of Symbicort and albuterol.  Will continue to monitor.  Consider repeat PFTs further out from acute illness.      Depression 01/13/2022     Priority: Medium    Gastroesophageal reflux disease 01/13/2022     Priority: Medium    Hyperlipidemia 01/13/2022     Priority: Medium    Migraine headache 01/12/2022     Priority: Medium     Formatting of this note might be different from the original.  Associated with photophobia. Treated with over-the-counter medications, no prescriptions.      History of severe acute respiratory syndrome coronavirus 2 (SARS-CoV-2) disease 11/19/2021     Priority: Medium    Neuropathy, peripheral 02/04/2020     Priority: Medium    Congestive heart failure (H) 12/19/2018     Priority: Medium     Formatting of this note might be different from the original.  preserved ejection fraction, EF 60% ECHO 12/14/18    Last Assessment & Plan:   Formatting of this note might be different from the original.  HFpEF, EF 60% on Echo 12/14/2018  Continue aspirin 81 mg  Continue metoprolol succinate 50mg  Continue furosemide 40 mg daily      Type 2 diabetes mellitus with hyperglycemia, with long-term current use of insulin (H) 08/28/2017     Priority: Medium     Formatting of this note is different from the original.  Current Meds: Aspart insulin 6-10 units with meals as needed. Insulin glargine 50 units at bedtime. Metformin 1000 mg BID.     [place] D 4/5 (A1c)  Blood pressure: /64 (BP Location: Right arm, Patient Position: Sitting, Cuff Size: Large)  prior: 100/65  Statin: Atorvastatin 40mg   Aspirin:  81 mg daily  Tobacco: non-smoker  A1c:   Lab Results   Component Value Date    HGBA1C 10.5 (H) 01/11/2022    HGBA1C 10.8 (H) 09/08/2020    HGBA1C 12.6 (H) 01/10/2020     Renal Health: Stage 2 (eGFR 60-89)  Lab Results   Component Value Date    EGFRNONBLKAA 75 01/13/2022     Lab Results   Component Value Date    UMCROALBCONC <5.0 08/25/2017    CREATININEUR 108 01/10/2020    ALBCREARATIO <5 01/10/2020         Last Assessment & Plan:   Formatting of this note might be different from the original.  She has made great improvement since last check a few weeks ago both in diet and activity levels. This is greatly reflected in her blood sugar readings. I don't think we need to add additional medications at this time for diabetic control. Will recheck A1c in 2 months.      Morbid obesity (H) 04/14/2017     Priority: Medium    Female cystocele 06/03/2016     Priority: Medium    Obstructive sleep apnea syndrome in adult 11/11/2014     Priority: Medium     Last Assessment & Plan:   Formatting of this note might be different from the original.  Given evidence of significant CO2 retention despite improving respiratory symptoms and utilization of CPAP during hospitalization, will refer back to sleep medicine for consideration of adjustment of CPAP vs BiPAP.      Status post bilateral mastectomy 03/11/2014     Priority: Medium    Seasonal allergies 09/09/2013     Priority: Medium    Restless leg syndrome 11/17/2010     Priority: Medium    Sensorineural hearing loss (SNHL) of both ears 11/17/2010     Priority: Medium    Lichen sclerosus 09/14/2007     Priority: Medium            Past Medical History:   No past medical history on file.  Past Surgical History:   Procedure Laterality Date    HYSTERECTOMY, PAP NO LONGER INDICATED      HYSTERECTOMY, PAP NO LONGER INDICATED      MASTECTOMY, BILATERAL              Social History:     Social History     Socioeconomic History    Marital status:      Spouse name: Not on file    Number  of children: Not on file    Years of education: Not on file    Highest education level: Not on file   Occupational History    Not on file   Tobacco Use    Smoking status: Never     Passive exposure: Never    Smokeless tobacco: Never   Substance and Sexual Activity    Alcohol use: Not Currently    Drug use: Never    Sexual activity: Not on file   Other Topics Concern    Not on file   Social History Narrative    Not on file     Social Drivers of Health     Financial Resource Strain: Low Risk  (12/4/2024)    Financial Resource Strain     Within the past 12 months, have you or your family members you live with been unable to get utilities (heat, electricity) when it was really needed?: No   Food Insecurity: Low Risk  (12/4/2024)    Food Insecurity     Within the past 12 months, did you worry that your food would run out before you got money to buy more?: No     Within the past 12 months, did the food you bought just not last and you didn t have money to get more?: No   Transportation Needs: Low Risk  (12/4/2024)    Transportation Needs     Within the past 12 months, has lack of transportation kept you from medical appointments, getting your medicines, non-medical meetings or appointments, work, or from getting things that you need?: No   Physical Activity: Sufficiently Active (12/4/2024)    Exercise Vital Sign     Days of Exercise per Week: 7 days     Minutes of Exercise per Session: 30 min   Recent Concern: Physical Activity - Insufficiently Active (10/28/2024)    Exercise Vital Sign     Days of Exercise per Week: 7 days     Minutes of Exercise per Session: 10 min   Stress: No Stress Concern Present (12/4/2024)    [place] [employer] - Occupational Stress Questionnaire     Feeling of Stress : Only a little   Social Connections: Unknown (12/4/2024)    Social Connection and Isolation Panel [NHANES]     Frequency of Communication with Friends and Family: Not on file     Frequency of Social Gatherings  with Friends and Family: Once a week     Attends Nondenominational Services: Not on file     Active Member of Clubs or Organizations: Not on file     Attends Club or Organization Meetings: Not on file     Marital Status: Not on file   Interpersonal Safety: Low Risk  (12/6/2024)    Interpersonal Safety     Do you feel physically and emotionally safe where you currently live?: Yes     Within the past 12 months, have you been hit, slapped, kicked or otherwise physically hurt by someone?: No     Within the past 12 months, have you been humiliated or emotionally abused in other ways by your partner or ex-partner?: No   Housing Stability: Low Risk  (12/4/2024)    Housing Stability     Do you have housing? : Yes     Are you worried about losing your housing?: No          Family History:   No family history on file.         Allergies:     Allergies   Allergen Reactions    Seasonal Allergies Other (See Comments)     Itchy watery eyes            Medications:     Current Outpatient Medications   Medication Sig Dispense Refill    acetaminophen (TYLENOL) 500 MG tablet Take 500-1,000 mg by mouth every 6 hours as needed for mild pain      aspirin (ASA) 81 MG EC tablet Take 1 tablet (81 mg) by mouth daily 90 tablet 3    atorvastatin (LIPITOR) 40 MG tablet TAKE 1 TABLET BY MOUTH DAILY 90 tablet 3    budesonide-formoterol (SYMBICORT) 80-4.5 MCG/ACT Inhaler Inhale 1-2 puffs as needed. May use up to 12 puffs per day. 20.4 g 3    buPROPion (WELLBUTRIN SR) 150 MG 12 hr tablet Take 1 tablet (150 mg) by mouth daily. 90 tablet 1    citalopram (CELEXA) 20 MG tablet Take 1 tablet (20 mg) by mouth daily. 90 tablet 1    clobetasol (TEMOVATE) 0.05 % external ointment Apply topically 2 times daily as needed      Continuous Blood Gluc Sensor (FREESTYLE REYNOLD 3 SENSOR) MISC 1 each every 14 days 6 each 3    dapagliflozin (FARXIGA) 10 MG TABS tablet Take 1 tablet (10 mg) by mouth daily. 90 tablet 1    dicyclomine (BENTYL) 10 MG capsule TAKE 1 CAPSULE BY  "MOUTH FOUR TIMES A DAY AS NEEDED FOR ABDOMINAL PAIN OR CRAMPS      etanercept (ENBREL SURECLICK) 50 MG/ML autoinjector Inject 50 mg subcutaneously every 7 days. 4 mL 5    folic acid (FOLVITE) 1 MG tablet Take 2 tablets (2 mg) by mouth daily. 180 tablet 3    furosemide (LASIX) 20 MG tablet Take 1 tablet (20 mg) by mouth 2 times daily. 180 tablet 0    gabapentin (NEURONTIN) 300 MG capsule Take 1 capsule (300 mg) by mouth 3 times daily. 270 capsule 3    glucose-vitamin C 4-6 GM-MG CHEW Take 4 g by mouth      insulin syringe-needle U-100 (30G X 1/2\" 0.3 ML) 30G X 1/2\" 0.3 ML miscellaneous Use 4 syringes daily or as directed. For insulin. 360 each 11    ipratropium - albuterol 0.5 mg/2.5 mg/3 mL (DUONEB) 0.5-2.5 (3) MG/3ML neb solution Inhale 1 vial (3 mLs) into the lungs every 6 hours as needed for shortness of breath, wheezing or cough 90 mL 3    loratadine-pseudoePHEDrine (CLARITIN-D 12-HOUR) 5-120 MG 12 hr tablet Take 1 tablet by mouth daily      melatonin 5 MG tablet Take 5 mg by mouth At Bedtime      Methotrexate Sodium 250 MG/10ML SOLN Inject 0.9 mLs subcutaneously once a week. 4 mL 0    metoprolol succinate ER (TOPROL XL) 50 MG 24 hr tablet Take 1 tablet (50 mg) by mouth daily. 90 tablet 1    montelukast (SINGULAIR) 10 MG tablet Take 1 tablet (10 mg) by mouth at bedtime. 90 tablet 3    pantoprazole (PROTONIX) 40 MG EC tablet Take 1 tablet (40 mg) by mouth daily. 90 tablet 3    pramipexole (MIRAPEX) 0.125 MG tablet TAKE TWO TABLETS BY MOUTH EVERY DAY IN THE AFTERNOON & TAKE TWO TABLETS BY MOUTH EVERY EVENING 360 tablet 3    Semaglutide, 2 MG/DOSE, (OZEMPIC) 8 MG/3ML pen Inject 2 mg subcutaneously every 7 days. 9 mL 3    sodium chloride (NEBUSAL) 3 % neb solution Inhale 4 mLs into the lungs      syringe/needle, sisp, (B-D SYRINGE/NEEDLE) 25G X 5/8\" 1 ML MISC 1 each once a week. To inject methotrexate 12 each 0            Physical Exam:   Height 1.549 m (5' 1\"), weight 86.2 kg (190 lb).  Wt Readings from Last 6 " Encounters:   24 95.3 kg (210 lb)   24 115.8 kg (255 lb 6.4 oz)   24 117.9 kg (260 lb)   24 117.9 kg (260 lb)   11/10/23 117.9 kg (260 lb)   10/12/23 115.7 kg (255 lb)     Constitutional: well-developed, appearing stated age; cooperative  Eyes: nl conjunctiva, sclera  ENT: nl external ears, nose, hearing, lips,   Neck: no mass or thyroid enlargement  Resp: No shortness of breath with normal conversation  Skin: no nail pitting, alopecia, rash, nodules or lesions.   Psych: nl judgement, orientation, memory, affect.           Data:   Imagin2022 MR Hand Right without and with IV contrast  IMPRESSION:  1. Although there is a background of mild scattered degenerative arthritis in the right hand and  wrist, there are erosions in the 2nd and 3rd metacarpal heads and diffuse enhancing synovitis including the MCP joints, out of proportion to degenerative changes. Findings suggest a superimposed inflammatory process.  2. Mild tenosynovial enhancement involving the flexor and extensor tendons of the hand and wrist, which may also be inflammatory.  3. Nonspecific enlargement and T2 hyperintensity of the median nerve with enhancement; this can be  seen in the setting of carpal tunnel syndrome.    2022   DX ANKLE BILATERAL 3+ VIEWS  IMPRESSION:  Calcaneal spurs. Slight degenerative arthritis both ankles. No erosions. Postoperative  changes bilateral 5th metatarsals.     DX Hand Bilateral 2 Views  IMPRESSION:  Mild scattered degenerative arthritis of both hands. No erosions.     2022 CT Chest without IV contrast  IMPRESSION:  Interval improvement in the previously new bilateral upper lung predominant groundglass opacities with a couple of areas mild ill-defined residual groundglass, likely due to a resolving infectious/inflammatory process. Interval resolution of the previously new solid nodular consolidative opacities in the right lower lobe with improvement in right lower lobe  endobronchial  plugging, also compatible with an infectious/inflammatory etiology. Tiny indeterminate 4 mm posterior right upper lobe ground glass nodule which is unchanged from 02/12/2022, but new since 03/19/2019    Cardiac:  07/01/2022 TTE   Final Impressions  1. Mildly enlarged left ventricular chamber size, no regional wall motion abnormalities, calculated 2-D four chamber monoplane volumetric ejection fraction 60%.  2. Normal right ventricular chamber size, normal systolic function, estimated right ventricular systolic pressure 48 mmHg (right atrial pressure of 10 mmHg).  3. Mildly thickened aortic valve with trivial AR  4. Tiny posterior pericardial effusion.    Noted RVSP of 48.    12/14/2018 Stress Test Echo  Positive for septal and apical myocardial ischemia    12/10/2018: CT Cardiac Angiogram triple rule out with IV contrast  IMPRESSION:   1. Mild pulmonary edema, and additional right perihilar patchy opacities. These  could represent infectious/inflammatory etiology or possibly asymmetric edema.  2. 1.3 cm semisolid nodule in the right upper lung. This is likely secondary to  #1 above, recommend follow-up in 3-6 months to confirm resolution.  3. Moderate stenosis of the proximal LAD. CAD-RADS category 3. Normal systolic  LV function with no regional wall motion abnormalities.  4. Negative for acute pulmonary embolism.    04/05/2022 ECG: QTc 441, p-mitrale     11/18/2022 bone density scan  IMPRESSION: NORMAL. Bone mineral density measurements are within normal limits using T score.     Laboratory:  10/27/2022  Creatinine 0.70, GFR greater than 90  Hemoglobin A1c 11.2  TSH 1.91  White blood cell count 12.0, hemoglobin 13.1, platelet count 334    6/15/2023  Creatinine 0.74, GFR 90  Hemoglobin A1c 9.9    8/23/2023  Creatinine 1.08, GFR 57  ALT 26, AST 26  CRP 8.07  White blood cell count 10.5, hemoglobin 13.6, platelet count 343  TB negative  Hepatitis B and hepatitis C nonreactive    10/12/2023  Creatinine  0.80    2/15/2024  Creatinine 0.85, GFR 76  Albumin 4.3  ALT 33, AST 34  CRP 3.77  White blood cell count 8.3, hemoglobin portion 46, plate count 326  Sed rate 15    8/13/2024  Creatinine 0.88, GFR 73  ALT 49, AST 46  CRP 7.34  WBC 6.6, Hgb 14.6, Plt 300  Sed rate 21

## 2025-02-13 DIAGNOSIS — Z79.4 TYPE 2 DIABETES MELLITUS WITH HYPERGLYCEMIA, WITH LONG-TERM CURRENT USE OF INSULIN (H): ICD-10-CM

## 2025-02-13 DIAGNOSIS — E11.65 TYPE 2 DIABETES MELLITUS WITH HYPERGLYCEMIA, WITH LONG-TERM CURRENT USE OF INSULIN (H): ICD-10-CM

## 2025-02-13 RX ORDER — DAPAGLIFLOZIN 10 MG/1
10 TABLET, FILM COATED ORAL DAILY
Qty: 100 TABLET | Refills: 2 | Status: SHIPPED | OUTPATIENT
Start: 2025-02-13

## 2025-03-16 ENCOUNTER — HEALTH MAINTENANCE LETTER (OUTPATIENT)
Age: 65
End: 2025-03-16

## 2025-04-16 DIAGNOSIS — E11.65 TYPE 2 DIABETES MELLITUS WITH HYPERGLYCEMIA, WITH LONG-TERM CURRENT USE OF INSULIN (H): ICD-10-CM

## 2025-04-16 DIAGNOSIS — Z79.4 TYPE 2 DIABETES MELLITUS WITH HYPERGLYCEMIA, WITH LONG-TERM CURRENT USE OF INSULIN (H): ICD-10-CM

## 2025-04-16 DIAGNOSIS — J45.40 MODERATE PERSISTENT ASTHMA WITHOUT COMPLICATION: ICD-10-CM

## 2025-04-16 RX ORDER — IPRATROPIUM BROMIDE AND ALBUTEROL SULFATE 2.5; .5 MG/3ML; MG/3ML
SOLUTION RESPIRATORY (INHALATION)
Qty: 360 ML | Refills: 11 | Status: SHIPPED | OUTPATIENT
Start: 2025-04-16

## 2025-04-16 RX ORDER — SYRINGE, DISPOSABLE, 1 ML
SYRINGE, EMPTY DISPOSABLE MISCELLANEOUS
Qty: 12 EACH | Refills: 2 | Status: SHIPPED | OUTPATIENT
Start: 2025-04-16

## 2025-04-16 RX ORDER — TIRZEPATIDE 2.5 MG/.5ML
INJECTION, SOLUTION SUBCUTANEOUS
Qty: 2 ML | Refills: 0 | Status: SHIPPED | OUTPATIENT
Start: 2025-04-16

## 2025-04-17 DIAGNOSIS — M06.00 SERONEGATIVE EROSIVE RHEUMATOID ARTHRITIS (H): ICD-10-CM

## 2025-04-17 DIAGNOSIS — Z79.899 HIGH RISK MEDICATION USE: ICD-10-CM

## 2025-04-17 NOTE — TELEPHONE ENCOUNTER
Pending Prescriptions:                       Disp   Refills    Methotrexate Sodium 250 MG/10ML SOLN      12 mL  0            Sig: Inject 0.9 mLs subcutaneously once a week.

## 2025-04-18 NOTE — TELEPHONE ENCOUNTER
Left message for pt to have labs done and refill was sent.  Liana PAIGE RN BSN PHN  Specialty Clinics

## 2025-04-18 NOTE — TELEPHONE ENCOUNTER
I did refill her methotrexate, however we do need to have her liver function test checked.  The rest of her labs were done with her primary care provider on March 28 but they did not check liver function tests and these do need to be monitored with methotrexate.    Jessica Dahl, [facility]

## 2025-04-21 ENCOUNTER — VIRTUAL VISIT (OUTPATIENT)
Dept: PHARMACY | Facility: OTHER | Age: 65
End: 2025-04-21
Attending: PHYSICIAN ASSISTANT
Payer: COMMERCIAL

## 2025-04-21 DIAGNOSIS — M06.00 SERONEGATIVE EROSIVE RHEUMATOID ARTHRITIS (H): Primary | ICD-10-CM

## 2025-04-21 DIAGNOSIS — Z79.899 HIGH RISK MEDICATION USE: ICD-10-CM

## 2025-04-21 NOTE — Clinical Note
Her RAPID3 score went up to 15.66 and was 11.999 prior to starting Enbrel. She's currently on 22.5 mg methotrexate per week, what do you think about increasing it to 25 mg weekly? She's due for labs so might be best to get those results before increasing methotrexate. I'm going to send the labs to [facility] so she can have them taken closer to her house.  Luis Antonio Dee, PharmD Medication Therapy Management Pharmacist [facility]

## 2025-04-21 NOTE — PATIENT INSTRUCTIONS
"Recommendations from today's MTM visit:                                                       Continue current medication regimen  Luis Antonio will discuss potential dose increase of methotrexate with Jessica Dahl  Have labs ordered by Jessica Dahl checked within next 1-2 weeks  Call [phone] to schedule visit with Jessica Dahl for approximately 5/29/2025    Follow-up: Return in 16 weeks (on 8/11/2025) for Follow up, with me, using a phone visit.    It was great speaking with you today.  I value your experience and would be very thankful for your time in providing feedback in our clinic survey. In the next few days, you may receive an email or text message from [facility] [employer] with a link to a survey related to your  clinical pharmacist.\"     To schedule another MTM appointment, please call the clinic directly or you may call the MTM scheduling line at [phone] or toll-free at [phone].     My Clinical Pharmacist's contact information:                                                      Please feel free to contact me with any questions or concerns you have.      Luis Antonio Dee, PharmD  Medication Therapy Management Pharmacist  [facility]  Phone: [phone]   "

## 2025-04-21 NOTE — PROGRESS NOTES
Medication Therapy Management (MTM) Encounter    ASSESSMENT:                            Medication Adherence/Access: See below for considerations.    Seronegative erosive rheumatoid arthritis  Discussed that it can take 3 to 6 months before seeing effect from Enbrel. Patient may benefit from increasing methotrexate dose to 25 mg once weekly. Will discuss this with her rheumatologist. She is due for lab monitoring, recommend waiting until after labs are completed before potential dose increase of methotrexate. - Jessica Dahl approved dose increase of methotrexate on 4/22/2025. Will wait for lab results and send new order after labs are reviewed.    PLAN:                            Continue current medication regimen  Luis Antonio will discuss potential dose increase of methotrexate with Jessica Dahl  Have labs ordered by Jessica Dahl checked within next 1-2 weeks  Call [phone] to schedule visit with Jessica Dahl for approximately 5/29/2025    Follow-up: Return in 16 weeks (on 8/11/2025) for Follow up, with me, using a phone visit.    SUBJECTIVE/OBJECTIVE:                          Rocio Price is a 65 year old female seen for a follow-up visit from 1/20/2025. She was referred to me from Jessica Dahl PA-C. Patient also works with primary care MTM pharmacist and is scheduled with them on 5/12/2025       Reason for visit: Rheumatoid arthritis follow up.    Allergies/ADRs: Reviewed in chart  Past Medical History: Reviewed in chart  Tobacco: She reports that she has never smoked. She has never been exposed to tobacco smoke. She has never used smokeless tobacco.  Alcohol: Less than 1 beverage / month    Medication Adherence/Access: She is out of methotrexate for her dose this Wednesday and requests refills be sent "[employer]. Requests lab orders be sent to [facility] (phone number: [phone]). Lab orders faxed to [facility] on 4/22/2025.    Seronegative erosive rheumatoid arthritis   Enbrel 50 mg  subcutaneously every 7 days (started approximately 1/22/2025, last dose 4/16/2025)  Methotrexate 22.5 mg subcutaneously once weekly on Wednesdays  Folic acid 2 mg once daily  Acetaminophen 1000 mg twice daily AM and late afternoon    Patient reports she is just starting to notice effect from Enbrel. Has morning stiffness in her hands and toes (primary left side) that improves throughout the day and returns at night. Reports swelling in her right ankle and right arm (wrist to elbow). Continues to have pain in her knees and has been holding off on getting steroid injections. Reports no side effects or troubles administering her medications.    Previous therapies: Humira     Last lab monitoring completed: 1/31/2025  RAPID3 score: 15.66 (previously 11.999 on 1/30/2025)    Today's Vitals: LMP  (LMP Unknown)   ----------------    I spent 20 minutes with this patient today. All changes were made via collaborative practice agreement with Jessica Dahl.     A summary of these recommendations was sent via [employer].    Luis Antonio Dee, PharmD  Medication Therapy Management Pharmacist  [facility]  Phone: [phone]    Telemedicine Visit Details  The patient's medications can be safely assessed via a telemedicine encounter.  Type of service:  Telephone visit  Originating Location (pt. Location): Home    Distant Location (provider location):  On-site  Start Time: 9:30 AM  End Time:  9:50 AM     Medication Therapy Recommendations  Seronegative erosive rheumatoid arthritis (H)   1 Current Medication: Methotrexate Sodium 250 MG/10ML SOLN   Current Medication Sig: Inject 0.9 mLs subcutaneously once a week.   Rationale: Dose too low - Dosage too low - Effectiveness   Recommendation: Increase Dose   Status: Contact Provider - Awaiting Response   Identified Date: 4/21/2025         2 Current Medication: Methotrexate Sodium 250 MG/10ML SOLN   Current Medication Sig: Inject 0.9 mLs subcutaneously once a  week.   Rationale: Medication requires monitoring - Needs additional monitoring   Recommendation: Continue to Monitor   Status: Patient Agreed - Adherence/Education   Identified Date: 4/21/2025 Completed Date: 4/21/2025

## 2025-05-15 DIAGNOSIS — I50.9 CONGESTIVE HEART FAILURE, UNSPECIFIED HF CHRONICITY, UNSPECIFIED HEART FAILURE TYPE (H): ICD-10-CM

## 2025-05-15 RX ORDER — FUROSEMIDE 20 MG/1
20 TABLET ORAL 2 TIMES DAILY
Qty: 180 TABLET | Refills: 0 | Status: SHIPPED | OUTPATIENT
Start: 2025-05-15

## 2025-05-19 ENCOUNTER — VIRTUAL VISIT (OUTPATIENT)
Dept: PHARMACY | Facility: CLINIC | Age: 65
End: 2025-05-19
Payer: COMMERCIAL

## 2025-05-19 DIAGNOSIS — Z79.4 TYPE 2 DIABETES MELLITUS WITH HYPERGLYCEMIA, WITH LONG-TERM CURRENT USE OF INSULIN (H): Primary | ICD-10-CM

## 2025-05-19 DIAGNOSIS — G25.81 RESTLESS LEG SYNDROME: ICD-10-CM

## 2025-05-19 DIAGNOSIS — M06.00 SERONEGATIVE EROSIVE RHEUMATOID ARTHRITIS (H): ICD-10-CM

## 2025-05-19 DIAGNOSIS — M05.79 RHEUMATOID ARTHRITIS INVOLVING MULTIPLE SITES WITH POSITIVE RHEUMATOID FACTOR (H): ICD-10-CM

## 2025-05-19 DIAGNOSIS — G62.9 NEUROPATHY: ICD-10-CM

## 2025-05-19 DIAGNOSIS — L90.0 LICHEN SCLEROSUS: ICD-10-CM

## 2025-05-19 DIAGNOSIS — E78.5 HYPERLIPIDEMIA, UNSPECIFIED HYPERLIPIDEMIA TYPE: ICD-10-CM

## 2025-05-19 DIAGNOSIS — J45.40 MODERATE PERSISTENT ASTHMA WITHOUT COMPLICATION: ICD-10-CM

## 2025-05-19 DIAGNOSIS — F32.A DEPRESSION, UNSPECIFIED DEPRESSION TYPE: ICD-10-CM

## 2025-05-19 DIAGNOSIS — E11.65 TYPE 2 DIABETES MELLITUS WITH HYPERGLYCEMIA, WITH LONG-TERM CURRENT USE OF INSULIN (H): Primary | ICD-10-CM

## 2025-05-19 DIAGNOSIS — I25.10 ATHEROSCLEROSIS OF NATIVE CORONARY ARTERY OF NATIVE HEART WITHOUT ANGINA PECTORIS: ICD-10-CM

## 2025-05-19 DIAGNOSIS — I50.9 CONGESTIVE HEART FAILURE, UNSPECIFIED HF CHRONICITY, UNSPECIFIED HEART FAILURE TYPE (H): ICD-10-CM

## 2025-05-19 DIAGNOSIS — J30.1 SEASONAL ALLERGIC RHINITIS DUE TO POLLEN: ICD-10-CM

## 2025-05-19 DIAGNOSIS — K21.00 GASTROESOPHAGEAL REFLUX DISEASE WITH ESOPHAGITIS WITHOUT HEMORRHAGE: ICD-10-CM

## 2025-05-19 DIAGNOSIS — Z79.899 HIGH RISK MEDICATION USE: ICD-10-CM

## 2025-05-19 DIAGNOSIS — R10.9 ABDOMINAL CRAMPING: ICD-10-CM

## 2025-05-19 RX ORDER — FLUTICASONE PROPIONATE 50 MCG
1-2 SPRAY, SUSPENSION (ML) NASAL DAILY
COMMUNITY

## 2025-05-19 RX ORDER — ATORVASTATIN CALCIUM 80 MG/1
80 TABLET, FILM COATED ORAL DAILY
Qty: 90 TABLET | Refills: 3 | Status: SHIPPED | OUTPATIENT
Start: 2025-05-19

## 2025-05-19 RX ORDER — OLOPATADINE HYDROCHLORIDE 1 MG/ML
1 SOLUTION OPHTHALMIC DAILY
COMMUNITY
End: 2025-05-19

## 2025-05-19 RX ORDER — CETIRIZINE HYDROCHLORIDE 10 MG/1
10 TABLET ORAL DAILY
COMMUNITY
End: 2025-05-19

## 2025-05-19 RX ORDER — OLOPATADINE HYDROCHLORIDE 1 MG/ML
1 SOLUTION OPHTHALMIC 2 TIMES DAILY
COMMUNITY
Start: 2025-05-19

## 2025-05-19 NOTE — Clinical Note
DESTINEYI - saw patient for a comprehensive medication review. Recommended she restart daily low-dose aspirin and she thought there may have been concern with her other medications and she had been instructed to stop. Any issues that you would have with her restarting?  Jada Clemens, PharmD Medication Therapy Management Pharmacy Resident [facility]

## 2025-05-19 NOTE — PROGRESS NOTES
Medication Therapy Management (MTM) Encounter    ASSESSMENT:                            Medication Adherence/Access: No issues identified.    Diabetes   A1c not at goal <7%, UACR at goal <30 mg/g. Would recommend continuing to titrate Mounjaro dose for additional blood sugar control. As we continue to increase Mounaro dose, would be beneficial to start using Renetta sensors again to see the effects of these changes.   Eye and foot exams UTD, CAD indicated patient for secondary ASCVD prevention so would recommend restarting daily low-dose aspirin.     Hyperlipidemia   LDL not at goal <55 mg/dL based on secondary ASCVD prevention, would recommend increasing atorvastatin to 80 mg daily and rechecking lipid panel in 6-8 weeks.    Allergy   Do not recommend use of both Claritin and Zyrtec due to duplicate therapy. Would recommend treating other symptoms with more targeted therapy like increasing eye drop use and restarting a nasal spray. Do not prefer pseudoephedrine use in older adults, but patient seems to be tolerating and still struggling to control symptoms.     Asthma   Patient is appropriately adjusting Symbicort use based on symptoms, will continue to monitor.     GERD    Stable.     Heart Failure /CAD  HFpEF (>/=50%)   Blood pressures have been near or at goal <130/80 mmHg. Recommend restarting aspirin due to CAD as discussed above.    Mental Health   Depression and Restless Legs Syndrome  Stable.     Neuropathy   Stable.     Lichen Sclerosus   Stable.    Abdominal Cramping  Stable.    Rheumatoid Arthritis  Plan in place to continue follow up with rheumatology. Methotrexate levels may be affected by aspirin, but typically no concern when only used at low doses for cardiovascular prevention.    PLAN:                            Increase atorvastatin to 80 mg daily, I will send in an updated prescription to the pharmacy but you can finish up your supply at home by taking two tablets daily  Increase Mounjaro to 7.5 mg  "weekly  Restart aspirin 81 mg daily  Consider starting to use the Renetta sensors again as we continue to increase your Mounjaro dose  Stop using Zyrtec  You can increase the Pataday eye drops to twice daily if needed  Start flonase 1-2 sprays in each nostril daily    Follow-up: 6/11 at 10:30 AM for a phone call    SUBJECTIVE/OBJECTIVE:                          Rocio Price is a 65 year old female called for an initial visit with me. She also follows with my MTM colleagues in rheumatology and [place].      Reason for visit: Annual medication review.    Allergies/ADRs: Reviewed in chart  Past Medical History: Reviewed in chart  Tobacco: She reports that she has never smoked. She has never been exposed to tobacco smoke. She has never used smokeless tobacco.  Alcohol: very seldomly, maybe 1 drink in 3 months  Caffeine: 2 cups of coffee in the morning, max 2 cans of Coke    Medication Adherence/Access: no issues reported.    Diabetes   Mounjaro 5 mg weekly (switched from Ozempic after March A1c), on current dose for ~1 month  Farxiga 10 mg daily  Glucose tablets as needed - has not needed  Patient reports no medication side effects.  She hasn't checked blood sugar \"in awhile\". Plans to start Renetta, has sensors at home.  Current diabetes symptoms: None  Diet: thinks maybe a little bit lowering appetite with switch from Ozempic    Recent medication history  Stopped metformin ~6/2024 because it made her feel tired, had \"zero\" energy  Farxiga stopped 09/2023 in the past due to elevated creatinine, has been restarted  Meal time insulin (Novolog) stopped when Ozempic was started  Previously on insulin, patient prefers to avoid     Blood sugar monitoring: not currently  Eye exam is up to date  Foot exam is up to date    Hyperlipidemia   Atorvastatin 40 mg daily  Not fasting for last lipid panel. Patient reports no significant myalgias or other side effects.     Allergy   Claritin-D 5/120 mg 1 tablet daily  Zyrtec 10 mg " daily  Patanol 0.1% 1 drop in each eye daily  Patient reports no current medication side effects.  Patient feels that current therapy is somewhat effective. Main symptoms are related to itchy/watery eyes.  Did previously use Flonase which she felt was helpful.     Asthma   Symbicort 1-2 puffs as needed - has been using 2 puffs once daily  Duo-Neb as needed - not used recently  Saline nebs as needed - not used recently  Montelukast 10 mg at bedtime  Patient rinses their mouth after using steroid inhaler.   Patient reports no current medication side effects.  Patient reports the following symptoms: increased shortness of breath at rest and increased shortness of breath upon exertion. Symbicort has been helpful.     GERD    Pantoprazole 40 mg once daily   Patient reports no current symptoms.   Patient feels that current regimen is effective. Symptoms do come back with a missed dose. No side effects reported.      Heart Failure /CAD  HFpEF (>/=50%)   Beta Blocker: metoprolol XL 50 mg daily  SGLT2i: Farxiga 10 mg daily  Diuretic(s): furosemide 20 mg twice daily  Aspirin 81 mg daily - has not been taking, thought she was instructed to stop this  Patient reports no current medication side effects.     Patient reports these HF symptoms: none.    Patient self-monitors blood pressure occasionally.  Home BP monitoring 120/86 mmHg.  Last echo (9/4/24) showed LVEF of 55-60%     Mental Health   Depression and Restless Legs Syndrome  Bupropion  mg once daily  Citalopram 20 mg once daily  Pramipexole 0.25 mg three times daily  Patient reports no current medication side effects.  Patient reports symptoms are stable.      Neuropathy  Gabapentin 300-600 mg three times daily, typically just 300 mg three times daily  Pain type/location: neuropathy  Patient reports the following side effects: no medication side effects    Lichen Sclerosus   Clobetasol 0.05% ointment twice daily as needed - has been using occasionally  Patient  reports this is helpful, no concerns    Abdominal Cramping  Dicyclomine 10 mg four times daily as needed - has not needed to use  Patient reports no concerns    Rheumatoid Arthritis  Enbrel 50 mg weekly  Methotrexate 22.5 mg weekly + folic acid 2 mg daily  Acetaminophen 500-1000 mg as needed - usually using about twice daily, sometimes will take in the morning as well  Patient reports no concerns  Followed by rheumatology MTM    Today's Vitals: LMP  (LMP Unknown)   ----------------    I spent 42 minutes with this patient today. All changes were made via collaborative practice agreement with Mechelle Erazo PA-C. A copy of the visit note was provided to the patient's provider(s).    A summary of these recommendations was sent via [employer].    Jada Clemens PharmD  Medication Therapy Management Pharmacy Resident    The patient was seen independently by Dr. Clemens.  I have read the note and agree with the assessment and plan.  Priscilla Muñoz PharmD, [facility]  Medication Therapy Management Provider  [phone]      Telemedicine Visit Details  The patient's medications can be safely assessed via a telemedicine encounter.  Type of service:  Telephone visit  Originating Location (pt. Location): Home    Distant Location (provider location):  On-site  Start Time: 10:02 AM  End Time: 10:44 AM     Medication Therapy Recommendations  Atherosclerotic heart disease of native coronary artery without angina pectoris   1 Current Medication: aspirin (ASA) 81 MG EC tablet   Current Medication Sig: Take 1 tablet (81 mg) by mouth daily   Rationale: Does not understand instructions - Adherence - Adherence   Recommendation: Provide Education - Patient didn't know she should still be taking   Status: Patient Agreed - Adherence/Education   Identified Date: 5/19/2025 Completed Date: 5/19/2025         Hyperlipidemia   1 Current Medication: atorvastatin (LIPITOR) 40 MG tablet (Discontinued)   Current Medication Sig: TAKE 1 TABLET BY MOUTH DAILY    Rationale: Dose too low - Dosage too low - Effectiveness   Recommendation: Increase Dose - atorvastatin 80 MG tablet   Status: Accepted per CPA   Identified Date: 5/19/2025 Completed Date: 5/19/2025         Seasonal allergic rhinitis due to pollen   1 Current Medication: cetirizine (ZYRTEC) 10 MG tablet (Discontinued)   Current Medication Sig: Take 10 mg by mouth daily.   Rationale: Duplicate Therapy - Unnecessary medication therapy - Indication   Recommendation: Discontinue Medication   Status: Accepted per CPA   Identified Date: 5/19/2025 Completed Date: 5/19/2025         2 Current Medication: loratadine-pseudoePHEDrine (CLARITIN-D 12-HOUR) 5-120 MG 12 hr tablet   Current Medication Sig: Take 1 tablet by mouth daily   Rationale: Synergistic therapy - Needs additional medication therapy - Indication   Recommendation: Start Medication - fluticasone 50 MCG/ACT nasal spray   Status: Accepted per CPA   Identified Date: 5/19/2025 Completed Date: 5/19/2025         3 Current Medication: olopatadine (PATANOL) 0.1 % ophthalmic solution   Current Medication Sig: Place 1 drop into both eyes 2 times daily.   Rationale: Dose too low - Dosage too low - Effectiveness   Recommendation: Increase Dose - increase to BID   Status: Accepted per CPA   Identified Date: 5/19/2025 Completed Date: 5/19/2025         Type 2 diabetes mellitus with hyperglycemia, with long-term current use of insulin (H)   1 Current Medication: tirzepatide (MOUNJARO) 5 MG/0.5ML SOAJ auto-injector pen (Discontinued)   Current Medication Sig: Inject 0.5 mLs (5 mg) subcutaneously once a week.   Rationale: Dose too low - Dosage too low - Effectiveness   Recommendation: Increase Dose - Mounjaro 7.5 MG/0.5ML Soaj   Status: Accepted per CPA   Identified Date: 5/19/2025 Completed Date: 5/19/2025

## 2025-05-19 NOTE — TELEPHONE ENCOUNTER
1 month israel refill provided.  Please remind patient to have labs done either this week or next week.  When she has those labs completed we can send in a 3-month refill if they are normal.    Jessica Dahl, [facility]

## 2025-05-19 NOTE — PATIENT INSTRUCTIONS
"Recommendations from today's MTM visit:                                                    MTM (medication therapy management) is a service provided by a clinical pharmacist designed to help you get the most of out of your medicines.   Today we reviewed what your medicines are for, how to know if they are working, that your medicines are safe and how to make your medicine regimen as easy as possible.      Increase atorvastatin to 80 mg daily, I will send in an updated prescription to the pharmacy but you can finish up your supply at home by taking two tablets daily  Increase Mounjaro to 7.5 mg weekly  Restart aspirin 81 mg daily  Consider starting to use the Renetta sensors again as we continue to increase your Mounjaro dose  Stop using Zyrtec  You can increase the Pataday eye drops to twice daily if needed  Start flonase 1-2 sprays in each nostril daily    Follow-up: 6/11 at 10:30 AM for a phone call    It was great speaking with you today.  I value your experience and would be very thankful for your time in providing feedback in our clinic survey. In the next few days, you may receive an email or text message from [employer] with a link to a survey related to your  clinical pharmacist.\"     To schedule another MTM appointment, please call the clinic directly or you may call the MTM scheduling line at [phone] or toll-free at [phone].     My Clinical Pharmacist's contact information:                                                      Please feel free to contact me with any questions or concerns you have.      Jada Clemens, PharmD  Medication Therapy Management Pharmacy Resident  [facility]   "

## 2025-05-19 NOTE — Clinical Note
FYI - saw patient for an MTM visit.  Jada Clemens, PharmD Medication Therapy Management Pharmacy Resident [facility]

## 2025-05-20 NOTE — TELEPHONE ENCOUNTER
[facility] PA-C      5/19/25  3:26 PM  Note  1 month israel refill provided.  Please remind patient to have labs done either this week or next week.  When she has those labs completed we can send in a 3-month refill if they are normal.     Jessica Dahl, [facility] Rheumatology

## 2025-05-22 NOTE — TELEPHONE ENCOUNTER
05.22- x1 Left message for patient to call and schedule a lab only appointment asap.     Per provider 1 month israel refill provided. Please remind patient to have labs done either this week or next week. When she has those labs completed we can send in a 3-month refill if they are normal.

## 2025-05-27 NOTE — TELEPHONE ENCOUNTER
5/27-Left message  x2 for patient to call and schedule a lab only appointment asap.      Per provider 1 month israel refill provided. Please remind patient to have labs done either this week or next week. When she has those labs completed we can send in a 3-month refill if they are normal.

## 2025-05-29 NOTE — TELEPHONE ENCOUNTER
5/29- per patient she is going to have labs completed at [facility] next week. She is currently out of town and will schedule with [facility] when she returns.

## 2025-06-09 NOTE — TELEPHONE ENCOUNTER
6/9-LMTCB x3    1 month israel refill provided.  Please remind patient to have labs done either this week or next week.  When she has those labs completed we can send in a 3-month refill if they are normal.     Jessica Dahl, [facility] Rheumatology

## 2025-06-11 ENCOUNTER — VIRTUAL VISIT (OUTPATIENT)
Dept: PHARMACY | Facility: CLINIC | Age: 65
End: 2025-06-11
Payer: COMMERCIAL

## 2025-06-11 DIAGNOSIS — Z79.4 TYPE 2 DIABETES MELLITUS WITH HYPERGLYCEMIA, WITH LONG-TERM CURRENT USE OF INSULIN (H): Primary | ICD-10-CM

## 2025-06-11 DIAGNOSIS — E78.5 HYPERLIPIDEMIA, UNSPECIFIED HYPERLIPIDEMIA TYPE: ICD-10-CM

## 2025-06-11 DIAGNOSIS — E11.65 TYPE 2 DIABETES MELLITUS WITH HYPERGLYCEMIA, WITH LONG-TERM CURRENT USE OF INSULIN (H): Primary | ICD-10-CM

## 2025-06-11 DIAGNOSIS — J30.1 SEASONAL ALLERGIC RHINITIS DUE TO POLLEN: ICD-10-CM

## 2025-06-11 ASSESSMENT — PATIENT HEALTH QUESTIONNAIRE - PHQ9: SUM OF ALL RESPONSES TO PHQ QUESTIONS 1-9: 3

## 2025-06-11 NOTE — PATIENT INSTRUCTIONS
"Recommendations from today's MTM visit:                                                    MTM (medication therapy management) is a service provided by a clinical pharmacist designed to help you get the most of out of your medicines.   Today we reviewed what your medicines are for, how to know if they are working, that your medicines are safe and how to make your medicine regimen as easy as possible.      Continue your current medications  Get your A1c and cholesterol checked sometime in early July  Try the Flonase 1-2 spras in each nostril daily    Follow-up: [facility] with lab results    It was great speaking with you today.  I value your experience and would be very thankful for your time in providing feedback in our clinic survey. In the next few days, you may receive an email or text message from [employer] with a link to a survey related to your  clinical pharmacist.\"     To schedule another MTM appointment, please call the clinic directly or you may call the MTM scheduling line at [phone] or toll-free at [phone].     My Clinical Pharmacist's contact information:                                                      Please feel free to contact me with any questions or concerns you have.      Jada Clemens, PharmD  Medication Therapy Management Pharmacy Resident  [phone]   "

## 2025-06-11 NOTE — PROGRESS NOTES
"Medication Therapy Management (MTM) Encounter    ASSESSMENT:                            Medication Adherence/Access: No issues identified.    Diabetes  Last A1c not at goal <7% prior to switching to Mounjaro, due for recheck after 6/28. UACR at goal <30 mg/g. Would encourage patient to start checking blood sugars with Renetta again so we can make a better assessment of blood sugar control.   Appropriately on aspirin, eye and foot exam UTD.     Hyperlipidemia   Due for lipid panel recheck in the beginning of July to assess efficacy of increased atorvastatin dose.     Allergy   Symptoms seem to be improving, may improve more once patient starts using Flonase as previously discussed.    PLAN:                            Continue your current medications  Get your A1c and cholesterol checked sometime in early July  Try the Flonase 1-2 spras in each nostril daily    Follow-up: MyChart with lab results    SUBJECTIVE/OBJECTIVE:                          Rocio Price is a 65 year old female called for a follow-up visit.       Reason for visit: diabetes and allergy follow up.    Allergies/ADRs: Reviewed in chart  Past Medical History: Reviewed in chart  Tobacco: She reports that she has never smoked. She has never been exposed to tobacco smoke. She has never used smokeless tobacco.  Alcohol: no change from previous visit    Medication Adherence/Access: no issues reported.    Diabetes   Mounjaro 7.5 mg weekly - has only had about 1 dose of this  Farxiga 10 mg daily  Glucose tablets as needed - has not needed  Aspirin 81 mg daily for secondary prevention  Patient reports no medication side effects.  Current diabetes symptoms: None  Diet: thinks maybe a little bit lowering appetite with switch from Ozempic    Recent medication history  Stopped metformin ~6/2024 because it made her feel tired, had \"zero\" energy  Farxiga stopped 09/2023 in the past due to elevated creatinine, has been restarted  Meal time insulin (Novolog) stopped " when Ozempic was started  Previously on insulin, patient prefers to avoid     Blood sugar monitoring: planning to restart using Renetta  Eye exam is up to date  Foot exam is up to date    Hyperlipidemia   Atorvastatin 80 mg daily  Patient reports no significant myalgias or other side effects.     Allergy   Claritin-D 5/120 mg 1 tablet daily  Patanol 0.1% 1 drop in each eye twice daily  Flonase 1-2 sprays in each nostril daily - has not picked up from the pharmacy  Patient reports no current medication side effects.  Patient feels that current therapy is somewhat effective. Main symptoms are related to itchy/watery eyes. Thinks things are slightly better than at last visit.         Today's Vitals: LMP  (LMP Unknown)   ----------------    I spent 12 minutes with this patient today. All changes were made via collaborative practice agreement with Mechelle Erazo PA-C. A copy of the visit note was provided to the patient's provider(s).    A summary of these recommendations was sent via [employer].    Jada Clemens PharmD  Medication Therapy Management Pharmacy Resident  [phone]    The patient was seen independently by Dr. Clemens.  I have read the note and agree with the assessment and plan.  Priscilla Muñoz PharmD, [facility]  Medication Therapy Management Provider  [phone]      Telemedicine Visit Details  The patient's medications can be safely assessed via a telemedicine encounter.  Type of service:  Telephone visit  Originating Location (pt. Location): Home    Distant Location (provider location):  On-site  Start Time: 10:36 AM  End Time: 10:48 AM     Medication Therapy Recommendations  No medication therapy recommendations to display

## 2025-06-11 NOTE — Clinical Note
FYI - saw patient for an MTM visit.  Jada Clemens, PharmD Medication Therapy Management Pharmacy Resident [facility]

## 2025-06-12 NOTE — TELEPHONE ENCOUNTER
I called pt to confirm which [facility] did she want us to fax her orders to, I left a vm for pt to return call.

## 2025-06-12 NOTE — TELEPHONE ENCOUNTER
Please send orders to the [facility].    Patient will have labs on 6/16 at the [facility].

## 2025-06-13 DIAGNOSIS — Z79.899 HIGH RISK MEDICATION USE: ICD-10-CM

## 2025-06-13 DIAGNOSIS — M06.00 SERONEGATIVE EROSIVE RHEUMATOID ARTHRITIS (H): ICD-10-CM

## 2025-06-16 ENCOUNTER — MYC MEDICAL ADVICE (OUTPATIENT)
Dept: SURGERY | Facility: CLINIC | Age: 65
End: 2025-06-16
Payer: COMMERCIAL

## 2025-06-17 NOTE — TELEPHONE ENCOUNTER
Per TE:   Faxed lab orders to [phone] ( Jessica Dahl). Received confirmation on [employer].     Copy in drawer.        [facility]   Clinic Station    [facility]  [phone]

## 2025-06-17 NOTE — TELEPHONE ENCOUNTER
Called pt and let her know that the lab orders were faxed and that she has to call to schedule. I also made and appt for a follow up with Hailey Thomas   Clinic Station    "[email or web address]  [phone]

## 2025-06-18 NOTE — TELEPHONE ENCOUNTER
Methotrexate refilled. Please have her let us know when her labs are as we need to see where her liver function tests are as they were elevated in April    Jessica Dahl [facility] Rheumatology

## 2025-06-18 NOTE — TELEPHONE ENCOUNTER
Pt has next appt scheduled and is arranging for her labs thru [facility].    Last labs OK'd by provider.  Patient is overdue for April appt.    Will forward to Provider for injectable Methotrexate fill.    Monse WEBER   [facility]

## 2025-06-19 ENCOUNTER — LAB (OUTPATIENT)
Dept: LAB | Facility: CLINIC | Age: 65
End: 2025-06-19
Payer: COMMERCIAL

## 2025-06-19 DIAGNOSIS — Z79.899 HIGH RISK MEDICATION USE: ICD-10-CM

## 2025-06-19 DIAGNOSIS — Z79.4 TYPE 2 DIABETES MELLITUS WITH HYPERGLYCEMIA, WITH LONG-TERM CURRENT USE OF INSULIN (H): ICD-10-CM

## 2025-06-19 DIAGNOSIS — E11.65 TYPE 2 DIABETES MELLITUS WITH HYPERGLYCEMIA, WITH LONG-TERM CURRENT USE OF INSULIN (H): ICD-10-CM

## 2025-06-19 DIAGNOSIS — E78.5 HYPERLIPIDEMIA, UNSPECIFIED HYPERLIPIDEMIA TYPE: ICD-10-CM

## 2025-06-19 DIAGNOSIS — M06.00 SERONEGATIVE EROSIVE RHEUMATOID ARTHRITIS (H): ICD-10-CM

## 2025-06-19 LAB
ERYTHROCYTE [DISTWIDTH] IN BLOOD BY AUTOMATED COUNT: 13.9 % (ref 10–15)
ERYTHROCYTE [SEDIMENTATION RATE] IN BLOOD BY WESTERGREN METHOD: 24 MM/HR (ref 0–30)
EST. AVERAGE GLUCOSE BLD GHB EST-MCNC: 192 MG/DL
HBA1C MFR BLD: 8.3 % (ref 0–5.6)
HCT VFR BLD AUTO: 45.4 % (ref 35–47)
HGB BLD-MCNC: 15.1 G/DL (ref 11.7–15.7)
MCH RBC QN AUTO: 30.7 PG (ref 26.5–33)
MCHC RBC AUTO-ENTMCNC: 33.3 G/DL (ref 31.5–36.5)
MCV RBC AUTO: 92 FL (ref 78–100)
PLATELET # BLD AUTO: 310 10E3/UL (ref 150–450)
RBC # BLD AUTO: 4.92 10E6/UL (ref 3.8–5.2)
WBC # BLD AUTO: 9.4 10E3/UL (ref 4–11)

## 2025-06-19 PROCEDURE — 86140 C-REACTIVE PROTEIN: CPT

## 2025-06-19 PROCEDURE — 80061 LIPID PANEL: CPT

## 2025-06-19 PROCEDURE — 82040 ASSAY OF SERUM ALBUMIN: CPT

## 2025-06-19 PROCEDURE — 84460 ALANINE AMINO (ALT) (SGPT): CPT

## 2025-06-19 PROCEDURE — 80048 BASIC METABOLIC PNL TOTAL CA: CPT

## 2025-06-19 PROCEDURE — 84450 TRANSFERASE (AST) (SGOT): CPT

## 2025-06-22 ENCOUNTER — RESULTS FOLLOW-UP (OUTPATIENT)
Dept: PHARMACY | Facility: CLINIC | Age: 65
End: 2025-06-22
Payer: COMMERCIAL

## 2025-07-15 NOTE — PROGRESS NOTES
Rheumatology Clinic Visit  [facility]  [facility]     Rocio Price MRN# [id number]   YOB: 1960 Age: 65 year old   Date of Visit: 7/23/2025  Primary care provider: Behzad Correa          Assessment and Plan:     1.  Seronegative erosive rheumatoid arthritis  2.  High-risk medication use    Patient presents today to for follow up for her seronegative erosive rheumatoid arthritis.  Unfortunately the Enbrel has not been working very well for her.  She has been having a lot of pain and swelling particularly in her hands.  Recently she has been having a lot more knee pain.  Physical examination today does show very active synovitis over her 2nd through 4th MCPs bilaterally with tenderness particularly on the right MCPs.  At this time we will give her a course of prednisone as she is going out of town for a couple of weeks next week.  The plan will be for her to continue the methotrexate and the Enbrel while on her vacation and when she returns the plan will be to start Orencia.  I did send in the prescription for Orencia to get going on the prior authorization so that when she returns the medication will be ready to start.  Will check labs approximately 1 month after she starts the Orencia and then continue to monitor every 2 to 3 months while on methotrexate.  Patient will follow-up with me in 4 months, sooner if needed.      The longitudinal plan of care for the diagnosis(es)/condition(s) as documented were addressed during this visit. Due to the added complexity in care, I will continue to support Rocio in the subsequent management and with ongoing continuity of care.    Plan:     Schedule follow-up with Jessica Dahl PA-C in 4 months. Okay to be a video visit  Labs:  CBC, Creatinine, Albumin, AST, ALT, CRP and Sed Rate every 2-3 months, will do in about 6 weeks with starting the Orencia  Medication recommendations:   Continue Methotrexate 22.5mg (0.9mL SubQ) weekly.    WEEKLY. While on Methotrexate:  -check labs (ALT/AST, albumin, CBC with platelets and creatinine) every 3 months  -Limit alcohol to 2 drinks weekly  -Take Folic Acid 2mg daily   -Tylenol 500-1000mg can be used as needed up to three times daily for nausea/headache associated with dosing  Continue Enbrel 50mg/ml SubQ weekly until you return from your trip, then we will start Orencia  Start Orencia 125mg/ml SubQ weekly  # Abatacept (Orencia) Risks and Benefits: The risks and benefits of abatacept were discussed in detail and the patient verbalized understanding.  The risks discussed include, but are not limited to, the risk for hypersensitivity, anaphylaxis, anaphylactoid reactions, an increased risk for serious infections leading to hospitalization or death, and an increased risk for more frequent respiratory adverse events in patients with COPD.  If subcutaneous injections are used, then injection site reactions and/or pain may occur at the site of injection.  The most common side effects were discussed that include headache, upper respiratory tract infections, nasopharyngitis, and nausea.  It was discussed that the medication would need to be discontinued if a serious infection develops.  It was discussed that live vaccinations should not be received while using abatacept or within 30 days prior to starting abatacept.  I encouraged reviewing the package insert and asking any questions about the medication.      Jessica Dahl, [facility]  Rheumatology         History of Present Illness:   Rocio RODRIGUEZ Mensing presents for evaluation of rheumatoid arthritis.    Rheumatological history:  Provider(s): Dr. Wallace  Pertinent lab history: HLA B27 positive, elevated Sed Rate and CRP, negative MYRON,  negative CCP and RF  MRI showed erosions and synovitis  Previous medications tried: Prednisone (helpful),  Methotrexate, Humira (not effective)  Current medications: Enbrel Methotrexate 0.9mL weekly    Interval history July 23,  2025:  Joints are some times good and other times not good. She will get pain in her hands with swelling. She states that it is better than what it was. She has pain in her knees and some pain in her feet.     Interval history January 29, 2025:  She states that her joints have been a little bit sore. She has had some increased knee, hand and shoulder pain. She would like to give her treatment more time.She just started Enbrel last week. She is scheduled for labs on Friday.     Interval history August 12, 2024:  She states that her joints are a little better, she still has swelling and her joints are still achy. She notices a burning in her joints. She states that her left hand has gotten worse. Her right hand may be a little better, but still having swelling. She states that her knees have been more painful as well. She is looking to get seen for that. Her feet have been bothering her as well and she finds it difficult to walk at times.     Interval history February 12, 2024:   She states that overall things have been a little bit better. Most of her arthritis has been in her right hand. She states that her left hand is a little worse, but is better with restarting the Methotrexate. She states that she has been noticing increased pain in her knees. At times she does have swelling as well. She has been recommended in the past to get xrays and was told that she has arthritis. She continues to have swelling in her fingers as well.    Interval history November 13, 2023:  She states that she continues to have joint pain and swelling. She states that she is hurting more around the left ankle on the medial aspect and the lateral aspect on the right.  The left hand has not been as bad, but the right hand throbs and hurts.     HPI from consult of August 23, 2023:  She was diagnosed in 2021. She was put on Methotrexate and has been off of that for about 6 months. She has been having increased pain and swelling. She states  that her left hand has been getting worse. She did not feel the methotrexate was working for her. She was given Prednisone, which was slightly helpful. She states that [place] was discussing putting her on a different medication, but she cannot remember which medication it was. She gets pain from her right shoulder down the right arm. She is unable to sleep on her right side. She finds that she will get numbness down the arm as well. The right elbow will get swollen, and the swelling and tenderness can go down the arm as well. No shortness of breath.     No history of infections. No history of MI's or blood clots. She does have high blood pressure and diabetes. She has congestive heart failure. She has a history of diverticulitis.                Review of Systems:     Constitutional: negative  Skin: negative  Eyes: negative  Ears/Nose/Throat: negative  Respiratory: No shortness of breath, dyspnea on exertion, cough, or hemoptysis  Cardiovascular: negative  Gastrointestinal: negative  Genitourinary: negative  Musculoskeletal: as above  Neurologic: negative  Psychiatric: negative  Hematologic/Lymphatic/Immunologic: negative  Endocrine: negative         Active Problem List:     Patient Active Problem List    Diagnosis Date Noted    Acute pain of both shoulders 10/23/2023     Priority: Medium    Atherosclerotic heart disease of native coronary artery without angina pectoris 10/27/2022     Priority: Medium     Formatting of this note might be different from the original.  moderate LAD stenosis by CT angiography      History of malignant neoplasm of breast 10/27/2022     Priority: Medium     Formatting of this note might be different from the original.  2013      History of squamous cell carcinoma of skin 10/27/2022     Priority: Medium    Rheumatoid arthritis involving multiple sites with positive rheumatoid factor (H) 10/27/2022     Priority: Medium    Moderate persistent asthma 02/04/2022     Priority: Medium     Last  Assessment & Plan:   Formatting of this note might be different from the original.  Patient seems to be doing fairly well on her current regimen of Symbicort and albuterol.  Will continue to monitor.  Consider repeat PFTs further out from acute illness.      Depression 01/13/2022     Priority: Medium    Gastroesophageal reflux disease 01/13/2022     Priority: Medium    Hyperlipidemia 01/13/2022     Priority: Medium    Migraine headache 01/12/2022     Priority: Medium     Formatting of this note might be different from the original.  Associated with photophobia. Treated with over-the-counter medications, no prescriptions.      History of severe acute respiratory syndrome coronavirus 2 (SARS-CoV-2) disease 11/19/2021     Priority: Medium    Neuropathy, peripheral 02/04/2020     Priority: Medium    Congestive heart failure (H) 12/19/2018     Priority: Medium     Formatting of this note might be different from the original.  preserved ejection fraction, EF 60% ECHO 12/14/18    Last Assessment & Plan:   Formatting of this note might be different from the original.  HFpEF, EF 60% on Echo 12/14/2018  Continue aspirin 81 mg  Continue metoprolol succinate 50mg  Continue furosemide 40 mg daily      Type 2 diabetes mellitus with hyperglycemia, with long-term current use of insulin (H) 08/28/2017     Priority: Medium     Formatting of this note is different from the original.  Current Meds: Aspart insulin 6-10 units with meals as needed. Insulin glargine 50 units at bedtime. Metformin 1000 mg BID.     [place] D 4/5 (A1c)  Blood pressure: /64 (BP Location: Right arm, Patient Position: Sitting, Cuff Size: Large)  prior: 100/65  Statin: Atorvastatin 40mg   Aspirin: 81 mg daily  Tobacco: non-smoker  A1c:   Lab Results   Component Value Date    HGBA1C 10.5 (H) 01/11/2022    HGBA1C 10.8 (H) 09/08/2020    HGBA1C 12.6 (H) 01/10/2020     Renal Health: Stage 2 (eGFR 60-89)  Lab Results   Component Value Date    EGFRNONBLKAA 75  01/13/2022     Lab Results   Component Value Date    UMCROALBCONC <5.0 08/25/2017    CREATININEUR 108 01/10/2020    ALBCREARATIO <5 01/10/2020         Last Assessment & Plan:   Formatting of this note might be different from the original.  She has made great improvement since last check a few weeks ago both in diet and activity levels. This is greatly reflected in her blood sugar readings. I don't think we need to add additional medications at this time for diabetic control. Will recheck A1c in 2 months.      Morbid obesity (H) 04/14/2017     Priority: Medium    Female cystocele 06/03/2016     Priority: Medium    Obstructive sleep apnea syndrome in adult 11/11/2014     Priority: Medium     Last Assessment & Plan:   Formatting of this note might be different from the original.  Given evidence of significant CO2 retention despite improving respiratory symptoms and utilization of CPAP during hospitalization, will refer back to sleep medicine for consideration of adjustment of CPAP vs BiPAP.      Status post bilateral mastectomy 03/11/2014     Priority: Medium    Seasonal allergies 09/09/2013     Priority: Medium    Restless leg syndrome 11/17/2010     Priority: Medium    Sensorineural hearing loss (SNHL) of both ears 11/17/2010     Priority: Medium    Lichen sclerosus 09/14/2007     Priority: Medium            Past Medical History:   No past medical history on file.  Past Surgical History:   Procedure Laterality Date    HYSTERECTOMY, PAP NO LONGER INDICATED      HYSTERECTOMY, PAP NO LONGER INDICATED      MASTECTOMY, BILATERAL              Social History:     Social History     Socioeconomic History    Marital status:      Spouse name: Not on file    Number of children: Not on file    Years of education: Not on file    Highest education level: Not on file   Occupational History    Not on file   Tobacco Use    Smoking status: Never     Passive exposure: Never    Smokeless tobacco: Never   Vaping Use    Vaping  status: Never Used   Substance and Sexual Activity    Alcohol use: Not Currently    Drug use: Never    Sexual activity: Not on file   Other Topics Concern    Not on file   Social History Narrative    Not on file     Social Drivers of Health     Financial Resource Strain: Low Risk  (12/4/2024)    Financial Resource Strain     Within the past 12 months, have you or your family members you live with been unable to get utilities (heat, electricity) when it was really needed?: No   Food Insecurity: Low Risk  (12/4/2024)    Food Insecurity     Within the past 12 months, did you worry that your food would run out before you got money to buy more?: No     Within the past 12 months, did the food you bought just not last and you didn t have money to get more?: No   Transportation Needs: Low Risk  (12/4/2024)    Transportation Needs     Within the past 12 months, has lack of transportation kept you from medical appointments, getting your medicines, non-medical meetings or appointments, work, or from getting things that you need?: No   Physical Activity: Sufficiently Active (12/4/2024)    Exercise Vital Sign     Days of Exercise per Week: 7 days     Minutes of Exercise per Session: 30 min   Recent Concern: Physical Activity - Insufficiently Active (10/28/2024)    Exercise Vital Sign     Days of Exercise per Week: 7 days     Minutes of Exercise per Session: 10 min   Stress: No Stress Concern Present (12/4/2024)    [place] [employer] - Occupational Stress Questionnaire     Feeling of Stress : Only a little   Social Connections: Unknown (12/4/2024)    Social Connection and Isolation Panel [NHANES]     Frequency of Communication with Friends and Family: Not on file     Frequency of Social Gatherings with Friends and Family: Once a week     Attends [employer] Services: Not on file     Active Member of Clubs or Organizations: Not on file     Attends Club or Organization Meetings: Not on file     Marital Status:  Not on file   Interpersonal Safety: Low Risk  (12/6/2024)    Interpersonal Safety     Do you feel physically and emotionally safe where you currently live?: Yes     Within the past 12 months, have you been hit, slapped, kicked or otherwise physically hurt by someone?: No     Within the past 12 months, have you been humiliated or emotionally abused in other ways by your partner or ex-partner?: No   Housing Stability: Low Risk  (12/4/2024)    Housing Stability     Do you have housing? : Yes     Are you worried about losing your housing?: No          Family History:   No family history on file.         Allergies:     Allergies   Allergen Reactions    Seasonal Allergies Other (See Comments)     Itchy watery eyes            Medications:     Current Outpatient Medications   Medication Sig Dispense Refill    acetaminophen (TYLENOL) 500 MG tablet Take 1,000 mg by mouth 3 times daily as needed for mild pain.      aspirin (ASA) 81 MG EC tablet Take 1 tablet (81 mg) by mouth daily 90 tablet 3    atorvastatin (LIPITOR) 80 MG tablet Take 1 tablet (80 mg) by mouth daily. 90 tablet 3    budesonide-formoterol (SYMBICORT/BREYNA) 80-4.5 MCG/ACT Inhaler Inhale 1-2 puffs as needed. May use up to 12 puffs per day. 20.4 g 3    buPROPion (WELLBUTRIN SR) 150 MG 12 hr tablet Take 1 tablet (150 mg) by mouth daily. 90 tablet 4    citalopram (CELEXA) 20 MG tablet Take 1 tablet (20 mg) by mouth daily. 90 tablet 4    clobetasol (TEMOVATE) 0.05 % external ointment Apply topically 2 times daily as needed      Continuous Glucose Sensor (FREESTYLE REYNOLD 3 SENSOR) MISC 1 each every 14 days. 6 each 3    etanercept (ENBREL SURECLICK) 50 MG/ML autoinjector Inject 50 mg subcutaneously every 7 days. 4 mL 5    FARXIGA 10 MG TABS tablet TAKE 1 TABLET BY MOUTH DAILY 100 tablet 2    fluticasone (FLONASE) 50 MCG/ACT nasal spray Spray 1-2 sprays into both nostrils daily.      folic acid (FOLVITE) 1 MG tablet Take 2 tablets (2 mg) by mouth daily. 180 tablet 3     "furosemide (LASIX) 20 MG tablet TAKE 1 TABLET BY MOUTH TWICE  DAILY 180 tablet 0    gabapentin (NEURONTIN) 300 MG capsule Take 1-2 capsules (300-600 mg) by mouth 3 times daily. 360 capsule 4    glucose-vitamin C 4-6 GM-MG CHEW Take 4 g by mouth as needed.      insulin syringe-needle U-100 (30G X 1/2\" 0.3 ML) 30G X 1/2\" 0.3 ML miscellaneous Use 4 syringes daily or as directed. For insulin. 360 each 11    ipratropium - albuterol 0.5 mg/2.5 mg/3 mL (DUONEB) 0.5-2.5 (3) MG/3ML neb solution USE 1 VIAL VIA NEBULIZER INTO  THE LUNGS EVERY 6 HOURS AS  NEEDED FOR SHORTNESS OF BREATH , WHEEZING, OR COUGH 360 mL 11    loratadine-pseudoePHEDrine (CLARITIN-D 12-HOUR) 5-120 MG 12 hr tablet Take 1 tablet by mouth daily      Methotrexate Sodium 250 MG/10ML SOLN Inject 0.9 mLs subcutaneously once a week. 12 mL 0    metoprolol succinate ER (TOPROL XL) 50 MG 24 hr tablet Take 1 tablet (50 mg) by mouth daily. 90 tablet 4    montelukast (SINGULAIR) 10 MG tablet Take 1 tablet (10 mg) by mouth at bedtime. 90 tablet 3    olopatadine (PATANOL) 0.1 % ophthalmic solution Place 1 drop into both eyes 2 times daily.      pantoprazole (PROTONIX) 40 MG EC tablet Take 1 tablet (40 mg) by mouth daily. 90 tablet 3    pramipexole (MIRAPEX) 0.125 MG tablet TAKE TWO TABLETS BY MOUTH EVERY DAY IN THE AFTERNOON & TAKE TWO TABLETS BY MOUTH EVERY EVENING (Patient taking differently: Take 0.25 mg by mouth 3 times daily.) 360 tablet 3    sodium chloride (NEBUSAL) 3 % neb solution Inhale 4 mLs into the lungs as needed.      syringe/needle, sisp, (B-D SYRINGE/NEEDLE) 25G X 5/8\" 1 ML MISC 1 each once a week. To inject methotrexate 12 each 0    tirzepatide (MOUNJARO) 10 MG/0.5ML SOAJ auto-injector pen Inject 0.5 mLs (10 mg) subcutaneously once a week. 2 mL 2    Tuberculin-Allergy Syringes (BD SYRINGE SLIP TIP) 25G X 5/8\" 1 ML MISC USE 1 SYRINGE TO INJECT  METHOTREXATE ONCE WEEKLY 12 each 2            Physical Exam:   Blood pressure 123/75, pulse 66, temperature " "97.8  F (36.6  C), temperature source Tympanic, height 1.543 m (5' 0.75\"), SpO2 97%.  Wt Readings from Last 6 Encounters:   25 100.3 kg (221 lb 3.2 oz)   25 86.2 kg (190 lb)   24 95.3 kg (210 lb)   24 115.8 kg (255 lb 6.4 oz)   24 117.9 kg (260 lb)   24 117.9 kg (260 lb)     Constitutional: well-developed, appearing stated age; cooperative  Eyes: nl conjunctiva, sclera  ENT: nl external ears, nose, hearing, lips,   Neck: no mass or thyroid enlargement  Resp: No shortness of breath with normal conversation  Skin: no nail pitting, alopecia, rash, nodules or lesions.   MSK: Active synovitis over her 2nd through 5th MCPs bilaterally.  Tenderness particularly on the right MCPs.  Psych: nl judgement, orientation, memory, affect.           Data:   Imagin2022 MR Hand Right without and with IV contrast  IMPRESSION:  1. Although there is a background of mild scattered degenerative arthritis in the right hand and  wrist, there are erosions in the 2nd and 3rd metacarpal heads and diffuse enhancing synovitis including the MCP joints, out of proportion to degenerative changes. Findings suggest a superimposed inflammatory process.  2. Mild tenosynovial enhancement involving the flexor and extensor tendons of the hand and wrist, which may also be inflammatory.  3. Nonspecific enlargement and T2 hyperintensity of the median nerve with enhancement; this can be  seen in the setting of carpal tunnel syndrome.    2022   DX ANKLE BILATERAL 3+ VIEWS  IMPRESSION:  Calcaneal spurs. Slight degenerative arthritis both ankles. No erosions. Postoperative  changes bilateral 5th metatarsals.     DX Hand Bilateral 2 Views  IMPRESSION:  Mild scattered degenerative arthritis of both hands. No erosions.     2022 CT Chest without IV contrast  IMPRESSION:  Interval improvement in the previously new bilateral upper lung predominant groundglass opacities with a couple of areas mild ill-defined " residual groundglass, likely due to a resolving infectious/inflammatory process. Interval resolution of the previously new solid nodular consolidative opacities in the right lower lobe with improvement in right lower lobe endobronchial  plugging, also compatible with an infectious/inflammatory etiology. Tiny indeterminate 4 mm posterior right upper lobe ground glass nodule which is unchanged from 02/12/2022, but new since 03/19/2019    Cardiac:  07/01/2022 TTE   Final Impressions  1. Mildly enlarged left ventricular chamber size, no regional wall motion abnormalities, calculated 2-D four chamber monoplane volumetric ejection fraction 60%.  2. Normal right ventricular chamber size, normal systolic function, estimated right ventricular systolic pressure 48 mmHg (right atrial pressure of 10 mmHg).  3. Mildly thickened aortic valve with trivial AR  4. Tiny posterior pericardial effusion.    Noted RVSP of 48.    12/14/2018 Stress Test Echo  Positive for septal and apical myocardial ischemia    12/10/2018: CT Cardiac Angiogram triple rule out with IV contrast  IMPRESSION:   1. Mild pulmonary edema, and additional right perihilar patchy opacities. These  could represent infectious/inflammatory etiology or possibly asymmetric edema.  2. 1.3 cm semisolid nodule in the right upper lung. This is likely secondary to  #1 above, recommend follow-up in 3-6 months to confirm resolution.  3. Moderate stenosis of the proximal LAD. CAD-RADS category 3. Normal systolic  LV function with no regional wall motion abnormalities.  4. Negative for acute pulmonary embolism.    04/05/2022 ECG: QTc 441, p-mitrale     11/18/2022 bone density scan  IMPRESSION: NORMAL. Bone mineral density measurements are within normal limits using T score.     Laboratory:  10/27/2022  Creatinine 0.70, GFR greater than 90  Hemoglobin A1c 11.2  TSH 1.91  White blood cell count 12.0, hemoglobin 13.1, platelet count 334    6/15/2023  Creatinine 0.74, GFR  90  Hemoglobin A1c 9.9    8/23/2023  Creatinine 1.08, GFR 57  ALT 26, AST 26  CRP 8.07  White blood cell count 10.5, hemoglobin 13.6, platelet count 343  TB negative  Hepatitis B and hepatitis C nonreactive    10/12/2023  Creatinine 0.80    2/15/2024  Creatinine 0.85, GFR 76  Albumin 4.3  ALT 33, AST 34  CRP 3.77  White blood cell count 8.3, hemoglobin portion 46, plate count 326  Sed rate 15    8/13/2024  Creatinine 0.88, GFR 73  ALT 49, AST 46  CRP 7.34  WBC 6.6, Hgb 14.6, Plt 300  Sed rate 21    6/19/2025  Creatinine 0.86, GFR 75  Albumin 4.4  ALT 25, AST 27  CRP 4.11  WBC 9.4, Hgb 15.1, Plt 310  Sed Rate 24

## 2025-07-23 ENCOUNTER — OFFICE VISIT (OUTPATIENT)
Dept: RHEUMATOLOGY | Facility: CLINIC | Age: 65
End: 2025-07-23
Payer: COMMERCIAL

## 2025-07-23 ENCOUNTER — TELEPHONE (OUTPATIENT)
Dept: PHARMACY | Facility: OTHER | Age: 65
End: 2025-07-23

## 2025-07-23 VITALS
DIASTOLIC BLOOD PRESSURE: 75 MMHG | TEMPERATURE: 97.8 F | HEIGHT: 61 IN | BODY MASS INDEX: 42.14 KG/M2 | HEART RATE: 66 BPM | SYSTOLIC BLOOD PRESSURE: 123 MMHG | OXYGEN SATURATION: 97 %

## 2025-07-23 DIAGNOSIS — M06.00 SERONEGATIVE EROSIVE RHEUMATOID ARTHRITIS (H): Primary | ICD-10-CM

## 2025-07-23 DIAGNOSIS — Z79.899 HIGH RISK MEDICATION USE: ICD-10-CM

## 2025-07-23 PROCEDURE — 3078F DIAST BP <80 MM HG: CPT | Performed by: PHYSICIAN ASSISTANT

## 2025-07-23 PROCEDURE — 3074F SYST BP LT 130 MM HG: CPT | Performed by: PHYSICIAN ASSISTANT

## 2025-07-23 PROCEDURE — 99214 OFFICE O/P EST MOD 30 MIN: CPT | Performed by: PHYSICIAN ASSISTANT

## 2025-07-23 PROCEDURE — 1125F AMNT PAIN NOTED PAIN PRSNT: CPT | Performed by: PHYSICIAN ASSISTANT

## 2025-07-23 PROCEDURE — G2211 COMPLEX E/M VISIT ADD ON: HCPCS | Performed by: PHYSICIAN ASSISTANT

## 2025-07-23 RX ORDER — PREDNISONE 5 MG/1
TABLET ORAL
Qty: 32 TABLET | Refills: 0 | Status: SHIPPED | OUTPATIENT
Start: 2025-07-23

## 2025-07-23 ASSESSMENT — PAIN SCALES - GENERAL: PAINLEVEL_OUTOF10: SEVERE PAIN (9)

## 2025-07-23 NOTE — PATIENT INSTRUCTIONS
I Skylarfter Visit Instructions:     Thank you for coming to [facility] for your care. It is my goal to partner with you to help you reach your optimal state of health.       Plan:     Schedule follow-up with Jessica Dahl PA-C in 4 months. Okay to be a video visit  Labs:  CBC, Creatinine, Albumin, AST, ALT, CRP and Sed Rate every 2-3 months, will do in about 6 weeks with starting the Orencia  Medication recommendations:   Continue Methotrexate 22.5mg (0.9mL SubQ) weekly.   WEEKLY. While on Methotrexate:  -check labs (ALT/AST, albumin, CBC with platelets and creatinine) every 3 months  -Limit alcohol to 2 drinks weekly  -Take Folic Acid 2mg daily   -Tylenol 500-1000mg can be used as needed up to three times daily for nausea/headache associated with dosing  Continue Enbrel 50mg/ml SubQ weekly until you return from your trip, then we will start Orencia  Start Orencia 125mg/ml SubQ weekly  # Abatacept (Orencia) Risks and Benefits: The risks and benefits of abatacept were discussed in detail and the patient verbalized understanding.  The risks discussed include, but are not limited to, the risk for hypersensitivity, anaphylaxis, anaphylactoid reactions, an increased risk for serious infections leading to hospitalization or death, and an increased risk for more frequent respiratory adverse events in patients with COPD.  If subcutaneous injections are used, then injection site reactions and/or pain may occur at the site of injection.  The most common side effects were discussed that include headache, upper respiratory tract infections, nasopharyngitis, and nausea.  It was discussed that the medication would need to be discontinued if a serious infection develops.  It was discussed that live vaccinations should not be received while using abatacept or within 30 days prior to starting abatacept.  I encouraged reviewing the package insert and asking any questions about the medication.           Jessica Dahl PA-C  [facility]  [facility]    Contact information: [facility]  Clinic Number:  [phone]  Please call or send a Instant Information message with any questions about your care

## 2025-07-23 NOTE — TELEPHONE ENCOUNTER
Orencia ordered per verbal authorization from Jessica Dahl.    Luis Antonio Dee, PharmD  Medication Therapy Management Pharmacist  [facility]  Phone: [phone]

## 2025-07-24 NOTE — PROGRESS NOTES
"Medication Therapy Management (MTM) Encounter    ASSESSMENT:                            Medication Adherence/Access: No issues identified.    Diabetes   A1c not at goal <7%, but decreased from last check. Would benefit from continuing Mounjaro dose escalation as previously planned and restarting use of Renetta sensors to monitor blood sugars. Current Renetta sensors will be discontinued in September, should switch to Renetta 3 Plus to avoid not being able to access these.    PLAN:                            Finish up your 2 weeks of Mounjaro 7.5 mg weekly then increase to 10 mg weekly - there is [facility] a [facility] at [facility]  I sent in an updated prescription to [facility] for the Freestyle Renetta 3 Plus sensors. You will wear each sensor for 15 days instead of 14.    Follow-up: 9/2 at 9:30AM for a phone visit    SUBJECTIVE/OBJECTIVE:                          Rocio Price is a 65 year old female called for a follow-up visit.       Reason for visit: diabetes follow up.    Allergies/ADRs: Reviewed in chart  Past Medical History: Reviewed in chart  Tobacco: She reports that she has never smoked. She has never been exposed to tobacco smoke. She has never used smokeless tobacco.  Alcohol: very seldomly, maybe 1 drink in 3 months  Caffeine: 2 cups of coffee in the morning, max 2 cans of Coke  Medication Adherence/Access: no issues reported.    Diabetes   Mounjaro 7.5 mg weekly - has 2 weeks left  Farxiga 10 mg daily  Glucose tablets as needed - has not needed  Aspirin 81 mg daily for secondary prevention  Patient reports no medication side effects.  Current diabetes symptoms: None    Recent medication history  Stopped metformin ~6/2024 because it made her feel tired, had \"zero\" energy  Farxiga stopped 09/2023 in the past due to elevated creatinine, has been restarted  Meal time insulin (Novolog) stopped when Ozempic was started  Previously on insulin, patient prefers to avoid     Blood sugar monitoring: not currently, " still trying to get back into using Renetta sensors - currently has Renetta 3  Eye exam is up to date  Foot exam is up to date      Today's Vitals: LMP  (LMP Unknown)   ----------------      I spent 5 minutes with this patient today. All changes were made via collaborative practice agreement with Mechelle Erazo PA-C. A copy of the visit note was provided to the patient's provider(s).    A summary of these recommendations was sent via [employer].    Jada Clemens, DeanneD  Medication Therapy Management Pharmacist  [facility]  [phone]    Telemedicine Visit Details  The patient's medications can be safely assessed via a telemedicine encounter.  Type of service:  Telephone visit  Originating Location (pt. Location): Home    Distant Location (provider location):  On-site  Start Time: 9:39 AM  End Time: 9:44 AM     Medication Therapy Recommendations  Type 2 diabetes mellitus with hyperglycemia, without long-term current use of insulin (H)   1 Current Medication: Continuous Glucose Sensor (FREESTYLE RENETTA 3 SENSOR) MISC (Discontinued)   Current Medication Si each every 14 days.   Rationale: Medication product not available - Adherence - Adherence   Recommendation: Change Medication - FreeStyle Renetta 3 Plus Sensor Misc   Status: Accepted per CPA   Identified Date: 2025 Completed Date: 2025

## 2025-07-28 ENCOUNTER — VIRTUAL VISIT (OUTPATIENT)
Dept: PHARMACY | Facility: CLINIC | Age: 65
End: 2025-07-28
Payer: COMMERCIAL

## 2025-07-28 DIAGNOSIS — E11.65 TYPE 2 DIABETES MELLITUS WITH HYPERGLYCEMIA, WITHOUT LONG-TERM CURRENT USE OF INSULIN (H): Primary | ICD-10-CM

## 2025-07-28 PROCEDURE — 99207 PR NO CHARGE LOS: CPT | Mod: 93

## 2025-07-28 RX ORDER — HYDROCHLOROTHIAZIDE 12.5 MG/1
CAPSULE ORAL
Qty: 6 EACH | Refills: 1 | Status: SHIPPED | OUTPATIENT
Start: 2025-07-28

## 2025-07-28 NOTE — PATIENT INSTRUCTIONS
"Recommendations from today's MTM visit:                                                    MTM (medication therapy management) is a service provided by a clinical pharmacist designed to help you get the most of out of your medicines.   Today we reviewed what your medicines are for, how to know if they are working, that your medicines are safe and how to make your medicine regimen as easy as possible.      Finish up your 2 weeks of Mounjaro 7.5 mg weekly then increase to 10 mg weekly - there is [facility] a [facility] for [facility]  I sent in an updated prescription to [facility] for the Freestyle Renetta 3 Plus sensors. You will wear each sensor for 15 days instead of 14.    Follow-up: 9/2 at 9:30AM for a phone visit    It was great speaking with you today.  I value your experience and would be very thankful for your time in providing feedback in our clinic survey. In the next few days, you may receive an email or text message from [employer] with a link to a survey related to your  clinical pharmacist.\"     To schedule another MTM appointment, please call the clinic directly or you may call the MTM scheduling line at [phone] or toll-free at [phone].     My Clinical Pharmacist's contact information:                                                      Please feel free to contact me with any questions or concerns you have.      Jada Clemens, PharmD  Medication Therapy Management Pharmacist  [facility]  [phone]   "

## 2025-07-28 NOTE — Clinical Note
FYI - saw patient for an MTM visit.  Jada Clemens, PharmD Medication Therapy Management Pharmacist [facility]

## 2025-08-11 ENCOUNTER — VIRTUAL VISIT (OUTPATIENT)
Dept: PHARMACY | Facility: OTHER | Age: 65
End: 2025-08-11
Attending: PHYSICIAN ASSISTANT
Payer: COMMERCIAL

## 2025-08-11 DIAGNOSIS — M06.00 SERONEGATIVE EROSIVE RHEUMATOID ARTHRITIS (H): Primary | ICD-10-CM

## 2025-08-19 DIAGNOSIS — I50.9 CONGESTIVE HEART FAILURE, UNSPECIFIED HF CHRONICITY, UNSPECIFIED HEART FAILURE TYPE (H): ICD-10-CM

## 2025-08-19 RX ORDER — FUROSEMIDE 20 MG/1
20 TABLET ORAL 2 TIMES DAILY
Qty: 180 TABLET | Refills: 1 | Status: SHIPPED | OUTPATIENT
Start: 2025-08-19